# Patient Record
Sex: MALE | Race: WHITE | Employment: OTHER | URBAN - METROPOLITAN AREA
[De-identification: names, ages, dates, MRNs, and addresses within clinical notes are randomized per-mention and may not be internally consistent; named-entity substitution may affect disease eponyms.]

---

## 2017-02-23 ENCOUNTER — OFFICE VISIT (OUTPATIENT)
Dept: INTERNAL MEDICINE CLINIC | Age: 67
End: 2017-02-23

## 2017-02-23 VITALS
RESPIRATION RATE: 18 BRPM | BODY MASS INDEX: 40.32 KG/M2 | HEART RATE: 72 BPM | HEIGHT: 68 IN | SYSTOLIC BLOOD PRESSURE: 134 MMHG | DIASTOLIC BLOOD PRESSURE: 86 MMHG | WEIGHT: 266 LBS

## 2017-02-23 DIAGNOSIS — I10 ESSENTIAL HYPERTENSION: ICD-10-CM

## 2017-02-23 DIAGNOSIS — E78.5 HYPERLIPIDEMIA WITH TARGET LDL LESS THAN 100: ICD-10-CM

## 2017-02-23 DIAGNOSIS — E11.9 CONTROLLED TYPE 2 DIABETES MELLITUS WITHOUT COMPLICATION, WITHOUT LONG-TERM CURRENT USE OF INSULIN (HCC): Primary | ICD-10-CM

## 2017-02-23 LAB
A/G RATIO: 1.7 (ref 1.1–2.2)
ALBUMIN SERPL-MCNC: 4.4 G/DL (ref 3.4–5)
ALP BLD-CCNC: 50 U/L (ref 40–129)
ALT SERPL-CCNC: 35 U/L (ref 10–40)
ANION GAP SERPL CALCULATED.3IONS-SCNC: 15 MMOL/L (ref 3–16)
AST SERPL-CCNC: 27 U/L (ref 15–37)
BASOPHILS ABSOLUTE: 0.1 K/UL (ref 0–0.2)
BASOPHILS RELATIVE PERCENT: 0.8 %
BILIRUB SERPL-MCNC: 0.4 MG/DL (ref 0–1)
BUN BLDV-MCNC: 16 MG/DL (ref 7–20)
CALCIUM SERPL-MCNC: 9.4 MG/DL (ref 8.3–10.6)
CHLORIDE BLD-SCNC: 99 MMOL/L (ref 99–110)
CHOLESTEROL, TOTAL: 158 MG/DL (ref 0–199)
CO2: 26 MMOL/L (ref 21–32)
CREAT SERPL-MCNC: 1 MG/DL (ref 0.8–1.3)
CREATININE URINE: 95.8 MG/DL (ref 39–259)
EOSINOPHILS ABSOLUTE: 0.2 K/UL (ref 0–0.6)
EOSINOPHILS RELATIVE PERCENT: 3.2 %
GFR AFRICAN AMERICAN: >60
GFR NON-AFRICAN AMERICAN: >60
GLOBULIN: 2.6 G/DL
GLUCOSE BLD-MCNC: 145 MG/DL (ref 70–99)
HCT VFR BLD CALC: 40.7 % (ref 40.5–52.5)
HDLC SERPL-MCNC: 48 MG/DL (ref 40–60)
HEMOGLOBIN: 13 G/DL (ref 13.5–17.5)
LDL CHOLESTEROL CALCULATED: 73 MG/DL
LYMPHOCYTES ABSOLUTE: 2.4 K/UL (ref 1–5.1)
LYMPHOCYTES RELATIVE PERCENT: 33.3 %
MCH RBC QN AUTO: 30.6 PG (ref 26–34)
MCHC RBC AUTO-ENTMCNC: 31.9 G/DL (ref 31–36)
MCV RBC AUTO: 95.9 FL (ref 80–100)
MICROALBUMIN UR-MCNC: <1.2 MG/DL
MICROALBUMIN/CREAT UR-RTO: NORMAL MG/G (ref 0–30)
MONOCYTES ABSOLUTE: 0.7 K/UL (ref 0–1.3)
MONOCYTES RELATIVE PERCENT: 9.2 %
NEUTROPHILS ABSOLUTE: 3.8 K/UL (ref 1.7–7.7)
NEUTROPHILS RELATIVE PERCENT: 53.5 %
PDW BLD-RTO: 13.4 % (ref 12.4–15.4)
PLATELET # BLD: 150 K/UL (ref 135–450)
PMV BLD AUTO: 11 FL (ref 5–10.5)
POTASSIUM SERPL-SCNC: 5.3 MMOL/L (ref 3.5–5.1)
RBC # BLD: 4.24 M/UL (ref 4.2–5.9)
SODIUM BLD-SCNC: 140 MMOL/L (ref 136–145)
TOTAL PROTEIN: 7 G/DL (ref 6.4–8.2)
TRIGL SERPL-MCNC: 186 MG/DL (ref 0–150)
VLDLC SERPL CALC-MCNC: 37 MG/DL
WBC # BLD: 7.2 K/UL (ref 4–11)

## 2017-02-23 PROCEDURE — G8484 FLU IMMUNIZE NO ADMIN: HCPCS | Performed by: INTERNAL MEDICINE

## 2017-02-23 PROCEDURE — 3045F PR MOST RECENT HEMOGLOBIN A1C LEVEL 7.0-9.0%: CPT | Performed by: INTERNAL MEDICINE

## 2017-02-23 PROCEDURE — 4040F PNEUMOC VAC/ADMIN/RCVD: CPT | Performed by: INTERNAL MEDICINE

## 2017-02-23 PROCEDURE — 1036F TOBACCO NON-USER: CPT | Performed by: INTERNAL MEDICINE

## 2017-02-23 PROCEDURE — 99214 OFFICE O/P EST MOD 30 MIN: CPT | Performed by: INTERNAL MEDICINE

## 2017-02-23 PROCEDURE — G8427 DOCREV CUR MEDS BY ELIG CLIN: HCPCS | Performed by: INTERNAL MEDICINE

## 2017-02-23 PROCEDURE — G8417 CALC BMI ABV UP PARAM F/U: HCPCS | Performed by: INTERNAL MEDICINE

## 2017-02-23 PROCEDURE — 36415 COLL VENOUS BLD VENIPUNCTURE: CPT | Performed by: INTERNAL MEDICINE

## 2017-02-23 PROCEDURE — 1123F ACP DISCUSS/DSCN MKR DOCD: CPT | Performed by: INTERNAL MEDICINE

## 2017-02-23 PROCEDURE — 3017F COLORECTAL CA SCREEN DOC REV: CPT | Performed by: INTERNAL MEDICINE

## 2017-02-23 RX ORDER — PIOGLITAZONEHYDROCHLORIDE 45 MG/1
45 TABLET ORAL DAILY
Qty: 30 TABLET | Refills: 0 | Status: SHIPPED | OUTPATIENT
Start: 2017-02-23 | End: 2017-03-17 | Stop reason: ALTCHOICE

## 2017-02-23 RX ORDER — SIMVASTATIN 40 MG
40 TABLET ORAL NIGHTLY
Qty: 90 TABLET | Refills: 0 | Status: SHIPPED | OUTPATIENT
Start: 2017-02-23 | End: 2017-10-04 | Stop reason: SDUPTHER

## 2017-02-24 LAB
ESTIMATED AVERAGE GLUCOSE: 145.6 MG/DL
HBA1C MFR BLD: 6.7 %

## 2017-03-17 ENCOUNTER — HOSPITAL ENCOUNTER (OUTPATIENT)
Dept: OTHER | Age: 67
Discharge: HOME | End: 2017-03-17
Attending: INTERNAL MEDICINE | Admitting: INTERNAL MEDICINE

## 2017-03-17 VITALS
HEART RATE: 75 BPM | HEIGHT: 68 IN | OXYGEN SATURATION: 93 % | WEIGHT: 266 LBS | DIASTOLIC BLOOD PRESSURE: 62 MMHG | RESPIRATION RATE: 16 BRPM | SYSTOLIC BLOOD PRESSURE: 124 MMHG | BODY MASS INDEX: 40.32 KG/M2 | TEMPERATURE: 97 F

## 2017-03-17 LAB
GLUCOSE BLD-MCNC: 140 MG/DL (ref 70–99)
GLUCOSE BLD-MCNC: 144 MG/DL (ref 70–99)
PERFORMED ON: ABNORMAL
PERFORMED ON: ABNORMAL

## 2017-03-17 RX ORDER — SODIUM CHLORIDE, SODIUM LACTATE, POTASSIUM CHLORIDE, CALCIUM CHLORIDE 600; 310; 30; 20 MG/100ML; MG/100ML; MG/100ML; MG/100ML
INJECTION, SOLUTION INTRAVENOUS CONTINUOUS
Status: DISCONTINUED | OUTPATIENT
Start: 2017-03-17 | End: 2017-03-18 | Stop reason: HOSPADM

## 2017-03-17 ASSESSMENT — PAIN SCALES - GENERAL
PAINLEVEL_OUTOF10: 0

## 2017-03-17 ASSESSMENT — PAIN - FUNCTIONAL ASSESSMENT: PAIN_FUNCTIONAL_ASSESSMENT: 0-10

## 2017-04-27 ENCOUNTER — OFFICE VISIT (OUTPATIENT)
Dept: INTERNAL MEDICINE CLINIC | Age: 67
End: 2017-04-27

## 2017-04-27 VITALS
SYSTOLIC BLOOD PRESSURE: 120 MMHG | RESPIRATION RATE: 18 BRPM | WEIGHT: 263 LBS | HEART RATE: 82 BPM | DIASTOLIC BLOOD PRESSURE: 84 MMHG | BODY MASS INDEX: 39.86 KG/M2 | HEIGHT: 68 IN

## 2017-04-27 DIAGNOSIS — I83.10 LIPODERMATOSCLEROSIS: Primary | ICD-10-CM

## 2017-04-27 DIAGNOSIS — E11.9 CONTROLLED TYPE 2 DIABETES MELLITUS WITHOUT COMPLICATION, WITHOUT LONG-TERM CURRENT USE OF INSULIN (HCC): ICD-10-CM

## 2017-04-27 PROBLEM — M79.3 LIPODERMATOSCLEROSIS: Status: ACTIVE | Noted: 2017-04-27

## 2017-04-27 PROCEDURE — 1123F ACP DISCUSS/DSCN MKR DOCD: CPT | Performed by: INTERNAL MEDICINE

## 2017-04-27 PROCEDURE — 3017F COLORECTAL CA SCREEN DOC REV: CPT | Performed by: INTERNAL MEDICINE

## 2017-04-27 PROCEDURE — 4040F PNEUMOC VAC/ADMIN/RCVD: CPT | Performed by: INTERNAL MEDICINE

## 2017-04-27 PROCEDURE — 99213 OFFICE O/P EST LOW 20 MIN: CPT | Performed by: INTERNAL MEDICINE

## 2017-04-27 PROCEDURE — G8417 CALC BMI ABV UP PARAM F/U: HCPCS | Performed by: INTERNAL MEDICINE

## 2017-04-27 PROCEDURE — 1036F TOBACCO NON-USER: CPT | Performed by: INTERNAL MEDICINE

## 2017-04-27 PROCEDURE — 3044F HG A1C LEVEL LT 7.0%: CPT | Performed by: INTERNAL MEDICINE

## 2017-04-27 PROCEDURE — G8427 DOCREV CUR MEDS BY ELIG CLIN: HCPCS | Performed by: INTERNAL MEDICINE

## 2017-04-27 RX ORDER — FLUOCINONIDE 0.5 MG/G
OINTMENT TOPICAL DAILY
COMMUNITY

## 2017-08-11 ENCOUNTER — OFFICE VISIT (OUTPATIENT)
Dept: INTERNAL MEDICINE CLINIC | Age: 67
End: 2017-08-11

## 2017-08-11 VITALS
HEIGHT: 68 IN | DIASTOLIC BLOOD PRESSURE: 80 MMHG | HEART RATE: 82 BPM | RESPIRATION RATE: 18 BRPM | WEIGHT: 254 LBS | BODY MASS INDEX: 38.49 KG/M2 | SYSTOLIC BLOOD PRESSURE: 130 MMHG

## 2017-08-11 DIAGNOSIS — E11.9 CONTROLLED TYPE 2 DIABETES MELLITUS WITHOUT COMPLICATION, WITHOUT LONG-TERM CURRENT USE OF INSULIN (HCC): Primary | ICD-10-CM

## 2017-08-11 PROCEDURE — 3017F COLORECTAL CA SCREEN DOC REV: CPT | Performed by: INTERNAL MEDICINE

## 2017-08-11 PROCEDURE — 3046F HEMOGLOBIN A1C LEVEL >9.0%: CPT | Performed by: INTERNAL MEDICINE

## 2017-08-11 PROCEDURE — 1123F ACP DISCUSS/DSCN MKR DOCD: CPT | Performed by: INTERNAL MEDICINE

## 2017-08-11 PROCEDURE — G8417 CALC BMI ABV UP PARAM F/U: HCPCS | Performed by: INTERNAL MEDICINE

## 2017-08-11 PROCEDURE — 99213 OFFICE O/P EST LOW 20 MIN: CPT | Performed by: INTERNAL MEDICINE

## 2017-08-11 PROCEDURE — 4040F PNEUMOC VAC/ADMIN/RCVD: CPT | Performed by: INTERNAL MEDICINE

## 2017-08-11 PROCEDURE — G8427 DOCREV CUR MEDS BY ELIG CLIN: HCPCS | Performed by: INTERNAL MEDICINE

## 2017-08-11 PROCEDURE — 1036F TOBACCO NON-USER: CPT | Performed by: INTERNAL MEDICINE

## 2017-09-17 DIAGNOSIS — I10 ESSENTIAL HYPERTENSION: ICD-10-CM

## 2017-09-18 RX ORDER — LISINOPRIL 20 MG/1
TABLET ORAL
Qty: 90 TABLET | Refills: 0 | Status: SHIPPED | OUTPATIENT
Start: 2017-09-18 | End: 2017-10-04 | Stop reason: SDUPTHER

## 2017-09-18 RX ORDER — ATENOLOL 50 MG/1
TABLET ORAL
Qty: 90 TABLET | Refills: 0 | Status: SHIPPED | OUTPATIENT
Start: 2017-09-18 | End: 2017-10-04 | Stop reason: SDUPTHER

## 2017-10-04 ENCOUNTER — OFFICE VISIT (OUTPATIENT)
Dept: INTERNAL MEDICINE CLINIC | Age: 67
End: 2017-10-04

## 2017-10-04 VITALS
BODY MASS INDEX: 37.74 KG/M2 | WEIGHT: 249 LBS | DIASTOLIC BLOOD PRESSURE: 86 MMHG | HEART RATE: 80 BPM | HEIGHT: 68 IN | RESPIRATION RATE: 18 BRPM | SYSTOLIC BLOOD PRESSURE: 130 MMHG

## 2017-10-04 DIAGNOSIS — E11.9 CONTROLLED TYPE 2 DIABETES MELLITUS WITHOUT COMPLICATION, WITHOUT LONG-TERM CURRENT USE OF INSULIN (HCC): ICD-10-CM

## 2017-10-04 DIAGNOSIS — Z00.00 MEDICARE ANNUAL WELLNESS VISIT, SUBSEQUENT: Primary | ICD-10-CM

## 2017-10-04 DIAGNOSIS — Z23 NEED FOR STREPTOCOCCUS PNEUMONIAE VACCINATION: ICD-10-CM

## 2017-10-04 DIAGNOSIS — E29.1 HYPOGONADISM MALE: ICD-10-CM

## 2017-10-04 DIAGNOSIS — I10 ESSENTIAL HYPERTENSION: ICD-10-CM

## 2017-10-04 DIAGNOSIS — Z23 NEED FOR INFLUENZA VACCINATION: ICD-10-CM

## 2017-10-04 DIAGNOSIS — E78.5 HYPERLIPIDEMIA WITH TARGET LDL LESS THAN 100: ICD-10-CM

## 2017-10-04 LAB — HBA1C MFR BLD: 6.7 %

## 2017-10-04 PROCEDURE — G8427 DOCREV CUR MEDS BY ELIG CLIN: HCPCS | Performed by: INTERNAL MEDICINE

## 2017-10-04 PROCEDURE — 90662 IIV NO PRSV INCREASED AG IM: CPT | Performed by: INTERNAL MEDICINE

## 2017-10-04 PROCEDURE — 3017F COLORECTAL CA SCREEN DOC REV: CPT | Performed by: INTERNAL MEDICINE

## 2017-10-04 PROCEDURE — 4040F PNEUMOC VAC/ADMIN/RCVD: CPT | Performed by: INTERNAL MEDICINE

## 2017-10-04 PROCEDURE — 83036 HEMOGLOBIN GLYCOSYLATED A1C: CPT | Performed by: INTERNAL MEDICINE

## 2017-10-04 PROCEDURE — G8417 CALC BMI ABV UP PARAM F/U: HCPCS | Performed by: INTERNAL MEDICINE

## 2017-10-04 PROCEDURE — 1123F ACP DISCUSS/DSCN MKR DOCD: CPT | Performed by: INTERNAL MEDICINE

## 2017-10-04 PROCEDURE — G0008 ADMIN INFLUENZA VIRUS VAC: HCPCS | Performed by: INTERNAL MEDICINE

## 2017-10-04 PROCEDURE — G0439 PPPS, SUBSEQ VISIT: HCPCS | Performed by: INTERNAL MEDICINE

## 2017-10-04 PROCEDURE — G8484 FLU IMMUNIZE NO ADMIN: HCPCS | Performed by: INTERNAL MEDICINE

## 2017-10-04 PROCEDURE — G0009 ADMIN PNEUMOCOCCAL VACCINE: HCPCS | Performed by: INTERNAL MEDICINE

## 2017-10-04 PROCEDURE — 99214 OFFICE O/P EST MOD 30 MIN: CPT | Performed by: INTERNAL MEDICINE

## 2017-10-04 PROCEDURE — 90732 PPSV23 VACC 2 YRS+ SUBQ/IM: CPT | Performed by: INTERNAL MEDICINE

## 2017-10-04 PROCEDURE — 3046F HEMOGLOBIN A1C LEVEL >9.0%: CPT | Performed by: INTERNAL MEDICINE

## 2017-10-04 PROCEDURE — 1036F TOBACCO NON-USER: CPT | Performed by: INTERNAL MEDICINE

## 2017-10-04 RX ORDER — ATENOLOL 50 MG/1
TABLET ORAL
Qty: 90 TABLET | Refills: 3 | Status: SHIPPED | OUTPATIENT
Start: 2017-10-04 | End: 2018-09-17 | Stop reason: SDUPTHER

## 2017-10-04 RX ORDER — LISINOPRIL 20 MG/1
TABLET ORAL
Qty: 90 TABLET | Refills: 3 | Status: SHIPPED | OUTPATIENT
Start: 2017-10-04 | End: 2018-09-17 | Stop reason: SDUPTHER

## 2017-10-04 RX ORDER — SIMVASTATIN 40 MG
40 TABLET ORAL NIGHTLY
Qty: 90 TABLET | Refills: 1 | Status: SHIPPED | OUTPATIENT
Start: 2017-10-04 | End: 2018-09-17

## 2017-10-04 ASSESSMENT — PATIENT HEALTH QUESTIONNAIRE - PHQ9: SUM OF ALL RESPONSES TO PHQ QUESTIONS 1-9: 0

## 2017-10-04 ASSESSMENT — LIFESTYLE VARIABLES: HOW OFTEN DO YOU HAVE A DRINK CONTAINING ALCOHOL: 0

## 2017-10-04 ASSESSMENT — ANXIETY QUESTIONNAIRES: GAD7 TOTAL SCORE: 0

## 2017-10-04 NOTE — PROGRESS NOTES
lungs 2 times daily Yes Allison Espinal MD   ONE TOUCH ULTRA TEST strip TEST EVERY DAY Yes Rufino Flynn MD   aspirin 81 MG EC tablet Take 81 mg by mouth daily.  Yes Historical Provider, MD     Past Medical History:   Diagnosis Date    Allergic rhinitis     BPH     Colitis     ED (erectile dysfunction) 8/5/2013    Gout involving toe of right foot     due to eating too much chicken that had a lot of uric acid in [place]    Hyperlipidemia     Hypertension     Type 2 diabetes mellitus without complication ([address]     Type II or unspecified type diabetes mellitus without mention of complication, not stated as uncontrolled      Past Surgical History:   Procedure Laterality Date    BACK SURGERY      COLONOSCOPY  2007    normal per pt    SHOULDER SURGERY      TONGUE SURGERY      ectactic vessel and thrombus     Family History   Problem Relation Age of Onset    Cancer Mother      colon    Cancer Father      lung    Cancer Sister      pancreatic    Diabetes Sister     Cancer Sister      Kidney    Other Brother      diverticulitis with partial colectomy       CareTeam (Including outside providers/suppliers regularly involved in providing care):   Patient Care Team:  Mariana Espinal MD as PCP - General (Internal Medicine)  Traci Sosa MD as PCP - [facility] Attributed Provider    Wt Readings from Last 3 Encounters:   10/04/17 249 lb (112.9 kg)   08/11/17 254 lb (115.2 kg)   04/27/17 263 lb (119.3 kg)     Vitals:    10/04/17 1019   BP: 130/86   Pulse: 80   Resp: 18   Weight: 249 lb (112.9 kg)   Height: 5' 8\" (1.727 m)       General Appearance: alert and oriented to person, place and time, well developed and well- nourished, in no acute distress  Skin: warm and dry, no rash or erythema  Head: normocephalic and atraumatic  Eyes: pupils equal, round, and reactive to light, extraocular eye movements intact, conjunctivae normal  ENT: tympanic membrane, external ear and ear canal normal bilaterally, nose without deformity,

## 2017-10-04 NOTE — PATIENT INSTRUCTIONS
Personalized Preventive Plan for Jack Parekh - 10/4/2017  Medicare offers a range of preventive health benefits. Some of the tests and screenings are paid in full while other may be subject to a deductible, co-insurance, and/or copay. Some of these benefits include a comprehensive review of your medical history including lifestyle, illnesses that may run in your family, and various assessments and screenings as appropriate. After reviewing your medical record and screening and assessments performed today your provider may have ordered immunizations, labs, imaging, and/or referrals for you. A list of these orders (if applicable) as well as your Preventive Care list are included within your After Visit Summary for your review. Other Preventive Recommendations:    · A preventive eye exam performed by an eye specialist is recommended every 1-2 years to screen for glaucoma; cataracts, macular degeneration, and other eye disorders. · A preventive dental visit is recommended every 6 months. · Try to get at least 150 minutes of exercise per week or 10,000 steps per day on a pedometer . · Order or download the FREE \"Exercise & Physical Activity: Your Everyday Guide\" from [employer]. Call 5-802.573.9478 or search [employer] online. · You need 4340-6568 mg of calcium and 7359-4906 IU of vitamin D per day. It is possible to meet your calcium requirement with diet alone, but a vitamin D supplement is usually necessary to meet this goal.  · When exposed to the sun, use a sunscreen that protects against both UVA and UVB radiation with an SPF of 30 or greater. Reapply every 2 to 3 hours or after sweating, drying off with a towel, or swimming. · Always wear a seat belt when traveling in a car. Always wear a helmet when riding a bicycle or motorcycle.

## 2017-10-04 NOTE — MR AVS SNAPSHOT
· Always wear a seat belt when traveling in a car. Always wear a helmet when riding a bicycle or motorcycle. Today's Medication Changes          These changes are accurate as of: 10/4/17 10:55 AM.  If you have any questions, ask your nurse or doctor. CHANGE how you take these medications           atenolol 50 MG tablet   Commonly known as:  TENORMIN   Instructions:  TAKE 1 TABLET BY MOUTH DAILY   Quantity:  90 tablet   Refills:  3   What changed:  See the new instructions. Changed by:  Bimal Valladares MD       glucose blood VI test strips strip   Commonly known as:  ONE TOUCH ULTRA TEST   Instructions:  TEST EVERY DAY   Quantity:  200 strip   Refills:  3   What changed:  See the new instructions. Changed by:  Bimal Valladares MD       lisinopril 20 MG tablet   Commonly known as:  PRINIVIL;ZESTRIL   Instructions:  TAKE 1 TABLET BY MOUTH DAILY   Quantity:  90 tablet   Refills:  3   What changed:  See the new instructions. Changed by:  Bimal Valladares MD            Where to Get Your Medications      These medications were sent to [facility] [address], [address] [facility] [address]     Phone:  [phone]     atenolol 50 MG tablet    glucose blood VI test strips strip    lisinopril 20 MG tablet    metFORMIN 1000 MG tablet    simvastatin 40 MG tablet               Your Current Medications Are              lisinopril (PRINIVIL;ZESTRIL) 20 MG tablet TAKE 1 TABLET BY MOUTH DAILY    atenolol (TENORMIN) 50 MG tablet TAKE 1 TABLET BY MOUTH DAILY    metFORMIN (GLUCOPHAGE) 1000 MG tablet TAKE 1 TABLET BY MOUTH in AM and 1 1/2 PM    simvastatin (ZOCOR) 40 MG tablet Take 1 tablet by mouth nightly    glucose blood VI test strips (ONE TOUCH ULTRA TEST) strip TEST EVERY DAY    fluocinonide (LIDEX) 0.05 % ointment Apply topically daily Indications: 30 gm Apply topically 2 times daily. Compression Stockings MISC by Does not apply route    budesonide-formoterol (SYMBICORT) 160-4.5 MCG/ACT AERO Inhale 2 puffs into the lungs 2 times daily    aspirin 81 MG EC tablet Take 81 mg by mouth daily.       Allergies              Bactrim [Sulfamethoxazole-trimethoprim]     SOB and diaphorectic      We Ordered/Performed the following           Diabetic Foot Exam     INFLUENZA, HIGH DOSE, 65 YRS +, IM, PF, PREFILL SYR, 0.5ML (FLUZONE HD)     Pneumococcal polysaccharide vaccine 23-valent greater than or equal to 3yo subcutaneous/IM     Comments:    V03.82    POCT glycosylated hemoglobin (Hb A1C)          Additional Information        Basic Information     Date Of Birth Sex Race Ethnicity Preferred Language    1950 Male White Non-/Non  English      Problem List as of 10/4/2017  Date Reviewed: 10/4/2017                Lipodermatosclerosis    Hypogonadism male    ED (erectile dysfunction)    Essential hypertension    Hyperlipidemia with target LDL less than 100    Controlled type 2 diabetes mellitus without complication, without long-term current use of insulin ([facility] [address]      Immunizations as of 10/4/2017     Name Date    Influenza Virus Vaccine 10/6/2014, 9/19/2013, 9/22/2011, 11/2/2010    Influenza, High Dose 10/25/2016, 11/2/2015, 11/7/2012    Pneumococcal 13-valent Conjugate (Ucaeooh23) 8/17/2015    Pneumococcal Polysaccharide ([id number]) 8/23/2011    Tdap (Boostrix, Adacel) 2/7/2014    Tetanus 2/7/2014, 8/1/2005    Zoster 1/31/2013      Preventive Care        Date Due    Pneumococcal Vaccines (two) for all adults aged 72 and over (2 of 2 - PPSV23) 8/23/2016    Diabetic Foot Exam 2/22/2017    Yearly Flu Vaccine (1) 9/1/2017    Eye Exam By An Eye Doctor 2/17/2018    Hemoglobin A1C (Test For Long-Term Glucose Control) 2/23/2018    Urine Check For Kidney Problems 2/23/2018    Cholesterol Screening 2/23/2018    Colonoscopy 3/17/2022    Tetanus Combination Vaccine (2 - Td) 2/7/2024 [employer] Signup           Our records indicate that you have an active [employer] account. You can view your After Visit Summary by going to [email or web address] and logging in with your [employer] username and password. If you don't have a [employer] username and password but a parent or guardian has access to your record, the parent or guardian should login with their own [employer] username and password and access your record to view the After Visit Summary. Additional Information  If you have questions, please contact the physician practice where you receive care. Remember, [employer] is NOT to be used for urgent needs. For medical emergencies, dial 911. For questions regarding your [employer] account call [phone]. If you have a clinical question, please call your doctor's office.

## 2018-02-26 ENCOUNTER — OFFICE VISIT (OUTPATIENT)
Dept: INTERNAL MEDICINE CLINIC | Age: 68
End: 2018-02-26

## 2018-02-26 VITALS
HEART RATE: 74 BPM | HEIGHT: 68 IN | RESPIRATION RATE: 16 BRPM | WEIGHT: 235 LBS | SYSTOLIC BLOOD PRESSURE: 130 MMHG | DIASTOLIC BLOOD PRESSURE: 78 MMHG | BODY MASS INDEX: 35.61 KG/M2

## 2018-02-26 DIAGNOSIS — E78.5 HYPERLIPIDEMIA WITH TARGET LDL LESS THAN 100: ICD-10-CM

## 2018-02-26 DIAGNOSIS — E11.9 CONTROLLED TYPE 2 DIABETES MELLITUS WITHOUT COMPLICATION, WITHOUT LONG-TERM CURRENT USE OF INSULIN (HCC): Primary | ICD-10-CM

## 2018-02-26 DIAGNOSIS — I10 ESSENTIAL HYPERTENSION: ICD-10-CM

## 2018-02-26 LAB
BASOPHILS ABSOLUTE: 0 K/UL (ref 0–0.2)
BASOPHILS RELATIVE PERCENT: 0.6 %
EOSINOPHILS ABSOLUTE: 0.2 K/UL (ref 0–0.6)
EOSINOPHILS RELATIVE PERCENT: 3 %
HCT VFR BLD CALC: 40.4 % (ref 40.5–52.5)
HEMOGLOBIN: 13.5 G/DL (ref 13.5–17.5)
LYMPHOCYTES ABSOLUTE: 1.7 K/UL (ref 1–5.1)
LYMPHOCYTES RELATIVE PERCENT: 26.3 %
MCH RBC QN AUTO: 32.3 PG (ref 26–34)
MCHC RBC AUTO-ENTMCNC: 33.4 G/DL (ref 31–36)
MCV RBC AUTO: 96.6 FL (ref 80–100)
MONOCYTES ABSOLUTE: 0.4 K/UL (ref 0–1.3)
MONOCYTES RELATIVE PERCENT: 6.2 %
NEUTROPHILS ABSOLUTE: 4.1 K/UL (ref 1.7–7.7)
NEUTROPHILS RELATIVE PERCENT: 63.9 %
PDW BLD-RTO: 13 % (ref 12.4–15.4)
PLATELET # BLD: 154 K/UL (ref 135–450)
PMV BLD AUTO: 11.2 FL (ref 5–10.5)
RBC # BLD: 4.18 M/UL (ref 4.2–5.9)
WBC # BLD: 6.4 K/UL (ref 4–11)

## 2018-02-26 PROCEDURE — G8484 FLU IMMUNIZE NO ADMIN: HCPCS | Performed by: INTERNAL MEDICINE

## 2018-02-26 PROCEDURE — 99214 OFFICE O/P EST MOD 30 MIN: CPT | Performed by: INTERNAL MEDICINE

## 2018-02-26 PROCEDURE — 3046F HEMOGLOBIN A1C LEVEL >9.0%: CPT | Performed by: INTERNAL MEDICINE

## 2018-02-26 PROCEDURE — G8427 DOCREV CUR MEDS BY ELIG CLIN: HCPCS | Performed by: INTERNAL MEDICINE

## 2018-02-26 PROCEDURE — 1036F TOBACCO NON-USER: CPT | Performed by: INTERNAL MEDICINE

## 2018-02-26 PROCEDURE — 4040F PNEUMOC VAC/ADMIN/RCVD: CPT | Performed by: INTERNAL MEDICINE

## 2018-02-26 PROCEDURE — 36415 COLL VENOUS BLD VENIPUNCTURE: CPT | Performed by: INTERNAL MEDICINE

## 2018-02-26 PROCEDURE — 3017F COLORECTAL CA SCREEN DOC REV: CPT | Performed by: INTERNAL MEDICINE

## 2018-02-26 PROCEDURE — G8417 CALC BMI ABV UP PARAM F/U: HCPCS | Performed by: INTERNAL MEDICINE

## 2018-02-26 PROCEDURE — 1123F ACP DISCUSS/DSCN MKR DOCD: CPT | Performed by: INTERNAL MEDICINE

## 2018-02-27 LAB
A/G RATIO: 2 (ref 1.1–2.2)
ALBUMIN SERPL-MCNC: 4.5 G/DL (ref 3.4–5)
ALP BLD-CCNC: 50 U/L (ref 40–129)
ALT SERPL-CCNC: 18 U/L (ref 10–40)
ANION GAP SERPL CALCULATED.3IONS-SCNC: 15 MMOL/L (ref 3–16)
AST SERPL-CCNC: 22 U/L (ref 15–37)
BILIRUB SERPL-MCNC: 0.7 MG/DL (ref 0–1)
BUN BLDV-MCNC: 16 MG/DL (ref 7–20)
CALCIUM SERPL-MCNC: 8.8 MG/DL (ref 8.3–10.6)
CHLORIDE BLD-SCNC: 102 MMOL/L (ref 99–110)
CHOLESTEROL, TOTAL: 114 MG/DL (ref 0–199)
CO2: 25 MMOL/L (ref 21–32)
CREAT SERPL-MCNC: 1 MG/DL (ref 0.8–1.3)
CREATININE URINE: 144 MG/DL (ref 39–259)
ESTIMATED AVERAGE GLUCOSE: 131.2 MG/DL
GFR AFRICAN AMERICAN: >60
GFR NON-AFRICAN AMERICAN: >60
GLOBULIN: 2.2 G/DL
GLUCOSE BLD-MCNC: 118 MG/DL (ref 70–99)
HBA1C MFR BLD: 6.2 %
HDLC SERPL-MCNC: 46 MG/DL (ref 40–60)
LDL CHOLESTEROL CALCULATED: 38 MG/DL
MICROALBUMIN UR-MCNC: <1.2 MG/DL
MICROALBUMIN/CREAT UR-RTO: NORMAL MG/G (ref 0–30)
POTASSIUM SERPL-SCNC: 4.9 MMOL/L (ref 3.5–5.1)
SODIUM BLD-SCNC: 142 MMOL/L (ref 136–145)
TOTAL PROTEIN: 6.7 G/DL (ref 6.4–8.2)
TRIGL SERPL-MCNC: 151 MG/DL (ref 0–150)
VLDLC SERPL CALC-MCNC: 30 MG/DL

## 2018-05-29 ENCOUNTER — OFFICE VISIT (OUTPATIENT)
Dept: INTERNAL MEDICINE CLINIC | Age: 68
End: 2018-05-29

## 2018-05-29 VITALS
DIASTOLIC BLOOD PRESSURE: 82 MMHG | BODY MASS INDEX: 35.77 KG/M2 | HEART RATE: 82 BPM | SYSTOLIC BLOOD PRESSURE: 124 MMHG | RESPIRATION RATE: 16 BRPM | WEIGHT: 236 LBS | HEIGHT: 68 IN

## 2018-05-29 DIAGNOSIS — H35.342 MACULAR HOLE OF LEFT EYE: ICD-10-CM

## 2018-05-29 DIAGNOSIS — Z01.818 PREOP EXAM FOR INTERNAL MEDICINE: Primary | ICD-10-CM

## 2018-05-29 DIAGNOSIS — H25.012 CORTICAL AGE-RELATED CATARACT OF LEFT EYE: ICD-10-CM

## 2018-05-29 PROCEDURE — 3017F COLORECTAL CA SCREEN DOC REV: CPT | Performed by: INTERNAL MEDICINE

## 2018-05-29 PROCEDURE — 99214 OFFICE O/P EST MOD 30 MIN: CPT | Performed by: INTERNAL MEDICINE

## 2018-05-29 PROCEDURE — 1036F TOBACCO NON-USER: CPT | Performed by: INTERNAL MEDICINE

## 2018-05-29 PROCEDURE — 1123F ACP DISCUSS/DSCN MKR DOCD: CPT | Performed by: INTERNAL MEDICINE

## 2018-05-29 PROCEDURE — 4040F PNEUMOC VAC/ADMIN/RCVD: CPT | Performed by: INTERNAL MEDICINE

## 2018-05-29 PROCEDURE — G8427 DOCREV CUR MEDS BY ELIG CLIN: HCPCS | Performed by: INTERNAL MEDICINE

## 2018-05-29 PROCEDURE — G8417 CALC BMI ABV UP PARAM F/U: HCPCS | Performed by: INTERNAL MEDICINE

## 2018-05-29 RX ORDER — CIPROFLOXACIN AND DEXAMETHASONE 3; 1 MG/ML; MG/ML
4 SUSPENSION/ DROPS AURICULAR (OTIC) 2 TIMES DAILY
COMMUNITY
End: 2020-09-23

## 2018-07-12 LAB — DIABETIC RETINOPATHY: NORMAL

## 2018-09-17 ENCOUNTER — OFFICE VISIT (OUTPATIENT)
Dept: INTERNAL MEDICINE CLINIC | Age: 68
End: 2018-09-17

## 2018-09-17 VITALS
HEIGHT: 68 IN | OXYGEN SATURATION: 98 % | SYSTOLIC BLOOD PRESSURE: 132 MMHG | BODY MASS INDEX: 37.5 KG/M2 | WEIGHT: 247.4 LBS | HEART RATE: 75 BPM | DIASTOLIC BLOOD PRESSURE: 64 MMHG

## 2018-09-17 DIAGNOSIS — Z23 NEED FOR INFLUENZA VACCINATION: ICD-10-CM

## 2018-09-17 DIAGNOSIS — E11.9 CONTROLLED TYPE 2 DIABETES MELLITUS WITHOUT COMPLICATION, WITHOUT LONG-TERM CURRENT USE OF INSULIN (HCC): Primary | ICD-10-CM

## 2018-09-17 DIAGNOSIS — E78.5 HYPERLIPIDEMIA WITH TARGET LDL LESS THAN 100: ICD-10-CM

## 2018-09-17 DIAGNOSIS — I10 ESSENTIAL HYPERTENSION: ICD-10-CM

## 2018-09-17 LAB — HBA1C MFR BLD: 6.6 %

## 2018-09-17 PROCEDURE — 1101F PT FALLS ASSESS-DOCD LE1/YR: CPT | Performed by: INTERNAL MEDICINE

## 2018-09-17 PROCEDURE — G0008 ADMIN INFLUENZA VIRUS VAC: HCPCS | Performed by: INTERNAL MEDICINE

## 2018-09-17 PROCEDURE — G8417 CALC BMI ABV UP PARAM F/U: HCPCS | Performed by: INTERNAL MEDICINE

## 2018-09-17 PROCEDURE — 1123F ACP DISCUSS/DSCN MKR DOCD: CPT | Performed by: INTERNAL MEDICINE

## 2018-09-17 PROCEDURE — 3017F COLORECTAL CA SCREEN DOC REV: CPT | Performed by: INTERNAL MEDICINE

## 2018-09-17 PROCEDURE — 3044F HG A1C LEVEL LT 7.0%: CPT | Performed by: INTERNAL MEDICINE

## 2018-09-17 PROCEDURE — 83036 HEMOGLOBIN GLYCOSYLATED A1C: CPT | Performed by: INTERNAL MEDICINE

## 2018-09-17 PROCEDURE — 99214 OFFICE O/P EST MOD 30 MIN: CPT | Performed by: INTERNAL MEDICINE

## 2018-09-17 PROCEDURE — 90662 IIV NO PRSV INCREASED AG IM: CPT | Performed by: INTERNAL MEDICINE

## 2018-09-17 PROCEDURE — 2022F DILAT RTA XM EVC RTNOPTHY: CPT | Performed by: INTERNAL MEDICINE

## 2018-09-17 PROCEDURE — 4040F PNEUMOC VAC/ADMIN/RCVD: CPT | Performed by: INTERNAL MEDICINE

## 2018-09-17 PROCEDURE — 1036F TOBACCO NON-USER: CPT | Performed by: INTERNAL MEDICINE

## 2018-09-17 PROCEDURE — G8427 DOCREV CUR MEDS BY ELIG CLIN: HCPCS | Performed by: INTERNAL MEDICINE

## 2018-09-17 RX ORDER — LISINOPRIL 20 MG/1
TABLET ORAL
Qty: 90 TABLET | Refills: 1 | Status: SHIPPED | OUTPATIENT
Start: 2018-09-17 | End: 2019-02-26 | Stop reason: SDUPTHER

## 2018-09-17 RX ORDER — SIMVASTATIN 20 MG
20 TABLET ORAL EVERY EVENING
Qty: 90 TABLET | Refills: 3 | Status: SHIPPED | OUTPATIENT
Start: 2018-09-17 | End: 2019-08-06 | Stop reason: SDUPTHER

## 2018-09-17 RX ORDER — ATENOLOL 50 MG/1
TABLET ORAL
Qty: 90 TABLET | Refills: 1 | Status: SHIPPED | OUTPATIENT
Start: 2018-09-17 | End: 2019-02-26 | Stop reason: SDUPTHER

## 2018-09-17 RX ORDER — SIMVASTATIN 40 MG
40 TABLET ORAL NIGHTLY
Qty: 90 TABLET | Refills: 1 | Status: CANCELLED | OUTPATIENT
Start: 2018-09-17

## 2018-09-17 ASSESSMENT — PATIENT HEALTH QUESTIONNAIRE - PHQ9
2. FEELING DOWN, DEPRESSED OR HOPELESS: 0
SUM OF ALL RESPONSES TO PHQ QUESTIONS 1-9: 0
1. LITTLE INTEREST OR PLEASURE IN DOING THINGS: 0
SUM OF ALL RESPONSES TO PHQ QUESTIONS 1-9: 0
SUM OF ALL RESPONSES TO PHQ9 QUESTIONS 1 & 2: 0

## 2018-10-30 ENCOUNTER — OFFICE VISIT (OUTPATIENT)
Dept: INTERNAL MEDICINE CLINIC | Age: 68
End: 2018-10-30
Payer: MEDICARE

## 2018-10-30 VITALS
HEIGHT: 68 IN | TEMPERATURE: 98.1 F | DIASTOLIC BLOOD PRESSURE: 88 MMHG | OXYGEN SATURATION: 98 % | BODY MASS INDEX: 36.83 KG/M2 | WEIGHT: 243 LBS | SYSTOLIC BLOOD PRESSURE: 156 MMHG | HEART RATE: 64 BPM

## 2018-10-30 DIAGNOSIS — J06.9 URI, ACUTE: Primary | ICD-10-CM

## 2018-10-30 PROCEDURE — G8482 FLU IMMUNIZE ORDER/ADMIN: HCPCS | Performed by: INTERNAL MEDICINE

## 2018-10-30 PROCEDURE — 99213 OFFICE O/P EST LOW 20 MIN: CPT | Performed by: INTERNAL MEDICINE

## 2018-10-30 PROCEDURE — G8417 CALC BMI ABV UP PARAM F/U: HCPCS | Performed by: INTERNAL MEDICINE

## 2018-10-30 PROCEDURE — 1036F TOBACCO NON-USER: CPT | Performed by: INTERNAL MEDICINE

## 2018-10-30 PROCEDURE — 3017F COLORECTAL CA SCREEN DOC REV: CPT | Performed by: INTERNAL MEDICINE

## 2018-10-30 PROCEDURE — G8427 DOCREV CUR MEDS BY ELIG CLIN: HCPCS | Performed by: INTERNAL MEDICINE

## 2018-10-30 PROCEDURE — 4040F PNEUMOC VAC/ADMIN/RCVD: CPT | Performed by: INTERNAL MEDICINE

## 2018-10-30 PROCEDURE — 1101F PT FALLS ASSESS-DOCD LE1/YR: CPT | Performed by: INTERNAL MEDICINE

## 2018-10-30 PROCEDURE — 1123F ACP DISCUSS/DSCN MKR DOCD: CPT | Performed by: INTERNAL MEDICINE

## 2018-10-30 RX ORDER — AZITHROMYCIN 250 MG/1
TABLET, FILM COATED ORAL
Qty: 1 PACKET | Refills: 0 | Status: SHIPPED | OUTPATIENT
Start: 2018-10-30 | End: 2019-01-31

## 2018-10-30 NOTE — PROGRESS NOTES
Outpatient Prescriptions Marked as Taking for the 10/30/18 encounter (Office Visit) with Pavan Parry MD   Medication Sig Dispense Refill    atenolol (TENORMIN) 50 MG tablet TAKE 1 TABLET BY MOUTH DAILY 90 tablet 1    lisinopril (PRINIVIL;ZESTRIL) 20 MG tablet TAKE 1 TABLET BY MOUTH DAILY 90 tablet 1    metFORMIN (GLUCOPHAGE) 1000 MG tablet TAKE 1 TABLET BY MOUTH in AM and 1 1/2  tablet 1    simvastatin (ZOCOR) 20 MG tablet Take 1 tablet by mouth every evening 90 tablet 3    glucose blood VI test strips (ONE TOUCH ULTRA TEST) strip TEST EVERY  strip 3    Compression Stockings MISC by Does not apply route 1 each 1    budesonide-formoterol (SYMBICORT) 160-4.5 MCG/ACT AERO Inhale 2 puffs into the lungs 2 times daily 1 Inhaler 0    aspirin 81 MG EC tablet Take 81 mg by mouth daily. Review of Systems: 14 systems were negative except of what was stated on HPI    Nursing note and vitals reviewed. Vitals:    10/30/18 0918   BP: (!) 156/88   Pulse: 64   Temp: 98.1 °F (36.7 °C)   TempSrc: Oral   SpO2: 98%   Weight: 243 lb (110.2 kg)   Height: 5' 8\" (1.727 m)     Wt Readings from Last 3 Encounters:   10/30/18 243 lb (110.2 kg)   09/17/18 247 lb 6.4 oz (112.2 kg)   05/29/18 236 lb (107 kg)     BP Readings from Last 3 Encounters:   10/30/18 (!) 156/88   09/17/18 132/64   05/29/18 124/82     Body mass index is 36.95 kg/m². Constitutional: Patient appears well-developed and well-nourished. No distress. Head: Normocephalic and atraumatic. Neck: Normal range of motion. Neck supple. No thyroidmegaly. Cardiovascular: Normal rate, regular rhythm, normal heart sounds and intact distal pulses. Pulmonary/Chest: Effort normal and breath sounds normal. No stridor. No respiratory distress. No wheezes and no rales. Abdominal: Soft. Bowel sounds are normal. No distension and no mass. No tenderness. No rebound and no guarding. Musculoskeletal: No edema and no tenderness.    Skin: No rash or

## 2018-11-12 DIAGNOSIS — E11.9 CONTROLLED TYPE 2 DIABETES MELLITUS WITHOUT COMPLICATION, WITHOUT LONG-TERM CURRENT USE OF INSULIN (HCC): ICD-10-CM

## 2018-11-12 NOTE — TELEPHONE ENCOUNTER
glucose blood VI test strips (ONE TOUCH ULTRA TEST) strip     LAST REFILL 10/04/17  LAST AMOUNT 200         3 REFILLS   Last seen 10/30/18  Next office visit   02/26/19

## 2019-01-29 ENCOUNTER — OFFICE VISIT (OUTPATIENT)
Dept: INTERNAL MEDICINE CLINIC | Age: 69
End: 2019-01-29
Payer: MEDICARE

## 2019-01-29 ENCOUNTER — HOSPITAL ENCOUNTER (OUTPATIENT)
Age: 69
End: 2019-01-29
Payer: MEDICARE

## 2019-01-29 ENCOUNTER — HOSPITAL ENCOUNTER (OUTPATIENT)
Dept: GENERAL RADIOLOGY | Age: 69
Discharge: HOME | End: 2019-01-29
Payer: MEDICARE

## 2019-01-29 VITALS
OXYGEN SATURATION: 94 % | TEMPERATURE: 99.2 F | DIASTOLIC BLOOD PRESSURE: 86 MMHG | HEIGHT: 68 IN | SYSTOLIC BLOOD PRESSURE: 134 MMHG | WEIGHT: 250 LBS | HEART RATE: 92 BPM | BODY MASS INDEX: 37.89 KG/M2

## 2019-01-29 DIAGNOSIS — R05.9 COUGH: ICD-10-CM

## 2019-01-29 DIAGNOSIS — J20.9 BRONCHITIS, ACUTE, WITH BRONCHOSPASM: Primary | ICD-10-CM

## 2019-01-29 DIAGNOSIS — J20.9 BRONCHITIS, ACUTE, WITH BRONCHOSPASM: ICD-10-CM

## 2019-01-29 PROCEDURE — 3017F COLORECTAL CA SCREEN DOC REV: CPT | Performed by: INTERNAL MEDICINE

## 2019-01-29 PROCEDURE — G8417 CALC BMI ABV UP PARAM F/U: HCPCS | Performed by: INTERNAL MEDICINE

## 2019-01-29 PROCEDURE — G8482 FLU IMMUNIZE ORDER/ADMIN: HCPCS | Performed by: INTERNAL MEDICINE

## 2019-01-29 PROCEDURE — 1123F ACP DISCUSS/DSCN MKR DOCD: CPT | Performed by: INTERNAL MEDICINE

## 2019-01-29 PROCEDURE — 71046 X-RAY EXAM CHEST 2 VIEWS: CPT

## 2019-01-29 PROCEDURE — 1036F TOBACCO NON-USER: CPT | Performed by: INTERNAL MEDICINE

## 2019-01-29 PROCEDURE — 99213 OFFICE O/P EST LOW 20 MIN: CPT | Performed by: INTERNAL MEDICINE

## 2019-01-29 PROCEDURE — 4040F PNEUMOC VAC/ADMIN/RCVD: CPT | Performed by: INTERNAL MEDICINE

## 2019-01-29 PROCEDURE — G8427 DOCREV CUR MEDS BY ELIG CLIN: HCPCS | Performed by: INTERNAL MEDICINE

## 2019-01-29 PROCEDURE — 1101F PT FALLS ASSESS-DOCD LE1/YR: CPT | Performed by: INTERNAL MEDICINE

## 2019-01-29 RX ORDER — AZITHROMYCIN 250 MG/1
250 TABLET, FILM COATED ORAL DAILY
COMMUNITY
End: 2019-01-31

## 2019-01-29 RX ORDER — BENZONATATE 100 MG/1
100 CAPSULE ORAL 3 TIMES DAILY PRN
COMMUNITY
End: 2020-02-26

## 2019-01-29 RX ORDER — AMOXICILLIN AND CLAVULANATE POTASSIUM 875; 125 MG/1; MG/1
1 TABLET, FILM COATED ORAL 2 TIMES DAILY
Qty: 20 TABLET | Refills: 0 | Status: SHIPPED | OUTPATIENT
Start: 2019-01-29 | End: 2019-01-31 | Stop reason: SINTOL

## 2019-01-30 ENCOUNTER — TELEPHONE (OUTPATIENT)
Dept: INTERNAL MEDICINE CLINIC | Age: 69
End: 2019-01-30

## 2019-01-31 DIAGNOSIS — J20.9 BRONCHITIS, ACUTE, WITH BRONCHOSPASM: ICD-10-CM

## 2019-01-31 RX ORDER — LEVOFLOXACIN 500 MG/1
500 TABLET, FILM COATED ORAL DAILY
Qty: 7 TABLET | Refills: 0 | Status: SHIPPED | OUTPATIENT
Start: 2019-01-31 | End: 2019-02-07

## 2019-02-26 ENCOUNTER — OFFICE VISIT (OUTPATIENT)
Dept: INTERNAL MEDICINE CLINIC | Age: 69
End: 2019-02-26
Payer: MEDICARE

## 2019-02-26 VITALS
HEIGHT: 68 IN | SYSTOLIC BLOOD PRESSURE: 130 MMHG | DIASTOLIC BLOOD PRESSURE: 66 MMHG | WEIGHT: 250 LBS | HEART RATE: 71 BPM | RESPIRATION RATE: 16 BRPM | OXYGEN SATURATION: 97 % | BODY MASS INDEX: 37.89 KG/M2

## 2019-02-26 DIAGNOSIS — E11.9 CONTROLLED TYPE 2 DIABETES MELLITUS WITHOUT COMPLICATION, WITHOUT LONG-TERM CURRENT USE OF INSULIN (HCC): ICD-10-CM

## 2019-02-26 DIAGNOSIS — I10 ESSENTIAL HYPERTENSION: ICD-10-CM

## 2019-02-26 DIAGNOSIS — E78.5 HYPERLIPIDEMIA WITH TARGET LDL LESS THAN 100: ICD-10-CM

## 2019-02-26 DIAGNOSIS — Z00.00 ROUTINE GENERAL MEDICAL EXAMINATION AT A HEALTH CARE FACILITY: Primary | ICD-10-CM

## 2019-02-26 LAB
A/G RATIO: 2.1 (ref 1.1–2.2)
ALBUMIN SERPL-MCNC: 4.9 G/DL (ref 3.4–5)
ALP BLD-CCNC: 54 U/L (ref 40–129)
ALT SERPL-CCNC: 31 U/L (ref 10–40)
ANION GAP SERPL CALCULATED.3IONS-SCNC: 15 MMOL/L (ref 3–16)
AST SERPL-CCNC: 24 U/L (ref 15–37)
BASOPHILS ABSOLUTE: 0 K/UL (ref 0–0.2)
BASOPHILS RELATIVE PERCENT: 0.4 %
BILIRUB SERPL-MCNC: 0.6 MG/DL (ref 0–1)
BUN BLDV-MCNC: 19 MG/DL (ref 7–20)
CALCIUM SERPL-MCNC: 10 MG/DL (ref 8.3–10.6)
CHLORIDE BLD-SCNC: 95 MMOL/L (ref 99–110)
CHOLESTEROL, TOTAL: 134 MG/DL (ref 0–199)
CO2: 26 MMOL/L (ref 21–32)
CREAT SERPL-MCNC: 1.2 MG/DL (ref 0.8–1.3)
CREATININE URINE: 81.7 MG/DL (ref 39–259)
EOSINOPHILS ABSOLUTE: 0.1 K/UL (ref 0–0.6)
EOSINOPHILS RELATIVE PERCENT: 1.7 %
GFR AFRICAN AMERICAN: >60
GFR NON-AFRICAN AMERICAN: >60
GLOBULIN: 2.3 G/DL
GLUCOSE BLD-MCNC: 138 MG/DL (ref 70–99)
HCT VFR BLD CALC: 43.1 % (ref 40.5–52.5)
HDLC SERPL-MCNC: 48 MG/DL (ref 40–60)
HEMOGLOBIN: 14.1 G/DL (ref 13.5–17.5)
LDL CHOLESTEROL CALCULATED: 40 MG/DL
LYMPHOCYTES ABSOLUTE: 2.6 K/UL (ref 1–5.1)
LYMPHOCYTES RELATIVE PERCENT: 36 %
MCH RBC QN AUTO: 31.9 PG (ref 26–34)
MCHC RBC AUTO-ENTMCNC: 32.7 G/DL (ref 31–36)
MCV RBC AUTO: 97.5 FL (ref 80–100)
MICROALBUMIN UR-MCNC: <1.2 MG/DL
MICROALBUMIN/CREAT UR-RTO: NORMAL MG/G (ref 0–30)
MONOCYTES ABSOLUTE: 0.5 K/UL (ref 0–1.3)
MONOCYTES RELATIVE PERCENT: 7.2 %
NEUTROPHILS ABSOLUTE: 3.9 K/UL (ref 1.7–7.7)
NEUTROPHILS RELATIVE PERCENT: 54.7 %
PDW BLD-RTO: 13.3 % (ref 12.4–15.4)
PLATELET # BLD: 196 K/UL (ref 135–450)
PMV BLD AUTO: 10.3 FL (ref 5–10.5)
POTASSIUM SERPL-SCNC: 5.4 MMOL/L (ref 3.5–5.1)
RBC # BLD: 4.42 M/UL (ref 4.2–5.9)
SODIUM BLD-SCNC: 136 MMOL/L (ref 136–145)
TOTAL PROTEIN: 7.2 G/DL (ref 6.4–8.2)
TRIGL SERPL-MCNC: 232 MG/DL (ref 0–150)
VLDLC SERPL CALC-MCNC: 46 MG/DL
WBC # BLD: 7.1 K/UL (ref 4–11)

## 2019-02-26 PROCEDURE — 2022F DILAT RTA XM EVC RTNOPTHY: CPT | Performed by: INTERNAL MEDICINE

## 2019-02-26 PROCEDURE — 1036F TOBACCO NON-USER: CPT | Performed by: INTERNAL MEDICINE

## 2019-02-26 PROCEDURE — 1101F PT FALLS ASSESS-DOCD LE1/YR: CPT | Performed by: INTERNAL MEDICINE

## 2019-02-26 PROCEDURE — 36415 COLL VENOUS BLD VENIPUNCTURE: CPT | Performed by: INTERNAL MEDICINE

## 2019-02-26 PROCEDURE — G8427 DOCREV CUR MEDS BY ELIG CLIN: HCPCS | Performed by: INTERNAL MEDICINE

## 2019-02-26 PROCEDURE — 1123F ACP DISCUSS/DSCN MKR DOCD: CPT | Performed by: INTERNAL MEDICINE

## 2019-02-26 PROCEDURE — G0439 PPPS, SUBSEQ VISIT: HCPCS | Performed by: INTERNAL MEDICINE

## 2019-02-26 PROCEDURE — G8417 CALC BMI ABV UP PARAM F/U: HCPCS | Performed by: INTERNAL MEDICINE

## 2019-02-26 PROCEDURE — 3046F HEMOGLOBIN A1C LEVEL >9.0%: CPT | Performed by: INTERNAL MEDICINE

## 2019-02-26 PROCEDURE — 99214 OFFICE O/P EST MOD 30 MIN: CPT | Performed by: INTERNAL MEDICINE

## 2019-02-26 PROCEDURE — G8482 FLU IMMUNIZE ORDER/ADMIN: HCPCS | Performed by: INTERNAL MEDICINE

## 2019-02-26 PROCEDURE — 4040F PNEUMOC VAC/ADMIN/RCVD: CPT | Performed by: INTERNAL MEDICINE

## 2019-02-26 PROCEDURE — 3017F COLORECTAL CA SCREEN DOC REV: CPT | Performed by: INTERNAL MEDICINE

## 2019-02-26 RX ORDER — LISINOPRIL 20 MG/1
TABLET ORAL
Qty: 90 TABLET | Refills: 3 | Status: SHIPPED | OUTPATIENT
Start: 2019-02-26 | End: 2020-02-26 | Stop reason: SDUPTHER

## 2019-02-26 RX ORDER — ATENOLOL 50 MG/1
TABLET ORAL
Qty: 90 TABLET | Refills: 3 | Status: SHIPPED | OUTPATIENT
Start: 2019-02-26 | End: 2020-02-26 | Stop reason: SDUPTHER

## 2019-02-26 ASSESSMENT — PATIENT HEALTH QUESTIONNAIRE - PHQ9
SUM OF ALL RESPONSES TO PHQ QUESTIONS 1-9: 0
SUM OF ALL RESPONSES TO PHQ QUESTIONS 1-9: 0

## 2019-02-26 ASSESSMENT — ANXIETY QUESTIONNAIRES: GAD7 TOTAL SCORE: 0

## 2019-02-27 LAB
ESTIMATED AVERAGE GLUCOSE: 165.7 MG/DL
HBA1C MFR BLD: 7.4 %

## 2019-08-06 ENCOUNTER — OFFICE VISIT (OUTPATIENT)
Dept: INTERNAL MEDICINE CLINIC | Age: 69
End: 2019-08-06
Payer: MEDICARE

## 2019-08-06 VITALS
HEART RATE: 80 BPM | OXYGEN SATURATION: 98 % | WEIGHT: 234 LBS | BODY MASS INDEX: 35.46 KG/M2 | DIASTOLIC BLOOD PRESSURE: 76 MMHG | HEIGHT: 68 IN | SYSTOLIC BLOOD PRESSURE: 132 MMHG

## 2019-08-06 DIAGNOSIS — E78.2 MIXED HYPERLIPIDEMIA: ICD-10-CM

## 2019-08-06 DIAGNOSIS — E11.9 CONTROLLED TYPE 2 DIABETES MELLITUS WITHOUT COMPLICATION, WITHOUT LONG-TERM CURRENT USE OF INSULIN (HCC): Primary | ICD-10-CM

## 2019-08-06 DIAGNOSIS — I10 ESSENTIAL HYPERTENSION: ICD-10-CM

## 2019-08-06 LAB — HBA1C MFR BLD: 6.6 %

## 2019-08-06 PROCEDURE — 99214 OFFICE O/P EST MOD 30 MIN: CPT | Performed by: INTERNAL MEDICINE

## 2019-08-06 PROCEDURE — 1036F TOBACCO NON-USER: CPT | Performed by: INTERNAL MEDICINE

## 2019-08-06 PROCEDURE — 2022F DILAT RTA XM EVC RTNOPTHY: CPT | Performed by: INTERNAL MEDICINE

## 2019-08-06 PROCEDURE — 4040F PNEUMOC VAC/ADMIN/RCVD: CPT | Performed by: INTERNAL MEDICINE

## 2019-08-06 PROCEDURE — 3017F COLORECTAL CA SCREEN DOC REV: CPT | Performed by: INTERNAL MEDICINE

## 2019-08-06 PROCEDURE — G8427 DOCREV CUR MEDS BY ELIG CLIN: HCPCS | Performed by: INTERNAL MEDICINE

## 2019-08-06 PROCEDURE — 3044F HG A1C LEVEL LT 7.0%: CPT | Performed by: INTERNAL MEDICINE

## 2019-08-06 PROCEDURE — 1123F ACP DISCUSS/DSCN MKR DOCD: CPT | Performed by: INTERNAL MEDICINE

## 2019-08-06 PROCEDURE — 83036 HEMOGLOBIN GLYCOSYLATED A1C: CPT | Performed by: INTERNAL MEDICINE

## 2019-08-06 PROCEDURE — G8417 CALC BMI ABV UP PARAM F/U: HCPCS | Performed by: INTERNAL MEDICINE

## 2019-08-06 RX ORDER — SIMVASTATIN 20 MG
20 TABLET ORAL EVERY EVENING
Qty: 90 TABLET | Refills: 3 | Status: SHIPPED | OUTPATIENT
Start: 2019-08-06 | End: 2020-08-19

## 2019-08-06 NOTE — PROGRESS NOTES
(ZOCOR) 20 MG tablet; Take 1 tablet by mouth every evening    Essential hypertension        Return Fasting Medicare Wellness 2/26 or after.

## 2020-01-15 ENCOUNTER — OFFICE VISIT (OUTPATIENT)
Dept: INTERNAL MEDICINE CLINIC | Age: 70
End: 2020-01-15
Payer: MEDICARE

## 2020-01-15 VITALS
HEIGHT: 68 IN | WEIGHT: 242 LBS | OXYGEN SATURATION: 99 % | DIASTOLIC BLOOD PRESSURE: 62 MMHG | BODY MASS INDEX: 36.68 KG/M2 | SYSTOLIC BLOOD PRESSURE: 130 MMHG | HEART RATE: 80 BPM

## 2020-01-15 PROCEDURE — G8427 DOCREV CUR MEDS BY ELIG CLIN: HCPCS | Performed by: INTERNAL MEDICINE

## 2020-01-15 PROCEDURE — 1036F TOBACCO NON-USER: CPT | Performed by: INTERNAL MEDICINE

## 2020-01-15 PROCEDURE — 4040F PNEUMOC VAC/ADMIN/RCVD: CPT | Performed by: INTERNAL MEDICINE

## 2020-01-15 PROCEDURE — 1123F ACP DISCUSS/DSCN MKR DOCD: CPT | Performed by: INTERNAL MEDICINE

## 2020-01-15 PROCEDURE — 99213 OFFICE O/P EST LOW 20 MIN: CPT | Performed by: INTERNAL MEDICINE

## 2020-01-15 PROCEDURE — G8417 CALC BMI ABV UP PARAM F/U: HCPCS | Performed by: INTERNAL MEDICINE

## 2020-01-15 PROCEDURE — 3017F COLORECTAL CA SCREEN DOC REV: CPT | Performed by: INTERNAL MEDICINE

## 2020-01-15 PROCEDURE — G8482 FLU IMMUNIZE ORDER/ADMIN: HCPCS | Performed by: INTERNAL MEDICINE

## 2020-01-15 ASSESSMENT — PATIENT HEALTH QUESTIONNAIRE - PHQ9
SUM OF ALL RESPONSES TO PHQ QUESTIONS 1-9: 0
SUM OF ALL RESPONSES TO PHQ9 QUESTIONS 1 & 2: 0
1. LITTLE INTEREST OR PLEASURE IN DOING THINGS: 0
2. FEELING DOWN, DEPRESSED OR HOPELESS: 0
SUM OF ALL RESPONSES TO PHQ QUESTIONS 1-9: 0

## 2020-01-15 NOTE — PROGRESS NOTES
Carolee Parekh  YOB: 1950    Date of Service:  1/15/2020    Chief Complaint:      Chief Complaint   Patient presents with    Arm Pain     right     Back Pain    Neck Pain       HPI:  Maria Luisa Stockton is a 71 y.o. He complain of right neck pain about 1 month ago which has resolve with just some noise currently. He still has persistent right upper back pain pain which radiates to right upper arm intermittently. Currently no right arm pain. He was told he had bone spurs in his neck and had right shoulder surgery in 1990's. He denies any injuries.   He's been taking Advil 3 qhs and starting to get better    Lab Results   Component Value Date    LABA1C 6.6 08/06/2019    LABA1C 7.4 02/26/2019    LABA1C 6.6 09/17/2018    LABMICR <1.20 02/26/2019     Lab Results   Component Value Date     02/26/2019    K 5.4 (H) 02/26/2019    CL 95 (L) 02/26/2019    CO2 26 02/26/2019    BUN 19 02/26/2019    CREATININE 1.2 02/26/2019    GLUCOSE 138 (H) 02/26/2019    CALCIUM 10.0 02/26/2019     Lab Results   Component Value Date    CHOL 134 02/26/2019    TRIG 232 02/26/2019    HDL 48 02/26/2019    HDL 44 05/08/2012    LDLCALC 40 02/26/2019     Lab Results   Component Value Date    ALT 31 02/26/2019    AST 24 02/26/2019     Lab Results   Component Value Date    TSH 2.01 08/23/2011    TSH 1.78 03/24/2010     Lab Results   Component Value Date    WBC 7.1 02/26/2019    HGB 14.1 02/26/2019    HCT 43.1 02/26/2019    MCV 97.5 02/26/2019     02/26/2019     No results found for: INR   Lab Results   Component Value Date    PSA 0.45 02/22/2016    PSA 0.47 02/02/2015    PSA 0.52 02/26/2014      Lab Results   Component Value Date    LABURIC 8.4 (H) 08/24/2016        Patient Active Problem List   Diagnosis    Essential hypertension    Controlled type 2 diabetes mellitus without complication, without long-term current use of insulin ([address]    ED (erectile dysfunction)    Hypogonadism male    Lipodermatosclerosis Pulmonary/Chest: Effort normal and breath sounds normal. No stridor. No respiratory distress. No wheezes and no rales. Abdominal: Soft. Bowel sounds are normal. No distension and no mass. No tenderness. No rebound and no guarding. Musculoskeletal: No edema and no tenderness. Skin: No rash or erythema. Psychiatric: Normal mood and affect. Behavior is normal.   MIld right upper back/scapular discomfort on palpation. He has FROM, no asymmetry of his scapular with arm movements. Neck with FROM without much pain. No arm pain currently. Assessment/Plan:  Lizette Mendoza was seen today for arm pain, back pain and neck pain. Diagnoses and all orders for this visit:    Muscle strain of right scapular region, initial encounter    Cervicalgia    Increase Advil 2-4 tid prn since it's improving. If not better, pt to f/u with orthopedic    Return if symptoms worsen or fail to improve.

## 2020-02-26 ENCOUNTER — OFFICE VISIT (OUTPATIENT)
Dept: INTERNAL MEDICINE CLINIC | Age: 70
End: 2020-02-26
Payer: MEDICARE

## 2020-02-26 VITALS
HEIGHT: 68 IN | HEART RATE: 76 BPM | OXYGEN SATURATION: 94 % | DIASTOLIC BLOOD PRESSURE: 84 MMHG | WEIGHT: 236 LBS | SYSTOLIC BLOOD PRESSURE: 128 MMHG | BODY MASS INDEX: 35.77 KG/M2

## 2020-02-26 LAB
A/G RATIO: 2.1 (ref 1.1–2.2)
ALBUMIN SERPL-MCNC: 4.8 G/DL (ref 3.4–5)
ALP BLD-CCNC: 51 U/L (ref 40–129)
ALT SERPL-CCNC: 23 U/L (ref 10–40)
ANION GAP SERPL CALCULATED.3IONS-SCNC: 16 MMOL/L (ref 3–16)
AST SERPL-CCNC: 24 U/L (ref 15–37)
BASOPHILS ABSOLUTE: 0 K/UL (ref 0–0.2)
BASOPHILS RELATIVE PERCENT: 0.6 %
BILIRUB SERPL-MCNC: 0.5 MG/DL (ref 0–1)
BUN BLDV-MCNC: 23 MG/DL (ref 7–20)
CALCIUM SERPL-MCNC: 9.8 MG/DL (ref 8.3–10.6)
CHLORIDE BLD-SCNC: 100 MMOL/L (ref 99–110)
CHOLESTEROL, TOTAL: 97 MG/DL (ref 0–199)
CO2: 26 MMOL/L (ref 21–32)
CREAT SERPL-MCNC: 1.2 MG/DL (ref 0.8–1.3)
CREATININE URINE: 93.9 MG/DL (ref 39–259)
EOSINOPHILS ABSOLUTE: 0.1 K/UL (ref 0–0.6)
EOSINOPHILS RELATIVE PERCENT: 2.9 %
GFR AFRICAN AMERICAN: >60
GFR NON-AFRICAN AMERICAN: 60
GLOBULIN: 2.3 G/DL
GLUCOSE BLD-MCNC: 124 MG/DL (ref 70–99)
HCT VFR BLD CALC: 40 % (ref 40.5–52.5)
HDLC SERPL-MCNC: 42 MG/DL (ref 40–60)
HEMOGLOBIN: 13.5 G/DL (ref 13.5–17.5)
LDL CHOLESTEROL CALCULATED: 34 MG/DL
LYMPHOCYTES ABSOLUTE: 1.6 K/UL (ref 1–5.1)
LYMPHOCYTES RELATIVE PERCENT: 31.8 %
MCH RBC QN AUTO: 32.2 PG (ref 26–34)
MCHC RBC AUTO-ENTMCNC: 33.8 G/DL (ref 31–36)
MCV RBC AUTO: 95.4 FL (ref 80–100)
MICROALBUMIN UR-MCNC: <1.2 MG/DL
MICROALBUMIN/CREAT UR-RTO: NORMAL MG/G (ref 0–30)
MONOCYTES ABSOLUTE: 0.6 K/UL (ref 0–1.3)
MONOCYTES RELATIVE PERCENT: 11.7 %
NEUTROPHILS ABSOLUTE: 2.6 K/UL (ref 1.7–7.7)
NEUTROPHILS RELATIVE PERCENT: 53 %
PDW BLD-RTO: 12.7 % (ref 12.4–15.4)
PLATELET # BLD: 176 K/UL (ref 135–450)
PMV BLD AUTO: 10.5 FL (ref 5–10.5)
POTASSIUM SERPL-SCNC: 5 MMOL/L (ref 3.5–5.1)
RBC # BLD: 4.19 M/UL (ref 4.2–5.9)
SODIUM BLD-SCNC: 142 MMOL/L (ref 136–145)
TOTAL PROTEIN: 7.1 G/DL (ref 6.4–8.2)
TRIGL SERPL-MCNC: 107 MG/DL (ref 0–150)
VLDLC SERPL CALC-MCNC: 21 MG/DL
WBC # BLD: 5 K/UL (ref 4–11)

## 2020-02-26 PROCEDURE — G8482 FLU IMMUNIZE ORDER/ADMIN: HCPCS | Performed by: INTERNAL MEDICINE

## 2020-02-26 PROCEDURE — 36415 COLL VENOUS BLD VENIPUNCTURE: CPT | Performed by: INTERNAL MEDICINE

## 2020-02-26 PROCEDURE — 99214 OFFICE O/P EST MOD 30 MIN: CPT | Performed by: INTERNAL MEDICINE

## 2020-02-26 PROCEDURE — 2022F DILAT RTA XM EVC RTNOPTHY: CPT | Performed by: INTERNAL MEDICINE

## 2020-02-26 PROCEDURE — 4040F PNEUMOC VAC/ADMIN/RCVD: CPT | Performed by: INTERNAL MEDICINE

## 2020-02-26 PROCEDURE — 90746 HEPB VACCINE 3 DOSE ADULT IM: CPT | Performed by: INTERNAL MEDICINE

## 2020-02-26 PROCEDURE — G0010 ADMIN HEPATITIS B VACCINE: HCPCS | Performed by: INTERNAL MEDICINE

## 2020-02-26 PROCEDURE — 3046F HEMOGLOBIN A1C LEVEL >9.0%: CPT | Performed by: INTERNAL MEDICINE

## 2020-02-26 PROCEDURE — 3017F COLORECTAL CA SCREEN DOC REV: CPT | Performed by: INTERNAL MEDICINE

## 2020-02-26 PROCEDURE — G8417 CALC BMI ABV UP PARAM F/U: HCPCS | Performed by: INTERNAL MEDICINE

## 2020-02-26 PROCEDURE — 1036F TOBACCO NON-USER: CPT | Performed by: INTERNAL MEDICINE

## 2020-02-26 PROCEDURE — 1123F ACP DISCUSS/DSCN MKR DOCD: CPT | Performed by: INTERNAL MEDICINE

## 2020-02-26 PROCEDURE — G8427 DOCREV CUR MEDS BY ELIG CLIN: HCPCS | Performed by: INTERNAL MEDICINE

## 2020-02-26 PROCEDURE — G0439 PPPS, SUBSEQ VISIT: HCPCS | Performed by: INTERNAL MEDICINE

## 2020-02-26 RX ORDER — ATENOLOL 50 MG/1
TABLET ORAL
Qty: 90 TABLET | Refills: 3 | Status: SHIPPED | OUTPATIENT
Start: 2020-02-26 | End: 2021-02-17 | Stop reason: SDUPTHER

## 2020-02-26 RX ORDER — LISINOPRIL 20 MG/1
TABLET ORAL
Qty: 90 TABLET | Refills: 3 | Status: SHIPPED | OUTPATIENT
Start: 2020-02-26 | End: 2021-02-17 | Stop reason: SDUPTHER

## 2020-02-26 ASSESSMENT — LIFESTYLE VARIABLES: HOW OFTEN DO YOU HAVE A DRINK CONTAINING ALCOHOL: 0

## 2020-02-26 ASSESSMENT — PATIENT HEALTH QUESTIONNAIRE - PHQ9
SUM OF ALL RESPONSES TO PHQ QUESTIONS 1-9: 0
SUM OF ALL RESPONSES TO PHQ QUESTIONS 1-9: 0

## 2020-02-26 NOTE — PROGRESS NOTES
Carolee Parekh  YOB: 1950    Date of Service:  2/26/2020    Chief Complaint:      Chief Complaint   Patient presents with    Medicare AWV    Diabetes    Hypertension    Hyperlipidemia       HPI:  Maria Luisa Stockton is a 71 y.o. Treatment Adherence:   Medication compliance: compliant all of the time   Diet compliance: compliant most of the time   Weight trend: stable   Current exercise: remodeling house daily, walk 2-3x/week   Barriers: none   Diabetes Mellitus Type 2: Current symptoms/problems include none. Metformin 1 g 1 AM and 1 1/2 PM.   Home blood sugar records: fasting range: 120's AM and PM  Any episodes of hypoglycemia? no   Eye exam current (within one year): yes   Tobacco history: He reports that he has never smoked. He has never used smokeless tobacco.   Daily Aspirin? Yes   Known diabetic complications: none   Hypertension: Home blood pressure monitoring: Yes - 110/61-84 on Atenolol 50 mg qd and Lisinopril 20 mg qd. He is adherent to a low sodium diet. Patient denies chest pain, shortness of breath, headache, lightheadedness, blurred vision, peripheral edema, palpitations, dry cough and fatigue. Antihypertensive medication side effects: no medication side effects noted. Use of agents associated with hypertension: none. Hyperlipidemia: No new myalgias or GI upset on simvastatin (Zocor) 20 mg qhs. Trying to be on a low fat diet. Hypogonadism: he got off the Androgel due to nipple tenderness and he has not felt more tired.    ED: unchanged. able to have an erection a little easier but not much of a difference when he was on testosterone replacement.     Lab Results   Component Value Date    LABA1C 6.6 08/06/2019    LABA1C 7.4 02/26/2019    LABA1C 6.6 09/17/2018    LABMICR <1.20 02/26/2019     Lab Results   Component Value Date     02/26/2019    K 5.4 (H) 02/26/2019    CL 95 (L) 02/26/2019    CO2 26 02/26/2019    BUN 19 02/26/2019    CREATININE 1.2 02/26/2019    GLUCOSE 138 (H)

## 2020-02-26 NOTE — PROGRESS NOTES
Medicare Annual Wellness Visit  Name: Gopi Aguilar Date: 2020   MRN: <> Sex: Male   Age: 71 y.o. Ethnicity: Non-/Non    : 1950 Race: Cy Parekh is here for Medicare AWV; Diabetes; Hypertension; and Hyperlipidemia    Screenings for behavioral, psychosocial and functional/safety risks, and cognitive dysfunction are all negative except as indicated below. These results, as well as other patient data from the [address] form, are documented in Flowsheets linked to this Encounter. Allergies   Allergen Reactions    Augmentin [Amoxicillin-Pot Clavulanate]      Stomach pain with n/v    Bactrim [Sulfamethoxazole-Trimethoprim]      SOB and diaphorectic       Prior to Visit Medications    Medication Sig Taking? Authorizing Provider   simvastatin (ZOCOR) 20 MG tablet Take 1 tablet by mouth every evening Yes Allison Espinal MD   atenolol (TENORMIN) 50 MG tablet TAKE 1 TABLET BY MOUTH DAILY Yes Allison Espinal MD   lisinopril (PRINIVIL;ZESTRIL) 20 MG tablet TAKE 1 TABLET BY MOUTH DAILY Yes Allison Espinal MD   metFORMIN (GLUCOPHAGE) 1000 MG tablet TAKE 1 TABLET BY MOUTH in AM and 1 1/2 PM Yes Allison Espinal MD   benzonatate (TESSALON) 100 MG capsule Take 100 mg by mouth 3 times daily as needed for Cough Yes Historical Provider, MD   ONE TOUCH ULTRA TEST strip TEST EVERY DAY Yes Allison Espinal MD   ciprofloxacin-dexamethasone (CIPRODEX) 0.3-0.1 % otic suspension 4 drops 2 times daily Yes Historical Provider, MD   fluocinonide (LIDEX) 0.05 % ointment Apply topically daily Indications: 30 gm Apply topically 2 times daily. Yes Historical Provider, MD   Compression Stockings MISC by Does not apply route Yes Allison Espinal MD   aspirin 81 MG EC tablet Take 81 mg by mouth daily.  Yes Historical Provider, MD       Past Medical History:   Diagnosis Date    Allergic rhinitis     BPH     Colitis     ED (erectile dysfunction) 2013    ED (erectile dysfunction)     No  Do you eat fewer than 2 meals per day?:NO  Have you seen a dentist within the past year?: Yes  Body mass index is 35.88 kg/m². Health Habits/Nutrition Interventions:  · none indicated    Personalized Preventive Plan   Current Health Maintenance Status  Immunization History   Administered Date(s) Administered    Influenza 11/02/2010, 09/22/2011, 09/19/2013    Influenza Virus Vaccine 10/06/2014    Influenza, High Dose (Fluzone 65 yrs and older) 11/07/2012, 11/02/2015, 10/25/2016, 10/04/2017, 09/17/2018, 10/07/2019    Pneumococcal Conjugate 13-valent ([id number]) 08/17/2015    Pneumococcal Polysaccharide ([id number]) 08/23/2011, 10/04/2017    Tdap (Boostrix, Adacel) 02/07/2014    Tetanus 08/01/2005, 02/07/2014    Zoster Live (Zostavax) 01/31/2013    Zoster Recombinant (Shingrix) 08/06/2019, 10/07/2019        Health Maintenance   Topic Date Due    Hepatitis B vaccine (1 of 3 - Risk 3-dose series) 03/13/1969    Diabetic foot exam  02/26/2020    Diabetic microalbuminuria test  02/26/2020    Potassium monitoring  02/26/2020    Creatinine monitoring  02/26/2020    Lipid screen  02/26/2020    Annual Wellness Visit (AWV)  02/27/2020    A1C test (Diabetic or Prediabetic)  08/06/2020    Diabetic retinal exam  02/22/2021    Colon cancer screen colonoscopy  03/17/2022    DTaP/Tdap/Td vaccine (2 - Td) 02/07/2024    Flu vaccine  Completed    Shingles Vaccine  Completed    Pneumococcal 65+ years Vaccine  Completed    AAA screen  Completed    Hepatitis C screen  Completed    Hepatitis A vaccine  Aged Out    Hib vaccine  Aged Out    Meningococcal (ACWY) vaccine  Aged Out     Recommendations for [place] Due: see orders and patient instructions/AVS.  . Recommended screening schedule for the next 5-10 years is provided to the patient in written form: see Patient José Miguel Arredondo was seen today for medicare awv, diabetes, hypertension and hyperlipidemia.     Diagnoses and all orders

## 2020-02-26 NOTE — PATIENT INSTRUCTIONS
Personalized Preventive Plan for Barbara Parekh - 2/26/2020  Medicare offers a range of preventive health benefits. Some of the tests and screenings are paid in full while other may be subject to a deductible, co-insurance, and/or copay. Some of these benefits include a comprehensive review of your medical history including lifestyle, illnesses that may run in your family, and various assessments and screenings as appropriate. After reviewing your medical record and screening and assessments performed today your provider may have ordered immunizations, labs, imaging, and/or referrals for you. A list of these orders (if applicable) as well as your Preventive Care list are included within your After Visit Summary for your review. Other Preventive Recommendations:    · A preventive eye exam performed by an eye specialist is recommended every 1-2 years to screen for glaucoma; cataracts, macular degeneration, and other eye disorders. · A preventive dental visit is recommended every 6 months. · Try to get at least 150 minutes of exercise per week or 10,000 steps per day on a pedometer . · Order or download the FREE \"Exercise & Physical Activity: Your Everyday Guide\" from [employer]. Call 6-847.653.5586 or search [employer] online. · You need 7247-9784 mg of calcium and 9619-6500 IU of vitamin D per day. It is possible to meet your calcium requirement with diet alone, but a vitamin D supplement is usually necessary to meet this goal.  · When exposed to the sun, use a sunscreen that protects against both UVA and UVB radiation with an SPF of 30 or greater. Reapply every 2 to 3 hours or after sweating, drying off with a towel, or swimming. · Always wear a seat belt when traveling in a car. Always wear a helmet when riding a bicycle or motorcycle.

## 2020-02-27 LAB
ESTIMATED AVERAGE GLUCOSE: 134.1 MG/DL
HBA1C MFR BLD: 6.3 %

## 2020-06-10 ENCOUNTER — NURSE ONLY (OUTPATIENT)
Dept: INTERNAL MEDICINE CLINIC | Age: 70
End: 2020-06-10
Payer: MEDICARE

## 2020-06-10 VITALS — TEMPERATURE: 98.2 F

## 2020-06-10 PROCEDURE — 90746 HEPB VACCINE 3 DOSE ADULT IM: CPT | Performed by: INTERNAL MEDICINE

## 2020-06-10 PROCEDURE — G0010 ADMIN HEPATITIS B VACCINE: HCPCS | Performed by: INTERNAL MEDICINE

## 2020-08-19 RX ORDER — SIMVASTATIN 20 MG
20 TABLET ORAL EVERY EVENING
Qty: 90 TABLET | Refills: 3 | Status: SHIPPED | OUTPATIENT
Start: 2020-08-19 | End: 2021-09-01 | Stop reason: SINTOL

## 2020-08-26 ENCOUNTER — OFFICE VISIT (OUTPATIENT)
Dept: INTERNAL MEDICINE CLINIC | Age: 70
End: 2020-08-26
Payer: MEDICARE

## 2020-08-26 VITALS
HEIGHT: 68 IN | OXYGEN SATURATION: 99 % | HEART RATE: 80 BPM | WEIGHT: 224 LBS | DIASTOLIC BLOOD PRESSURE: 68 MMHG | SYSTOLIC BLOOD PRESSURE: 138 MMHG | BODY MASS INDEX: 33.95 KG/M2 | TEMPERATURE: 98.5 F

## 2020-08-26 PROBLEM — E66.01 MORBIDLY OBESE (HCC): Status: ACTIVE | Noted: 2020-08-26

## 2020-08-26 LAB
HBA1C MFR BLD: 6.3 %
URIC ACID, SERUM: 7.1 MG/DL (ref 3.5–7.2)

## 2020-08-26 PROCEDURE — 1036F TOBACCO NON-USER: CPT | Performed by: INTERNAL MEDICINE

## 2020-08-26 PROCEDURE — 4040F PNEUMOC VAC/ADMIN/RCVD: CPT | Performed by: INTERNAL MEDICINE

## 2020-08-26 PROCEDURE — 1123F ACP DISCUSS/DSCN MKR DOCD: CPT | Performed by: INTERNAL MEDICINE

## 2020-08-26 PROCEDURE — 99214 OFFICE O/P EST MOD 30 MIN: CPT | Performed by: INTERNAL MEDICINE

## 2020-08-26 PROCEDURE — 3044F HG A1C LEVEL LT 7.0%: CPT | Performed by: INTERNAL MEDICINE

## 2020-08-26 PROCEDURE — 3017F COLORECTAL CA SCREEN DOC REV: CPT | Performed by: INTERNAL MEDICINE

## 2020-08-26 PROCEDURE — G8427 DOCREV CUR MEDS BY ELIG CLIN: HCPCS | Performed by: INTERNAL MEDICINE

## 2020-08-26 PROCEDURE — 2022F DILAT RTA XM EVC RTNOPTHY: CPT | Performed by: INTERNAL MEDICINE

## 2020-08-26 PROCEDURE — G0010 ADMIN HEPATITIS B VACCINE: HCPCS | Performed by: INTERNAL MEDICINE

## 2020-08-26 PROCEDURE — 36415 COLL VENOUS BLD VENIPUNCTURE: CPT | Performed by: INTERNAL MEDICINE

## 2020-08-26 PROCEDURE — 90746 HEPB VACCINE 3 DOSE ADULT IM: CPT | Performed by: INTERNAL MEDICINE

## 2020-08-26 PROCEDURE — G8417 CALC BMI ABV UP PARAM F/U: HCPCS | Performed by: INTERNAL MEDICINE

## 2020-08-26 PROCEDURE — 83036 HEMOGLOBIN GLYCOSYLATED A1C: CPT | Performed by: INTERNAL MEDICINE

## 2020-08-26 RX ORDER — NAPROXEN 500 MG/1
500 TABLET ORAL 2 TIMES DAILY WITH MEALS
Qty: 60 TABLET | Refills: 0 | Status: SHIPPED | OUTPATIENT
Start: 2020-08-26 | End: 2021-09-01 | Stop reason: ALTCHOICE

## 2020-08-26 NOTE — PROGRESS NOTES
Krystyna Parekh  YOB: 1950    Date of Service:  8/26/2020    Chief Complaint:      Chief Complaint   Patient presents with    Diabetes    Hypertension    Hyperlipidemia    Hip Pain     right x 3 -4 months     Ankle Pain     left x 1 day       HPI:  Subha Suazo is a 79 y.o. He complain of right hip pain for about 3 months, no injuries. Difficult walking but he's still doing a lot of yard work at home. He also complain of left ankle pain that started yesterday AM.  He does have h/o gout 2 years ago while in [facility]. He denies eating too much meat or change in his diet. He has been working outside and not been drinking as much fluid as he should. Treatment Adherence:   Medication compliance: compliant all of the time   Diet compliance: compliant most of the time   Weight trend: stable   Current exercise: remodeling house daily, walk 2-3x/week   Barriers: none   Diabetes Mellitus Type 2: Current symptoms/problems include none. Metformin 1 g bid.   Home blood sugar records: fasting range: 150 3 weeks ago  Any episodes of hypoglycemia? no   Eye exam current (within one year): yes   Tobacco history: He reports that he has never smoked. He has never used smokeless tobacco.   Daily Aspirin? Yes   Known diabetic complications: none   Hypertension: Home blood pressure monitoring: Yes - 110/61-84 on Atenolol 50 mg qd and Lisinopril 20 mg qd. He is adherent to a low sodium diet. Patient denies chest pain, shortness of breath, headache, lightheadedness, blurred vision, peripheral edema, palpitations, dry cough and fatigue. Antihypertensive medication side effects: no medication side effects noted. Use of agents associated with hypertension: none. Hyperlipidemia: No new myalgias or GI upset on simvastatin (Zocor) 20 mg qhs. Trying to be on a low fat diet. Hypogonadism: he got off the Androgel due to nipple tenderness and he has not felt more tired.    ED: unchanged. able to have an erection a little easier but not much of a difference when he was on testosterone replacement.     Lab Results   Component Value Date    LABA1C 6.3 08/26/2020    LABA1C 6.3 02/26/2020    LABA1C 6.6 08/06/2019    LABMICR <1.20 02/26/2020     Lab Results   Component Value Date     02/26/2020    K 5.0 02/26/2020     02/26/2020    CO2 26 02/26/2020    BUN 23 (H) 02/26/2020    CREATININE 1.2 02/26/2020    GLUCOSE 124 (H) 02/26/2020    CALCIUM 9.8 02/26/2020     Lab Results   Component Value Date    CHOL 97 02/26/2020    TRIG 107 02/26/2020    HDL 42 02/26/2020    HDL 44 05/08/2012    LDLCALC 34 02/26/2020     Lab Results   Component Value Date    ALT 23 02/26/2020    AST 24 02/26/2020     Lab Results   Component Value Date    TSH 2.01 08/23/2011    TSH 1.78 03/24/2010     Lab Results   Component Value Date    WBC 5.0 02/26/2020    HGB 13.5 02/26/2020    HCT 40.0 (L) 02/26/2020    MCV 95.4 02/26/2020     02/26/2020     No results found for: INR   Lab Results   Component Value Date    PSA 0.45 02/22/2016    PSA 0.47 02/02/2015    PSA 0.52 02/26/2014      Lab Results   Component Value Date    LABURIC 8.4 (H) 08/24/2016        Patient Active Problem List   Diagnosis    Essential hypertension    Controlled type 2 diabetes mellitus without complication, without long-term current use of insulin ([facility] [address]    ED (erectile dysfunction)    Hypogonadism male    Lipodermatosclerosis    Mixed hyperlipidemia       Allergies   Allergen Reactions    Augmentin [Amoxicillin-Pot Clavulanate]      Stomach pain with n/v    Bactrim [Sulfamethoxazole-Trimethoprim]      SOB and diaphorectic     Outpatient Medications Marked as Taking for the 8/26/20 encounter (Office Visit) with Lashay Espinal MD   Medication Sig Dispense Refill    simvastatin (ZOCOR) 20 MG tablet TAKE 1 TABLET BY MOUTH EVERY EVENING 90 tablet 3    metFORMIN (GLUCOPHAGE) 1000 MG tablet TAKE 1 TABLET BY MOUTH EVERY MORNING AND 1& 1/2 TABLETS EVERY EVENING 225 tablet 3    atenolol (TENORMIN) 50 MG tablet TAKE 1 TABLET BY MOUTH DAILY 90 tablet 3    lisinopril (PRINIVIL;ZESTRIL) 20 MG tablet TAKE 1 TABLET BY MOUTH DAILY 90 tablet 3    ONE TOUCH ULTRA TEST strip TEST EVERY  strip 0    ciprofloxacin-dexamethasone (CIPRODEX) 0.3-0.1 % otic suspension 4 drops 2 times daily      fluocinonide (LIDEX) 0.05 % ointment Apply topically daily Indications: 30 gm Apply topically 2 times daily.  Compression Stockings MISC by Does not apply route 1 each 1    aspirin 81 MG EC tablet Take 81 mg by mouth daily. Review of Systems: 14 systems were negative except of what was stated on HPI    Nursing note and vitals reviewed. Vitals:    08/26/20 0948   BP: 138/68   Pulse: 80   Temp: 98.5 °F (36.9 °C)   TempSrc: Infrared   SpO2: 99%   Weight: 224 lb (101.6 kg)   Height: 5' 8\" (1.727 m)     Wt Readings from Last 3 Encounters:   08/26/20 224 lb (101.6 kg)   02/26/20 236 lb (107 kg)   01/15/20 242 lb (109.8 kg)     BP Readings from Last 3 Encounters:   08/26/20 138/68   02/26/20 128/84   01/15/20 130/62     Body mass index is 34.06 kg/m². Constitutional: Patient appears well-developed and well-nourished. No distress. Head: Normocephalic and atraumatic. Neck: Normal range of motion. Neck supple. No thyroidmegaly. Cardiovascular: Normal rate, regular rhythm, normal heart sounds and intact distal pulses. Pulmonary/Chest: Effort normal and breath sounds normal. No stridor. No respiratory distress. No wheezes and no rales. Abdominal: Soft. Bowel sounds are normal. No distension and no mass. No tenderness. No rebound and no guarding. Musculoskeletal: No edema and no tenderness. Skin: No rash or erythema. Psychiatric: Normal mood and affect. Behavior is normal.     Assessment/Plan:  Shay Monae was seen today for diabetes, hypertension, hyperlipidemia, hip pain and ankle pain.     Diagnoses and all orders for this visit:    Acute idiopathic gout of left foot  - Uric Acid  Start naproxen (NAPROSYN) 500 MG tablet; Take 1 tablet by mouth 2 times daily (with meals)    Hip pain, acute, right  Start naproxen (NAPROSYN) 500 MG tablet; Take 1 tablet by mouth 2 times daily (with meals)  -     [address] if not better    Controlled type 2 diabetes mellitus without complication, without long-term current use of insulin (HCC)  -     POCT glycosylated hemoglobin (Hb A1C)    Mixed hyperlipidemia    Essential hypertension    Morbidly obese (HCC)    Need for hepatitis B vaccination  -     Hep B Vaccine Adult (ENGERIX-B)        Return March 1 at 9:20 Fasting Medicare Wellness and f/u (20 mins).

## 2020-09-14 ENCOUNTER — TELEPHONE (OUTPATIENT)
Dept: ORTHOPEDIC SURGERY | Age: 70
End: 2020-09-14

## 2020-09-14 ENCOUNTER — OFFICE VISIT (OUTPATIENT)
Dept: ORTHOPEDIC SURGERY | Age: 70
End: 2020-09-14
Payer: MEDICARE

## 2020-09-14 VITALS — BODY MASS INDEX: 33.95 KG/M2 | HEIGHT: 68 IN | WEIGHT: 224 LBS

## 2020-09-14 PROCEDURE — 1123F ACP DISCUSS/DSCN MKR DOCD: CPT | Performed by: ORTHOPAEDIC SURGERY

## 2020-09-14 PROCEDURE — G8417 CALC BMI ABV UP PARAM F/U: HCPCS | Performed by: ORTHOPAEDIC SURGERY

## 2020-09-14 PROCEDURE — 99203 OFFICE O/P NEW LOW 30 MIN: CPT | Performed by: ORTHOPAEDIC SURGERY

## 2020-09-14 PROCEDURE — 1036F TOBACCO NON-USER: CPT | Performed by: ORTHOPAEDIC SURGERY

## 2020-09-14 PROCEDURE — 4040F PNEUMOC VAC/ADMIN/RCVD: CPT | Performed by: ORTHOPAEDIC SURGERY

## 2020-09-14 PROCEDURE — 3017F COLORECTAL CA SCREEN DOC REV: CPT | Performed by: ORTHOPAEDIC SURGERY

## 2020-09-14 PROCEDURE — G8427 DOCREV CUR MEDS BY ELIG CLIN: HCPCS | Performed by: ORTHOPAEDIC SURGERY

## 2020-09-14 NOTE — TELEPHONE ENCOUNTER
Auth: NPR  Date: 09/29/20  Type of SX: R HIP  Location: [facility]  CPT 03472   SX area: R HIP  Insurance: MEDICARE

## 2020-09-14 NOTE — PROGRESS NOTES
CHIEF COMPLAINT:    Chief Complaint   Patient presents with    Hip Pain     RIGHT HIP PAIN. LATERAL SIDED       HISTORY OF PRESENT ILLNESS:    Very pleasant gentleman who presents to me for ongoing treatment involving his right hip. He points to the groin as the site of his discomfort. Nothing in the back and nothing in the lateral side. Is been going on for couple of years but in the last few months is been quite symptomatic for him. He denies any symptoms on the contralateral side. He did have a previous low back operation with symptoms involving the Lefton lower extremity and groin. All of the symptoms have resolved after his surgery. He describes as a completely different type of pain. Denies any numbness or tingling or radiation of the pain. He has a lot of pain at nighttime and pain with getting out of the car.             The patient is a 79 y.o. male   Past Medical History:   Diagnosis Date    Allergic rhinitis     BPH     Colitis     ED (erectile dysfunction) 8/5/2013    ED (erectile dysfunction)     Gout involving toe of right foot     due to eating too much chicken that had a lot of uric acid in [facility] Hyperlipidemia     Hyperlipidemia with target LDL less than 100      replace inactive diagnosis    Hypertension     Morbidly obese ([address] 8/26/2020    Type 2 diabetes mellitus without complication ([address]     Type II or unspecified type diabetes mellitus without mention of complication, not stated as uncontrolled         Work Status: Retired    The pain assessment was noted & is as follows:  Pain Assessment  Location of Pain: Pelvis  Location Modifiers: Right  Severity of Pain: 5  Quality of Pain: Sharp, Dull, Aching  Duration of Pain: Persistent  Frequency of Pain: Intermittent  Aggravating Factors: Stairs, Walking, Bending, Stretching, Kneeling  Limiting Behavior: Some  Relieving Factors: Rest  Result of Injury: No  Work-Related Injury: No  Are there other pain locations you wish to document?: No]      Work Status/Functionality:     Past Medical History: Medical history form was reviewed today & can be found in the media tab  Past Medical History:   Diagnosis Date    Allergic rhinitis     BPH     Colitis     ED (erectile dysfunction) 8/5/2013    ED (erectile dysfunction)     Gout involving toe of right foot     due to eating too much chicken that had a lot of uric acid in [facility]    Hyperlipidemia     Hyperlipidemia with target LDL less than 100      replace inactive diagnosis    Hypertension     Morbidly obese ([facility] [address] 8/26/2020    Type 2 diabetes mellitus without complication ([facility] [address]     Type II or unspecified type diabetes mellitus without mention of complication, not stated as uncontrolled       Past Surgical History:     Past Surgical History:   Procedure Laterality Date    BACK SURGERY      COLONOSCOPY  2007    normal per pt    SHOULDER SURGERY      TONGUE SURGERY      ectactic vessel and thrombus     Current Medications:     Current Outpatient Medications:     naproxen (NAPROSYN) 500 MG tablet, Take 1 tablet by mouth 2 times daily (with meals), Disp: 60 tablet, Rfl: 0    simvastatin (ZOCOR) 20 MG tablet, TAKE 1 TABLET BY MOUTH EVERY EVENING, Disp: 90 tablet, Rfl: 3    metFORMIN (GLUCOPHAGE) 1000 MG tablet, TAKE 1 TABLET BY MOUTH EVERY MORNING AND 1& 1/2 TABLETS EVERY EVENING, Disp: 225 tablet, Rfl: 3    atenolol (TENORMIN) 50 MG tablet, TAKE 1 TABLET BY MOUTH DAILY, Disp: 90 tablet, Rfl: 3    lisinopril (PRINIVIL;ZESTRIL) 20 MG tablet, TAKE 1 TABLET BY MOUTH DAILY, Disp: 90 tablet, Rfl: 3    ONE TOUCH ULTRA TEST strip, TEST EVERY DAY, Disp: 200 strip, Rfl: 0    ciprofloxacin-dexamethasone (CIPRODEX) 0.3-0.1 % otic suspension, 4 drops 2 times daily, Disp: , Rfl:     fluocinonide (LIDEX) 0.05 % ointment, Apply topically daily Indications: 30 gm Apply topically 2 times daily. , Disp: , Rfl:     Compression Stockings MISC, by Does not apply route, Disp: 1 each, Rfl: 1    aspirin 81 MG EC tablet, Take 81 mg by mouth daily. , Disp: , Rfl:   Allergies:  Augmentin [amoxicillin-pot clavulanate] and Bactrim [sulfamethoxazole-trimethoprim]  Social History:    reports that he quit smoking about 28 years ago. His smoking use included cigarettes. He has a 5.00 pack-year smoking history. He has never used smokeless tobacco. He reports that he does not drink alcohol or use drugs. Family History:   Family History   Problem Relation Age of Onset   [place] Cancer Mother         colon    Cancer Father         lung    Cancer Sister         pancreatic    Diabetes Sister     Cancer Sister         Kidney    Other Brother         diverticulitis with partial colectomy       REVIEW OF SYSTEMS:   For new problems, a full review of systems will be found scanned in the patient's chart. CONSTITUTIONAL: Denies unexplained weight loss, fevers, chills   NEUROLOGICAL: Denies unsteady gait or progressive weakness  SKIN: Denies skin changes, delayed healing, rash, itching       PHYSICAL EXAM:    Vitals: Height 5' 7.99\" (1.727 m), weight 224 lb (101.6 kg). GENERAL EXAM:  · General Apparence: Patient is adequately groomed with no evidence of malnutrition. · Orientation: The patient is oriented to time, place and person. · Mood & Affect:The patient's mood and affect are appropriate       Right hip PHYSICAL EXAMINATION:  · Inspection: No visible asymmetry deformity or atrophy in the somewhat obese individual.    · Palpation: Tender in the groin only. Nothing over the greater trochanter nothing over the sciatic nerve and nothing over the sciatic notch. · Range of Motion: Limited. He is got internal rotation of 5 degrees right versus 15 degrees LEFT X rotation is 12 degrees right versus 20 degrees LEFT. · Strength: 5/5 bilateral    · Special Tests: Positive logroll examination on the right. Negative on the lift. · Skin:  There are no rashes, ulcerations or lesions.   · There are no distal bilateral dysvascular changes     Gait & station:       Additional Examinations:              Diagnostic Testing: The following x rays were read and interpreted by myself      2 x-ray views of the right hip including AP pelvis and lateral right hip demonstrates moderate osteoarthritic change of the right hip with osteophyte formation. Orders     Orders Placed This Encounter   Procedures    XR HIP RIGHT (2-3 VIEWS)     Standing Status:   Future     Number of Occurrences:   1     Standing Expiration Date:   9/14/2021     Order Specific Question:   Reason for exam:     Answer:   PAIN         Assessment / Treatment Plan:     1. Fairly severe right hip osteoarthritis. I do not see any signs of trochanteric bursitis or sciatica. We had a lengthy discussion regarding options. He is quite disabled by this pain is having a great deal difficulty sleeping. We are going to go ahead and try an intra-articular corticosteroid injection to the right hip from both a diagnostic and therapeutic perspective and this was discussed in detail. 2.  We discussed the risks, benefits and potential complications of surgery. The patient realizes that there are concerns with respect to infection, deep vein thrombosis, neurologic and or vascular injury as well as delayed rehabilitation. The patient also realizes that there potential anesthetic complications including cardiopulmonary issues, medication reactions and even death. The patient is aware of these risks and desires to proceed with surgery. 3.  He will be scheduled for an intra-articular right hip injection as an outpatient. I have personally performed and/or participated in the history, exam and medical decision making and agree with all pertinent clinical information. I have also reviewed and agree with the past medical, family and social history unless otherwise noted.        This dictation was performed with a verbal recognition program (DRAGON) and it was checked for errors. It is possible that there are still dictated errors within this office note. If so, please bring any errors to my attention for an addendum. All efforts were made to ensure that this office note is accurate.           Darius Vincent MD

## 2020-09-15 ENCOUNTER — TELEMEDICINE (OUTPATIENT)
Dept: INTERNAL MEDICINE CLINIC | Age: 70
End: 2020-09-15
Payer: MEDICARE

## 2020-09-15 PROCEDURE — 2022F DILAT RTA XM EVC RTNOPTHY: CPT | Performed by: INTERNAL MEDICINE

## 2020-09-15 PROCEDURE — 99214 OFFICE O/P EST MOD 30 MIN: CPT | Performed by: INTERNAL MEDICINE

## 2020-09-15 PROCEDURE — 4040F PNEUMOC VAC/ADMIN/RCVD: CPT | Performed by: INTERNAL MEDICINE

## 2020-09-15 PROCEDURE — 3017F COLORECTAL CA SCREEN DOC REV: CPT | Performed by: INTERNAL MEDICINE

## 2020-09-15 PROCEDURE — 3044F HG A1C LEVEL LT 7.0%: CPT | Performed by: INTERNAL MEDICINE

## 2020-09-15 PROCEDURE — G8427 DOCREV CUR MEDS BY ELIG CLIN: HCPCS | Performed by: INTERNAL MEDICINE

## 2020-09-15 PROCEDURE — 1123F ACP DISCUSS/DSCN MKR DOCD: CPT | Performed by: INTERNAL MEDICINE

## 2020-09-15 NOTE — PROGRESS NOTES
PREOPERATIVE CONSULTATION      Lindsey Parekh  YOB: 1950    Date of Service:  9/15/2020    Pursuant to the emergency declaration under the [facility] waiver authority and the [employer] Act, this Virtual  Video Visit was conducted, with patient's consent, to reduce the patient's risk of exposure to COVID-19 and provide continuity of care. Service is  provided through a video synchronous discussion virtually to substitute for in-person clinic visit with the patient being at home and Dr. Dickson Dang being at home. There were no vitals filed for this visit.    Wt Readings from Last 2 Encounters:   09/14/20 224 lb (101.6 kg)   08/26/20 224 lb (101.6 kg)     BP Readings from Last 3 Encounters:   08/26/20 138/68   02/26/20 128/84   01/15/20 130/62        Chief Complaint   Patient presents with    Pre-op Exam     09/29/2020- Right Hip Surgery- Dr. Destinee Mccormack      Allergies   Allergen Reactions    Augmentin [Amoxicillin-Pot Clavulanate]      Stomach pain with n/v    Bactrim [Sulfamethoxazole-Trimethoprim]      SOB and diaphorectic     Outpatient Medications Marked as Taking for the 9/15/20 encounter (Telemedicine) with Jimmy Espinal MD   Medication Sig Dispense Refill    naproxen (NAPROSYN) 500 MG tablet Take 1 tablet by mouth 2 times daily (with meals) 60 tablet 0    simvastatin (ZOCOR) 20 MG tablet TAKE 1 TABLET BY MOUTH EVERY EVENING 90 tablet 3    metFORMIN (GLUCOPHAGE) 1000 MG tablet TAKE 1 TABLET BY MOUTH EVERY MORNING AND 1& 1/2 TABLETS EVERY EVENING 225 tablet 3    atenolol (TENORMIN) 50 MG tablet TAKE 1 TABLET BY MOUTH DAILY 90 tablet 3    lisinopril (PRINIVIL;ZESTRIL) 20 MG tablet TAKE 1 TABLET BY MOUTH DAILY 90 tablet 3    ONE TOUCH ULTRA TEST strip TEST EVERY  strip 0    ciprofloxacin-dexamethasone (CIPRODEX) 0.3-0.1 % otic suspension 4 drops 2 times daily      fluocinonide (LIDEX) 0.05 % Mother         colon    Cancer Father         lung    Cancer Sister         pancreatic    Diabetes Sister     Cancer Sister         Kidney    Other Brother         diverticulitis with partial colectomy     Social History     Socioeconomic History    Marital status:      Spouse name: Not on file    Number of children: Not on file    Years of education: Not on file    Highest education level: Not on file   Occupational History    Not on file   Social Needs    Financial resource strain: Not on file    Food insecurity     Worry: Not on file     Inability: Not on file   [employer] needs     Medical: Not on file     Non-medical: Not on file   Tobacco Use    Smoking status: Former Smoker     Packs/day: 0.50     Years: 10.00     Pack years: 5.00     Types: Cigarettes     Last attempt to quit: 1992     Years since quittin.7    Smokeless tobacco: Never Used   Substance and Sexual Activity    Alcohol use: No    Drug use: No    Sexual activity: Not Currently     Partners: Female   Lifestyle    Physical activity     Days per week: Not on file     Minutes per session: Not on file    Stress: Not on file   Relationships    Social connections     Talks on phone: Not on file     Gets together: Not on file     Attends Baptism service: Not on file     Active member of club or organization: Not on file     Attends meetings of clubs or organizations: Not on file     Relationship status: Not on file    Intimate partner violence     Fear of current or ex partner: Not on file     Emotionally abused: Not on file     Physically abused: Not on file     Forced sexual activity: Not on file   Other Topics Concern    Not on file   Social History Narrative    Not on file       Review of Systems  A comprehensive review of systems was negative except for what was noted in the HPI. Physical Exam was done 2020 in the office  Constitutional: Patient is oriented to person, place, and time.  Patient appears well-developed and well-nourished. No distress. Head: Normocephalic and atraumatic. Neurological: Patient is alert and oriented to person, place, and time. Skin: No rash or erythema. Psychiatric: Patient has a normal mood and affect. Patient's speech is normal and behavior is normal. Judgment, cognition and memory are normal.       Lab Results   Component Value Date    LABA1C 6.3 08/26/2020    LABA1C 6.3 02/26/2020    LABA1C 6.6 08/06/2019    LABMICR <1.20 02/26/2020     Lab Results   Component Value Date     02/26/2020    K 5.0 02/26/2020     02/26/2020    CO2 26 02/26/2020    BUN 23 (H) 02/26/2020    CREATININE 1.2 02/26/2020    GLUCOSE 124 (H) 02/26/2020    CALCIUM 9.8 02/26/2020     Lab Results   Component Value Date    CHOL 97 02/26/2020    TRIG 107 02/26/2020    HDL 42 02/26/2020    HDL 44 05/08/2012    LDLCALC 34 02/26/2020     Lab Results   Component Value Date    ALT 23 02/26/2020    AST 24 02/26/2020     Lab Results   Component Value Date    TSH 2.01 08/23/2011    TSH 1.78 03/24/2010     Lab Results   Component Value Date    WBC 5.0 02/26/2020    HGB 13.5 02/26/2020    HCT 40.0 (L) 02/26/2020    MCV 95.4 02/26/2020     02/26/2020     No results found for: INR   Lab Results   Component Value Date    PSA 0.45 02/22/2016    PSA 0.47 02/02/2015    PSA 0.52 02/26/2014      Lab Results   Component Value Date    LABURIC 7.1 08/26/2020           Assessment:       79 y.o. patient with planned surgery as above. SURGERY SPECIFIC RISK:  none  Known risk factors for perioperative complications: Diabetes mellitus, Hypertension  Current medications which may produce withdrawal symptoms if withheld perioperatively: none      Plan:     1. Preoperative workup as follows: none  2. Change in medication regimen before surgery: Discontinue NSAIDs (Naproxen) 7 days before surgery  3.  Prophylaxis for cardiac events with perioperative beta-blockers: Not indicated  ACC/AHA indications for pre-operative beta-blocker use:    · Vascular surgery with history of postitive stress test  · Intermediate or high risk surgery with history of CAD   · Intermediate or high risk surgery with multiple clinical predictors of CAD- 2 of the following: history of compensated or prior heart failure, history of cerebrovascular disease, DM, or renal insufficiency    Routine administration of higher-dose, long-acting metoprolol in beta-blocker-naïve patients on the day of surgery, and in the absence of dose titration is associated with an overall increase in mortality. Beta-blockers should be started days to weeks prior to surgery and titrated to pulse < 70.  4. Deep vein thrombosis prophylaxis: regimen to be chosen by surgical team  5. No contraindications to planned surgery    Yin Connors was seen today for pre-op exam.    Diagnoses and all orders for this visit:    Preop exam for internal medicine    Right hip pain    Controlled type 2 diabetes mellitus without complication, without long-term current use of insulin ([facility] [address]    Essential hypertension        .       Alejandra Davenport M.D.  [facility]  Office: [phone]  Fax: [phone]

## 2020-09-23 ENCOUNTER — OFFICE VISIT (OUTPATIENT)
Dept: PRIMARY CARE CLINIC | Age: 70
End: 2020-09-23

## 2020-09-23 PROCEDURE — 99999 PR OFFICE/OUTPT VISIT,PROCEDURE ONLY: CPT | Performed by: NURSE PRACTITIONER

## 2020-09-23 NOTE — PROGRESS NOTES
Gurjit Parekh received a viral test for COVID-19. They were educated on isolation and quarantine as appropriate. For any symptoms, they were directed to seek care from their PCP, given contact information to establish with a doctor, directed to an urgent care or the emergency room.

## 2020-09-23 NOTE — PROGRESS NOTES
Obstructive Sleep Apnea (KURT) Screening     Patient:  Manjinder Chaudhari    YOB: 1950      Medical Record #:  [id number]                     Date:  9/23/2020     1. Are you a loud and/or regular snorer? []  Yes       [x] No    2. Have you been observed to gasp or stop breathing during sleep? []  Yes       [x] No    3. Do you feel tired or groggy upon awakening or do you awaken with a headache?           []  Yes       [] No    4. Are you often tired or fatigued during the wake time hours? []  Yes       [] No    5. Do you fall asleep sitting, reading, watching TV or driving? []  Yes       [] No    6. Do you often have problems with memory or concentration? []  Yes       [] No    **If patient's score is ? 3 they are considered high risk for KURT. An Anesthesia provider will evaluate the patient and develop a plan of care the day of surgery. Note:  If the patient's BMI is more than 35 kg m¯² , has neck circumference > 40 cm, and/or high blood pressure the risk is greater (© American Sleep Apnea Association, 2006).

## 2020-09-23 NOTE — PATIENT INSTRUCTIONS
Advance Care Planning  People with COVID-19 may have no symptoms, mild symptoms, such as fever, cough, and shortness of breath or they may have more severe illness, developing severe and fatal pneumonia. As a result, Advance Care Planning with attention to naming a health care decision maker (someone you trust to make healthcare decisions for you if you could not speak for yourself) and sharing other health care preferences is important BEFORE a possible health crisis. Please contact your Primary Care Provider to discuss Advance Care Planning. Preventing the Spread of Coronavirus Disease 2019 in Homes and Residential Communities  For the most recent information go to [email or web address]    Prevention steps for People with confirmed or suspected COVID-19 (including persons under investigation) who do not need to be hospitalized  and   People with confirmed COVID-19 who were hospitalized and determined to be medically stable to go home    Your healthcare provider and public health staff will evaluate whether you can be cared for at home. If it is determined that you do not need to be hospitalized and can be isolated at home, you will be monitored by staff from your local or state health department. You should follow the prevention steps below until a healthcare provider or local or state health department says you can return to your normal activities. Stay home except to get medical care  People who are mildly ill with COVID-19 are able to isolate at home during their illness. You should restrict activities outside your home, except for getting medical care. Do not go to work, school, or public areas. Avoid using public transportation, ride-sharing, or taxis. Separate yourself from other people and animals in your home  People: As much as possible, you should stay in a specific room and away from other people in your home.  Also, you should use a separate

## 2020-09-25 LAB — SARS-COV-2, NAA: NOT DETECTED

## 2020-09-26 ENCOUNTER — ANESTHESIA EVENT (OUTPATIENT)
Dept: OPERATING ROOM | Age: 70
End: 2020-09-26
Payer: MEDICARE

## 2020-09-28 NOTE — ANESTHESIA PRE PROCEDURE
TABLETS EVERY EVENING 225 tablet 3    atenolol (TENORMIN) 50 MG tablet TAKE 1 TABLET BY MOUTH DAILY 90 tablet 3    lisinopril (PRINIVIL;ZESTRIL) 20 MG tablet TAKE 1 TABLET BY MOUTH DAILY 90 tablet 3    ONE TOUCH ULTRA TEST strip TEST EVERY  strip 0    fluocinonide (LIDEX) 0.05 % ointment Apply topically daily Indications: 30 gm Apply topically 2 times daily.  Compression Stockings MISC by Does not apply route 1 each 1    aspirin 81 MG EC tablet Take 81 mg by mouth daily. Allergies:     Allergies   Allergen Reactions    Augmentin [Amoxicillin-Pot Clavulanate]      Stomach pain with n/v    Bactrim [Sulfamethoxazole-Trimethoprim]      SOB and diaphorectic       Problem List:    Patient Active Problem List   Diagnosis Code    Essential hypertension I10    Controlled type 2 diabetes mellitus without complication, without long-term current use of insulin ([facility] E11.9    ED (erectile dysfunction) N52.9    Hypogonadism male E29.1    Lipodermatosclerosis I83.10    Mixed hyperlipidemia E78.2    Morbidly obese ([address] E66.01       Past Medical History:        Diagnosis Date    Allergic rhinitis     BPH     Colitis     ED (erectile dysfunction) 8/5/2013    ED (erectile dysfunction)     Gout involving toe of right foot     due to eating too much chicken that had a lot of uric acid in [facility] Hyperlipidemia     Hyperlipidemia with target LDL less than 100      replace inactive diagnosis    Hypertension     Morbidly obese ([address] 8/26/2020    Type 2 diabetes mellitus without complication ([facility] [address]     Type II or unspecified type diabetes mellitus without mention of complication, not stated as uncontrolled        Past Surgical History:        Procedure Laterality Date    BACK SURGERY      COLONOSCOPY  2007    normal per pt    SHOULDER SURGERY      TONGUE SURGERY      ectactic vessel and thrombus       Social History:    Social History     Tobacco Use    Smoking status: Former Smoker Packs/day: 0.50     Years: 10.00     Pack years: 5.00     Types: Cigarettes     Last attempt to quit: 1992     Years since quittin.7    Smokeless tobacco: Never Used   Substance Use Topics    Alcohol use: No                                Counseling given: Not Answered      Vital Signs (Current):   Vitals:    20 1005   Weight: 224 lb (101.6 kg)   Height: 5' 8\" (1.727 m)                                              BP Readings from Last 3 Encounters:   20 138/68   20 128/84   01/15/20 130/62       NPO Status:                                                                                 BMI:   Wt Readings from Last 3 Encounters:   20 224 lb (101.6 kg)   20 224 lb (101.6 kg)   20 236 lb (107 kg)     Body mass index is 34.06 kg/m². CBC:   Lab Results   Component Value Date    WBC 5.0 2020    RBC 4.19 2020    HGB 13.5 2020    HCT 40.0 2020    MCV 95.4 2020    RDW 12.7 2020     2020       CMP:   Lab Results   Component Value Date     2020    K 5.0 2020     2020    CO2 26 2020    BUN 23 2020    CREATININE 1.2 2020    GFRAA >60 2020    GFRAA >60 2012    AGRATIO 2.1 2020    LABGLOM 60 2020    GLUCOSE 124 2020    PROT 7.1 2020    PROT 7.1 2012    CALCIUM 9.8 2020    BILITOT 0.5 2020    ALKPHOS 51 2020    AST 24 2020    ALT 23 2020       POC Tests: No results for input(s): POCGLU, POCNA, POCK, POCCL, POCBUN, POCHEMO, POCHCT in the last 72 hours.     Coags: No results found for: PROTIME, INR, APTT    HCG (If Applicable): No results found for: PREGTESTUR, PREGSERUM, HCG, HCGQUANT     ABGs: No results found for: PHART, PO2ART, LVZ7ENY, DRU0PNK, BEART, L4VRASJA     Type & Screen (If Applicable):  No results found for: LABABO, LABRH    Drug/Infectious Status (If Applicable):  No results found for: HIV, HEPCAB    COVID-19 Screening (If Applicable):   Lab Results   Component Value Date    COVID19 NOT DETECTED 2020         Anesthesia Evaluation  Patient summary reviewed no history of anesthetic complications:   Airway: Mallampati: II  TM distance: >3 FB   Neck ROM: full  Mouth opening: > = 3 FB Dental: normal exam         Pulmonary:Negative Pulmonary ROS and normal exam  breath sounds clear to auscultation                             Cardiovascular:  Exercise tolerance: good (>4 METS),   (+) hypertension:,         Rhythm: regular  Rate: normal                    Neuro/Psych:   Negative Neuro/Psych ROS              GI/Hepatic/Renal: Neg GI/Hepatic/Renal ROS            Endo/Other:    (+) Diabetes, . Abdominal:           Vascular: negative vascular ROS. Pre-Operative Diagnosis: Localized osteoarthrosis of right hip [M16.11]    79 y.o.   BMI:  Body mass index is 34.06 kg/m².      Vitals:    20 1005   Weight: 224 lb (101.6 kg)   Height: 5' 8\" (1.727 m)       Allergies   Allergen Reactions    Augmentin [Amoxicillin-Pot Clavulanate]      Stomach pain with n/v    Bactrim [Sulfamethoxazole-Trimethoprim]      SOB and diaphorectic       Social History     Tobacco Use    Smoking status: Former Smoker     Packs/day: 0.50     Years: 10.00     Pack years: 5.00     Types: Cigarettes     Last attempt to quit: 1992     Years since quittin.7    Smokeless tobacco: Never Used   Substance Use Topics    Alcohol use: No       LABS:    CBC  Lab Results   Component Value Date/Time    WBC 5.0 2020 10:05 AM    HGB 13.5 2020 10:05 AM    HCT 40.0 (L) 2020 10:05 AM     2020 10:05 AM     RENAL  Lab Results   Component Value Date/Time     2020 10:05 AM    K 5.0 2020 10:05 AM     2020 10:05 AM    CO2 26 2020 10:05 AM    BUN 23 (H) 2020 10:05 AM    CREATININE 1.2 2020 10:05 AM    GLUCOSE 124 (H) 02/26/2020 10:05 AM     COACARROL  No results found for: PROTIME, INR, APTT     Anesthesia Plan      general     ASA 2     (I discussed with the patient the risks and benefits of PIV, anesthesia, IV Narcotics, PACU. All questions were answered the patient agrees with the plan and wishes to proceed)  Induction: intravenous.                           Deborah Dumas MD   9/28/2020

## 2020-09-29 ENCOUNTER — APPOINTMENT (OUTPATIENT)
Dept: GENERAL RADIOLOGY | Age: 70
End: 2020-09-29
Attending: ORTHOPAEDIC SURGERY
Payer: MEDICARE

## 2020-09-29 ENCOUNTER — ANESTHESIA (OUTPATIENT)
Dept: OPERATING ROOM | Age: 70
End: 2020-09-29
Payer: MEDICARE

## 2020-09-29 ENCOUNTER — HOSPITAL ENCOUNTER (OUTPATIENT)
Age: 70
Setting detail: OUTPATIENT SURGERY
Discharge: HOME | End: 2020-09-29
Attending: ORTHOPAEDIC SURGERY | Admitting: ORTHOPAEDIC SURGERY
Payer: MEDICARE

## 2020-09-29 ENCOUNTER — TELEPHONE (OUTPATIENT)
Dept: ORTHOPEDIC SURGERY | Age: 70
End: 2020-09-29

## 2020-09-29 VITALS
OXYGEN SATURATION: 95 % | SYSTOLIC BLOOD PRESSURE: 116 MMHG | HEIGHT: 68 IN | TEMPERATURE: 96.8 F | HEART RATE: 58 BPM | RESPIRATION RATE: 14 BRPM | WEIGHT: 224 LBS | BODY MASS INDEX: 33.95 KG/M2 | DIASTOLIC BLOOD PRESSURE: 56 MMHG

## 2020-09-29 VITALS
OXYGEN SATURATION: 100 % | DIASTOLIC BLOOD PRESSURE: 60 MMHG | SYSTOLIC BLOOD PRESSURE: 94 MMHG | RESPIRATION RATE: 2 BRPM

## 2020-09-29 LAB
EKG ATRIAL RATE: 64 BPM
EKG DIAGNOSIS: NORMAL
EKG P AXIS: 20 DEGREES
EKG P-R INTERVAL: 222 MS
EKG Q-T INTERVAL: 396 MS
EKG QRS DURATION: 136 MS
EKG QTC CALCULATION (BAZETT): 408 MS
EKG R AXIS: 10 DEGREES
EKG T AXIS: 13 DEGREES
EKG VENTRICULAR RATE: 64 BPM
GLUCOSE BLD-MCNC: 126 MG/DL (ref 70–99)
PERFORMED ON: ABNORMAL

## 2020-09-29 PROCEDURE — 2709999900 HC NON-CHARGEABLE SUPPLY: Performed by: ORTHOPAEDIC SURGERY

## 2020-09-29 PROCEDURE — 20610 DRAIN/INJ JOINT/BURSA W/O US: CPT

## 2020-09-29 PROCEDURE — 3700000001 HC ADD 15 MINUTES (ANESTHESIA): Performed by: ORTHOPAEDIC SURGERY

## 2020-09-29 PROCEDURE — 7100000011 HC PHASE II RECOVERY - ADDTL 15 MIN: Performed by: ORTHOPAEDIC SURGERY

## 2020-09-29 PROCEDURE — 3209999900 FLUORO FOR SURGICAL PROCEDURES

## 2020-09-29 PROCEDURE — 6360000002 HC RX W HCPCS: Performed by: ORTHOPAEDIC SURGERY

## 2020-09-29 PROCEDURE — 3600000002 HC SURGERY LEVEL 2 BASE: Performed by: ORTHOPAEDIC SURGERY

## 2020-09-29 PROCEDURE — 7100000000 HC PACU RECOVERY - FIRST 15 MIN: Performed by: ORTHOPAEDIC SURGERY

## 2020-09-29 PROCEDURE — 3600000012 HC SURGERY LEVEL 2 ADDTL 15MIN: Performed by: ORTHOPAEDIC SURGERY

## 2020-09-29 PROCEDURE — 2500000003 HC RX 250 WO HCPCS: Performed by: ORTHOPAEDIC SURGERY

## 2020-09-29 PROCEDURE — 2580000003 HC RX 258: Performed by: ANESTHESIOLOGY

## 2020-09-29 PROCEDURE — 3700000000 HC ANESTHESIA ATTENDED CARE: Performed by: ORTHOPAEDIC SURGERY

## 2020-09-29 PROCEDURE — 7100000010 HC PHASE II RECOVERY - FIRST 15 MIN: Performed by: ORTHOPAEDIC SURGERY

## 2020-09-29 PROCEDURE — 7100000001 HC PACU RECOVERY - ADDTL 15 MIN: Performed by: ORTHOPAEDIC SURGERY

## 2020-09-29 PROCEDURE — 2500000003 HC RX 250 WO HCPCS: Performed by: ANESTHESIOLOGY

## 2020-09-29 PROCEDURE — 6360000004 HC RX CONTRAST MEDICATION: Performed by: ORTHOPAEDIC SURGERY

## 2020-09-29 PROCEDURE — 6360000002 HC RX W HCPCS: Performed by: NURSE ANESTHETIST, CERTIFIED REGISTERED

## 2020-09-29 RX ORDER — MORPHINE SULFATE 2 MG/ML
1 INJECTION, SOLUTION INTRAMUSCULAR; INTRAVENOUS EVERY 5 MIN PRN
Status: DISCONTINUED | OUTPATIENT
Start: 2020-09-29 | End: 2020-09-29 | Stop reason: HOSPADM

## 2020-09-29 RX ORDER — SODIUM CHLORIDE, SODIUM LACTATE, POTASSIUM CHLORIDE, CALCIUM CHLORIDE 600; 310; 30; 20 MG/100ML; MG/100ML; MG/100ML; MG/100ML
INJECTION, SOLUTION INTRAVENOUS CONTINUOUS
Status: DISCONTINUED | OUTPATIENT
Start: 2020-09-29 | End: 2020-09-29 | Stop reason: HOSPADM

## 2020-09-29 RX ORDER — MORPHINE SULFATE 2 MG/ML
2 INJECTION, SOLUTION INTRAMUSCULAR; INTRAVENOUS EVERY 5 MIN PRN
Status: DISCONTINUED | OUTPATIENT
Start: 2020-09-29 | End: 2020-09-29 | Stop reason: HOSPADM

## 2020-09-29 RX ORDER — FENTANYL CITRATE 50 UG/ML
INJECTION, SOLUTION INTRAMUSCULAR; INTRAVENOUS PRN
Status: DISCONTINUED | OUTPATIENT
Start: 2020-09-29 | End: 2020-09-29 | Stop reason: SDUPTHER

## 2020-09-29 RX ORDER — SODIUM CHLORIDE 0.9 % (FLUSH) 0.9 %
10 SYRINGE (ML) INJECTION PRN
Status: DISCONTINUED | OUTPATIENT
Start: 2020-09-29 | End: 2020-09-29 | Stop reason: HOSPADM

## 2020-09-29 RX ORDER — DIPHENHYDRAMINE HYDROCHLORIDE 50 MG/ML
12.5 INJECTION INTRAMUSCULAR; INTRAVENOUS
Status: DISCONTINUED | OUTPATIENT
Start: 2020-09-29 | End: 2020-09-29 | Stop reason: HOSPADM

## 2020-09-29 RX ORDER — LABETALOL HYDROCHLORIDE 5 MG/ML
5 INJECTION, SOLUTION INTRAVENOUS EVERY 10 MIN PRN
Status: DISCONTINUED | OUTPATIENT
Start: 2020-09-29 | End: 2020-09-29 | Stop reason: HOSPADM

## 2020-09-29 RX ORDER — MEPERIDINE HYDROCHLORIDE 50 MG/ML
12.5 INJECTION INTRAMUSCULAR; INTRAVENOUS; SUBCUTANEOUS EVERY 5 MIN PRN
Status: DISCONTINUED | OUTPATIENT
Start: 2020-09-29 | End: 2020-09-29 | Stop reason: HOSPADM

## 2020-09-29 RX ORDER — PROPOFOL 10 MG/ML
INJECTION, EMULSION INTRAVENOUS PRN
Status: DISCONTINUED | OUTPATIENT
Start: 2020-09-29 | End: 2020-09-29 | Stop reason: SDUPTHER

## 2020-09-29 RX ORDER — ONDANSETRON 2 MG/ML
4 INJECTION INTRAMUSCULAR; INTRAVENOUS PRN
Status: DISCONTINUED | OUTPATIENT
Start: 2020-09-29 | End: 2020-09-29 | Stop reason: HOSPADM

## 2020-09-29 RX ORDER — SODIUM CHLORIDE 0.9 % (FLUSH) 0.9 %
10 SYRINGE (ML) INJECTION EVERY 12 HOURS SCHEDULED
Status: DISCONTINUED | OUTPATIENT
Start: 2020-09-29 | End: 2020-09-29 | Stop reason: HOSPADM

## 2020-09-29 RX ORDER — OXYCODONE HYDROCHLORIDE AND ACETAMINOPHEN 5; 325 MG/1; MG/1
2 TABLET ORAL PRN
Status: DISCONTINUED | OUTPATIENT
Start: 2020-09-29 | End: 2020-09-29 | Stop reason: HOSPADM

## 2020-09-29 RX ORDER — HYDRALAZINE HYDROCHLORIDE 20 MG/ML
5 INJECTION INTRAMUSCULAR; INTRAVENOUS EVERY 10 MIN PRN
Status: DISCONTINUED | OUTPATIENT
Start: 2020-09-29 | End: 2020-09-29 | Stop reason: HOSPADM

## 2020-09-29 RX ORDER — PROMETHAZINE HYDROCHLORIDE 25 MG/ML
6.25 INJECTION, SOLUTION INTRAMUSCULAR; INTRAVENOUS
Status: DISCONTINUED | OUTPATIENT
Start: 2020-09-29 | End: 2020-09-29 | Stop reason: HOSPADM

## 2020-09-29 RX ORDER — OXYCODONE HYDROCHLORIDE AND ACETAMINOPHEN 5; 325 MG/1; MG/1
1 TABLET ORAL PRN
Status: DISCONTINUED | OUTPATIENT
Start: 2020-09-29 | End: 2020-09-29 | Stop reason: HOSPADM

## 2020-09-29 RX ADMIN — SODIUM CHLORIDE, POTASSIUM CHLORIDE, SODIUM LACTATE AND CALCIUM CHLORIDE: 600; 310; 30; 20 INJECTION, SOLUTION INTRAVENOUS at 07:14

## 2020-09-29 RX ADMIN — FENTANYL CITRATE 50 MCG: 50 INJECTION INTRAMUSCULAR; INTRAVENOUS at 07:14

## 2020-09-29 RX ADMIN — PROPOFOL 250 MG: 10 INJECTION, EMULSION INTRAVENOUS at 07:18

## 2020-09-29 RX ADMIN — SODIUM CHLORIDE, POTASSIUM CHLORIDE, SODIUM LACTATE AND CALCIUM CHLORIDE: 600; 310; 30; 20 INJECTION, SOLUTION INTRAVENOUS at 06:38

## 2020-09-29 RX ADMIN — FAMOTIDINE 20 MG: 10 INJECTION INTRAVENOUS at 06:43

## 2020-09-29 ASSESSMENT — PAIN SCALES - GENERAL
PAINLEVEL_OUTOF10: 0

## 2020-09-29 ASSESSMENT — PULMONARY FUNCTION TESTS
PIF_VALUE: 7
PIF_VALUE: 2
PIF_VALUE: 5
PIF_VALUE: 2
PIF_VALUE: 2
PIF_VALUE: 11
PIF_VALUE: 8
PIF_VALUE: 3
PIF_VALUE: 11
PIF_VALUE: 2
PIF_VALUE: 4
PIF_VALUE: 9
PIF_VALUE: 8
PIF_VALUE: 7
PIF_VALUE: 2
PIF_VALUE: 2
PIF_VALUE: 10
PIF_VALUE: 12
PIF_VALUE: 7
PIF_VALUE: 7
PIF_VALUE: 3

## 2020-09-29 ASSESSMENT — PAIN - FUNCTIONAL ASSESSMENT: PAIN_FUNCTIONAL_ASSESSMENT: 0-10

## 2020-09-29 NOTE — H&P
I have reviewed the history and physical and examined the patient and find no relevant changes. I have reviewed with the patient and/or family the risks, benefits, and alternatives to the procedure. Patient being given increased dosage/quantity of opoid pain medication in excess of OSMB guidelines which noted a 30 MED daily of opioids due to the fact that he/she has undergone major orthopaedic surgery as outlined in rule 4731-11-13. Dosages and further duration of the pain medication will be re-evaluated at her post op visit in 2 weeks. Patient was educated on dosing expectations and limits of prescribing as a result of the new [facility] rules enacted August 31, 2017. Please also note that this is not the initial opoid prescription issued to this patient but a continuation of medication utilized during the hospital admission as noted in the medical record. OARRS report has also been utilized to screen for any abuse history or suspicious activity as outlined in [place]. All efforts have been taken to prevent abuse potential and misuse of opioid medications including education, screening, and close clinical follow up.

## 2020-09-29 NOTE — PROGRESS NOTES
POCT      Blood Glucose: 126      (Normal Range 70-99)    PT:      (Normal Range 9.8 - 13)    INR:      (Normal Range 0.86-1.14)

## 2020-09-29 NOTE — ANESTHESIA POSTPROCEDURE EVALUATION
142                 02/26/2020 10:05 AM        K                        5.0                 02/26/2020 10:05 AM        CL                       100                 02/26/2020 10:05 AM        CO2                      26                  02/26/2020 10:05 AM        BUN                      23 (H)              02/26/2020 10:05 AM        CREATININE               1.2                 02/26/2020 10:05 AM        GLUCOSE                  124 (H)             02/26/2020 10:05 AM   COAGS  No results found for: PROTIME, INR, APTT    Intake & Output:  @53PUNL@    Nausea & Vomiting:  No    Level of Consciousness:  Awake    Pain Assessment:  Adequate analgesia    Anesthesia Complications:  No apparent anesthetic complications    SUMMARY      Vital signs stable  OK to discharge from Stage I post anesthesia care.   Care transferred from Anesthesiology department on discharge from perioperative area

## 2020-09-29 NOTE — BRIEF OP NOTE
Brief Postoperative Note      Patient: Melba Parekh  YOB: 1950  MRN: [id number]    Date of Procedure: 9/29/2020    Pre-Op Diagnosis: Localized osteoarthrosis of right hip [M16.11]    Post-Op Diagnosis: Same       Procedure(s):  ARTHROGRAM AND CORTISONE INJECTION RIGHT HIP    Surgeon(s):  Lenny Vasquez MD    Assistant:  Physician Assistant: CASSI Enciso    Anesthesia: General    Estimated Blood Loss (mL): less than 50     Complications: None    Specimens:   * No specimens in log *    Implants:  * No implants in log *      Drains: * No LDAs found *    Findings: OA    Electronically signed by CASSI Fulton on 9/29/2020 at 9:06 AM

## 2020-10-26 PROBLEM — M1A.0720 CHRONIC GOUT OF LEFT FOOT: Status: ACTIVE | Noted: 2020-10-26

## 2020-10-26 RX ORDER — ALLOPURINOL 100 MG/1
100 TABLET ORAL DAILY
Qty: 30 TABLET | Refills: 0 | Status: SHIPPED | OUTPATIENT
Start: 2020-10-26 | End: 2020-11-25 | Stop reason: DRUGHIGH

## 2020-11-24 ENCOUNTER — TELEMEDICINE (OUTPATIENT)
Dept: INTERNAL MEDICINE CLINIC | Age: 70
End: 2020-11-24
Payer: MEDICARE

## 2020-11-24 ENCOUNTER — HOSPITAL ENCOUNTER (OUTPATIENT)
Age: 70
Discharge: HOME | End: 2020-11-24
Payer: MEDICARE

## 2020-11-24 LAB — URIC ACID, SERUM: 6.2 MG/DL (ref 3.5–7.2)

## 2020-11-24 PROCEDURE — 84550 ASSAY OF BLOOD/URIC ACID: CPT

## 2020-11-24 PROCEDURE — 99213 OFFICE O/P EST LOW 20 MIN: CPT | Performed by: INTERNAL MEDICINE

## 2020-11-24 PROCEDURE — 4040F PNEUMOC VAC/ADMIN/RCVD: CPT | Performed by: INTERNAL MEDICINE

## 2020-11-24 PROCEDURE — 36415 COLL VENOUS BLD VENIPUNCTURE: CPT

## 2020-11-24 PROCEDURE — 3017F COLORECTAL CA SCREEN DOC REV: CPT | Performed by: INTERNAL MEDICINE

## 2020-11-24 PROCEDURE — 1123F ACP DISCUSS/DSCN MKR DOCD: CPT | Performed by: INTERNAL MEDICINE

## 2020-11-24 PROCEDURE — G8427 DOCREV CUR MEDS BY ELIG CLIN: HCPCS | Performed by: INTERNAL MEDICINE

## 2020-11-24 NOTE — PROGRESS NOTES
Date of Service:  11/24/2020    Chief Complaint:      Chief Complaint   Patient presents with    Gout       HPI:  Obinna Wolfe is a 79 y.o. Pursuant to the emergency declaration under the [facility] [place] [address] [facility] authority and the [employer] and [employer] Act, this Virtual  Video Visit was conducted, with patient's consent, to reduce the patient's risk of exposure to COVID-19 and provide continuity of care. Service is  provided through a video synchronous discussion virtually to substitute for in-person clinic visit with the patient being at home and Dr. Jose Manuel Castañeda being at home. Gout in left toe and ankle: resolve since been on Allopurinol. Treatment Adherence:   Medication compliance: compliant all of the time   Diet compliance: compliant most of the time   Weight trend: stable   Current exercise: remodeling house daily, walk 2-3x/week   Barriers: none   Diabetes Mellitus Type 2: Current symptoms/problems include none. Metformin 1 g bid.   Home blood sugar records: fasting range: 150 3 weeks ago  Any episodes of hypoglycemia? no   Eye exam current (within one year): yes   Tobacco history: He reports that he has never smoked. He has never used smokeless tobacco.   Daily Aspirin? Yes   Known diabetic complications: none   Hypertension: Home blood pressure monitoring: Yes - 110/61-84 on Atenolol 50 mg qd and Lisinopril 20 mg qd. He is adherent to a low sodium diet. Patient denies chest pain, shortness of breath, headache, lightheadedness, blurred vision, peripheral edema, palpitations, dry cough and fatigue. Antihypertensive medication side effects: no medication side effects noted. Use of agents associated with hypertension: none. Hyperlipidemia: No new myalgias or GI upset on simvastatin (Zocor) 20 mg qhs. Trying to be on a low fat diet.    Hypogonadism: he got off the Androgel due to nipple tenderness and he has not felt more tired.   ED: unchanged. able to have an erection a little easier but not much of a difference when he was on testosterone replacement.     Lab Results   Component Value Date    LABA1C 6.3 08/26/2020    LABA1C 6.3 02/26/2020    LABA1C 6.6 08/06/2019    LABMICR <1.20 02/26/2020     Lab Results   Component Value Date     02/26/2020    K 5.0 02/26/2020     02/26/2020    CO2 26 02/26/2020    BUN 23 (H) 02/26/2020    CREATININE 1.2 02/26/2020    GLUCOSE 124 (H) 02/26/2020    CALCIUM 9.8 02/26/2020     Lab Results   Component Value Date    CHOL 97 02/26/2020    TRIG 107 02/26/2020    HDL 42 02/26/2020    HDL 44 05/08/2012    LDLCALC 34 02/26/2020     Lab Results   Component Value Date    ALT 23 02/26/2020    AST 24 02/26/2020     Lab Results   Component Value Date    TSH 2.01 08/23/2011    TSH 1.78 03/24/2010     Lab Results   Component Value Date    WBC 5.0 02/26/2020    HGB 13.5 02/26/2020    HCT 40.0 (L) 02/26/2020    MCV 95.4 02/26/2020     02/26/2020     No results found for: INR   Lab Results   Component Value Date    PSA 0.45 02/22/2016    PSA 0.47 02/02/2015    PSA 0.52 02/26/2014      Lab Results   Component Value Date    LABURIC 7.1 08/26/2020        Patient Active Problem List   Diagnosis    Essential hypertension    Controlled type 2 diabetes mellitus without complication, without long-term current use of insulin ([facility] [address]    ED (erectile dysfunction)    Hypogonadism male    Lipodermatosclerosis    Mixed hyperlipidemia    Morbidly obese (HCC)    Chronic gout of left foot       Allergies   Allergen Reactions    Augmentin [Amoxicillin-Pot Clavulanate]      Stomach pain with n/v    Bactrim [Sulfamethoxazole-Trimethoprim]      SOB and diaphorectic     Outpatient Medications Marked as Taking for the 11/24/20 encounter (Telemedicine) with Tyrone Espinal MD   Medication Sig Dispense Refill    allopurinol (ZYLOPRIM) 100 MG tablet Take 1 tablet by mouth daily 30 tablet 0    naproxen (NAPROSYN)

## 2020-11-25 RX ORDER — ALLOPURINOL 300 MG/1
300 TABLET ORAL DAILY
Qty: 90 TABLET | Refills: 0 | Status: SHIPPED | OUTPATIENT
Start: 2020-11-25 | End: 2021-03-01 | Stop reason: SDUPTHER

## 2021-02-17 DIAGNOSIS — I10 ESSENTIAL HYPERTENSION: ICD-10-CM

## 2021-02-17 NOTE — TELEPHONE ENCOUNTER
atenolol (TENORMIN) 50 MG tablet  LAST REFILL 02/26/20  AMOUNT 90     3 REFILLS  LAST VISIT 11/24/20  NEXT VISIT 03/01/21 awv      lisinopril (PRINIVIL;ZESTRIL) 20 MG tablet  LAST REFILL 02/26/20  AMOUNT 90     3 REFILLS

## 2021-02-19 NOTE — TELEPHONE ENCOUNTER
Pharmacy called to check on status of refills for following medications:    Atenolol 50mg  Lisinopril 20 mg     Please advise.

## 2021-02-22 RX ORDER — ATENOLOL 50 MG/1
TABLET ORAL
Qty: 90 TABLET | Refills: 3 | Status: SHIPPED | OUTPATIENT
Start: 2021-02-22 | End: 2022-02-10

## 2021-02-22 RX ORDER — LISINOPRIL 20 MG/1
TABLET ORAL
Qty: 90 TABLET | Refills: 3 | Status: SHIPPED | OUTPATIENT
Start: 2021-02-22 | End: 2022-02-10

## 2021-03-01 ENCOUNTER — OFFICE VISIT (OUTPATIENT)
Dept: INTERNAL MEDICINE CLINIC | Age: 71
End: 2021-03-01
Payer: MEDICARE

## 2021-03-01 VITALS
TEMPERATURE: 97 F | OXYGEN SATURATION: 97 % | BODY MASS INDEX: 35.16 KG/M2 | DIASTOLIC BLOOD PRESSURE: 62 MMHG | WEIGHT: 232 LBS | SYSTOLIC BLOOD PRESSURE: 112 MMHG | HEIGHT: 68 IN | HEART RATE: 71 BPM

## 2021-03-01 DIAGNOSIS — M1A.0720 CHRONIC GOUT OF LEFT FOOT, UNSPECIFIED CAUSE: ICD-10-CM

## 2021-03-01 DIAGNOSIS — Z00.00 ROUTINE GENERAL MEDICAL EXAMINATION AT A HEALTH CARE FACILITY: Primary | ICD-10-CM

## 2021-03-01 DIAGNOSIS — I10 ESSENTIAL HYPERTENSION: ICD-10-CM

## 2021-03-01 DIAGNOSIS — E66.01 MORBIDLY OBESE (HCC): ICD-10-CM

## 2021-03-01 DIAGNOSIS — E11.9 CONTROLLED TYPE 2 DIABETES MELLITUS WITHOUT COMPLICATION, WITHOUT LONG-TERM CURRENT USE OF INSULIN (HCC): ICD-10-CM

## 2021-03-01 DIAGNOSIS — E78.2 MIXED HYPERLIPIDEMIA: ICD-10-CM

## 2021-03-01 LAB
A/G RATIO: 1.8 (ref 1.1–2.2)
ALBUMIN SERPL-MCNC: 4.6 G/DL (ref 3.4–5)
ALP BLD-CCNC: 69 U/L (ref 40–129)
ALT SERPL-CCNC: 14 U/L (ref 10–40)
ANION GAP SERPL CALCULATED.3IONS-SCNC: 11 MMOL/L (ref 3–16)
AST SERPL-CCNC: 17 U/L (ref 15–37)
BASOPHILS ABSOLUTE: 0 K/UL (ref 0–0.2)
BASOPHILS RELATIVE PERCENT: 0.6 %
BILIRUB SERPL-MCNC: 0.5 MG/DL (ref 0–1)
BUN BLDV-MCNC: 14 MG/DL (ref 7–20)
CALCIUM SERPL-MCNC: 9.9 MG/DL (ref 8.3–10.6)
CHLORIDE BLD-SCNC: 101 MMOL/L (ref 99–110)
CHOLESTEROL, TOTAL: 125 MG/DL (ref 0–199)
CO2: 27 MMOL/L (ref 21–32)
CREAT SERPL-MCNC: 1.1 MG/DL (ref 0.8–1.3)
CREATININE URINE: 207.1 MG/DL (ref 39–259)
EOSINOPHILS ABSOLUTE: 0.1 K/UL (ref 0–0.6)
EOSINOPHILS RELATIVE PERCENT: 1.4 %
GFR AFRICAN AMERICAN: >60
GFR NON-AFRICAN AMERICAN: >60
GLOBULIN: 2.5 G/DL
GLUCOSE BLD-MCNC: 139 MG/DL (ref 70–99)
HBA1C MFR BLD: 6 %
HCT VFR BLD CALC: 37.2 % (ref 40.5–52.5)
HDLC SERPL-MCNC: 46 MG/DL (ref 40–60)
HEMOGLOBIN: 12.3 G/DL (ref 13.5–17.5)
LDL CHOLESTEROL CALCULATED: 51 MG/DL
LYMPHOCYTES ABSOLUTE: 1.5 K/UL (ref 1–5.1)
LYMPHOCYTES RELATIVE PERCENT: 21.9 %
MCH RBC QN AUTO: 31.7 PG (ref 26–34)
MCHC RBC AUTO-ENTMCNC: 33.1 G/DL (ref 31–36)
MCV RBC AUTO: 95.7 FL (ref 80–100)
MICROALBUMIN UR-MCNC: <1.2 MG/DL
MICROALBUMIN/CREAT UR-RTO: NORMAL MG/G (ref 0–30)
MONOCYTES ABSOLUTE: 0.5 K/UL (ref 0–1.3)
MONOCYTES RELATIVE PERCENT: 6.9 %
NEUTROPHILS ABSOLUTE: 4.9 K/UL (ref 1.7–7.7)
NEUTROPHILS RELATIVE PERCENT: 69.2 %
PDW BLD-RTO: 14.1 % (ref 12.4–15.4)
PLATELET # BLD: 191 K/UL (ref 135–450)
PMV BLD AUTO: 10.1 FL (ref 5–10.5)
POTASSIUM SERPL-SCNC: 4.8 MMOL/L (ref 3.5–5.1)
RBC # BLD: 3.89 M/UL (ref 4.2–5.9)
SODIUM BLD-SCNC: 139 MMOL/L (ref 136–145)
TOTAL PROTEIN: 7.1 G/DL (ref 6.4–8.2)
TRIGL SERPL-MCNC: 141 MG/DL (ref 0–150)
URIC ACID, SERUM: 5.1 MG/DL (ref 3.5–7.2)
VLDLC SERPL CALC-MCNC: 28 MG/DL
WBC # BLD: 7 K/UL (ref 4–11)

## 2021-03-01 PROCEDURE — G8482 FLU IMMUNIZE ORDER/ADMIN: HCPCS | Performed by: INTERNAL MEDICINE

## 2021-03-01 PROCEDURE — 4040F PNEUMOC VAC/ADMIN/RCVD: CPT | Performed by: INTERNAL MEDICINE

## 2021-03-01 PROCEDURE — G8427 DOCREV CUR MEDS BY ELIG CLIN: HCPCS | Performed by: INTERNAL MEDICINE

## 2021-03-01 PROCEDURE — G0439 PPPS, SUBSEQ VISIT: HCPCS | Performed by: INTERNAL MEDICINE

## 2021-03-01 PROCEDURE — 99214 OFFICE O/P EST MOD 30 MIN: CPT | Performed by: INTERNAL MEDICINE

## 2021-03-01 PROCEDURE — 2022F DILAT RTA XM EVC RTNOPTHY: CPT | Performed by: INTERNAL MEDICINE

## 2021-03-01 PROCEDURE — 1123F ACP DISCUSS/DSCN MKR DOCD: CPT | Performed by: INTERNAL MEDICINE

## 2021-03-01 PROCEDURE — 3017F COLORECTAL CA SCREEN DOC REV: CPT | Performed by: INTERNAL MEDICINE

## 2021-03-01 PROCEDURE — 1036F TOBACCO NON-USER: CPT | Performed by: INTERNAL MEDICINE

## 2021-03-01 PROCEDURE — G8417 CALC BMI ABV UP PARAM F/U: HCPCS | Performed by: INTERNAL MEDICINE

## 2021-03-01 PROCEDURE — 36415 COLL VENOUS BLD VENIPUNCTURE: CPT | Performed by: INTERNAL MEDICINE

## 2021-03-01 PROCEDURE — 83036 HEMOGLOBIN GLYCOSYLATED A1C: CPT | Performed by: INTERNAL MEDICINE

## 2021-03-01 PROCEDURE — 3044F HG A1C LEVEL LT 7.0%: CPT | Performed by: INTERNAL MEDICINE

## 2021-03-01 RX ORDER — CLINDAMYCIN HYDROCHLORIDE 300 MG/1
600 CAPSULE ORAL SEE ADMIN INSTRUCTIONS
COMMUNITY

## 2021-03-01 RX ORDER — ALLOPURINOL 300 MG/1
300 TABLET ORAL DAILY
Qty: 90 TABLET | Refills: 3 | Status: SHIPPED | OUTPATIENT
Start: 2021-03-01 | End: 2022-03-02 | Stop reason: SDUPTHER

## 2021-03-01 ASSESSMENT — LIFESTYLE VARIABLES: HOW OFTEN DO YOU HAVE A DRINK CONTAINING ALCOHOL: 0

## 2021-03-01 ASSESSMENT — PATIENT HEALTH QUESTIONNAIRE - PHQ9
1. LITTLE INTEREST OR PLEASURE IN DOING THINGS: 0
SUM OF ALL RESPONSES TO PHQ QUESTIONS 1-9: 0
2. FEELING DOWN, DEPRESSED OR HOPELESS: 0
SUM OF ALL RESPONSES TO PHQ9 QUESTIONS 1 & 2: 0

## 2021-03-01 NOTE — PROGRESS NOTES
Medicare Annual Wellness Visit  Name: Shira Guaman Date: 3/1/2021   MRN: <> Sex: Male   Age: 79 y.o. Ethnicity: Non-/Non    : 1950 Race: Jacquie Parekh is here for Medicare AWV, Hypertension, Diabetes, and Hyperlipidemia    Screenings for behavioral, psychosocial and functional/safety risks, and cognitive dysfunction are all negative except as indicated below. These results, as well as other patient data from the [address] form, are documented in Flowsheets linked to this Encounter. Allergies   Allergen Reactions    Augmentin [Amoxicillin-Pot Clavulanate]      Stomach pain with n/v    Bactrim [Sulfamethoxazole-Trimethoprim]      SOB and diaphorectic       Prior to Visit Medications    Medication Sig Taking? Authorizing Provider   clindamycin (CLEOCIN) 300 MG capsule Take 600 mg by mouth See Admin Instructions Prior to dental procedures only Yes Historical Provider, MD   lisinopril (PRINIVIL;ZESTRIL) 20 MG tablet TAKE 1 TABLET BY MOUTH DAILY Yes Allison Espinal MD   atenolol (TENORMIN) 50 MG tablet TAKE 1 TABLET BY MOUTH DAILY Yes Allison Espinal MD   allopurinol (ZYLOPRIM) 300 MG tablet Take 1 tablet by mouth daily Yes Allison Espinal MD   naproxen (NAPROSYN) 500 MG tablet Take 1 tablet by mouth 2 times daily (with meals)  Patient taking differently: Take 500 mg by mouth 2 times daily as needed  Yes Allison Espinal MD   simvastatin (ZOCOR) 20 MG tablet TAKE 1 TABLET BY MOUTH EVERY EVENING Yes Allison Espinal MD   metFORMIN (GLUCOPHAGE) 1000 MG tablet TAKE 1 TABLET BY MOUTH EVERY MORNING AND 1& 1/2 TABLETS EVERY EVENING Yes Allison Espinal MD   ONE TOUCH ULTRA TEST strip TEST EVERY DAY Yes Allison Espinal MD   fluocinonide (LIDEX) 0.05 % ointment Apply topically daily Indications: 30 gm Apply topically 2 times daily.  Yes Historical Provider, MD   Compression Stockings MISC by Does not apply route Yes Ruma Adams MD Patient's complete Health Risk Assessment and screening values have been reviewed and are found in Flowsheets. The following problems were reviewed today and where indicated follow up appointments were made and/or referrals ordered. Positive Risk Factor Screenings with Interventions:          General Health and ACP:  General  In general, how would you say your health is?: Very Good  In the past 7 days, have you experienced any of the following?  New or Increased Pain, New or Increased Fatigue, Loneliness, Social Isolation, Stress or Anger?: None of These  Do you get the social and emotional support that you need?: Yes  Do you have a Living Will?: Yes  Advance Directives     Power of [facility] Will ACP-Advance Directive ACP-Power of     Not on File Not on File Not on File Not on File      General Health Risk Interventions:  · Full code    Health Habits/Nutrition:  Health Habits/Nutrition  Do you exercise for at least 20 minutes 2-3 times per week?: Yes  Have you lost any weight without trying in the past 3 months?: No  Do you eat only one meal per day?: No  Have you seen the dentist within the past year?: (!) No(needs to schedule)  Body mass index: (!) 35.27  Health Habits/Nutrition Interventions:  · Dental exam overdue:  patient encouraged to make appointment with his/her dentist    Hearing/Vision:  No exam data present  Hearing/Vision  Do you or your family notice any trouble with your hearing that hasn't been managed with hearing aids?: No  Do you have difficulty driving, watching TV, or doing any of your daily activities because of your eyesight?: No  Have you had an eye exam within the past year?: (!) No(needs to schedule)  Hearing/Vision Interventions:  · Vision concerns:  patient encouraged to make appointment with his/her eye specialist      Personalized Preventive Plan   Current Health Maintenance Status  Immunization History   Administered Date(s) Administered  Hepatitis B Adult (Engerix-B) 02/26/2020, 06/10/2020, 08/26/2020    Influenza 11/02/2010, 09/22/2011, 09/19/2013    Influenza Virus Vaccine 10/06/2014    Influenza, High Dose (Fluzone 65 yrs and older) 11/07/2012, 11/02/2015, 10/25/2016, 10/04/2017, 09/17/2018, 10/07/2019, 09/16/2020    Pneumococcal Conjugate 13-valent ([id number]) 08/17/2015    Pneumococcal Polysaccharide ([id number]) 08/23/2011, 10/04/2017    Tdap (Boostrix, Adacel) 02/07/2014    Tetanus 08/01/2005, 02/07/2014    Zoster Live (Zostavax) 01/31/2013    Zoster Recombinant (Shingrix) 08/06/2019, 10/07/2019        Health Maintenance   Topic Date Due    COVID-19 Vaccine (1 of 2) 03/13/1966    Annual Wellness Visit (AWV)  05/29/2019    Diabetic retinal exam  02/22/2020    Diabetic foot exam  02/26/2021    Diabetic microalbuminuria test  02/26/2021    Lipid screen  02/26/2021    A1C test (Diabetic or Prediabetic)  08/26/2021    Potassium monitoring  12/28/2021    Creatinine monitoring  12/28/2021    Colon cancer screen colonoscopy  03/17/2022    DTaP/Tdap/Td vaccine (2 - Td) 02/07/2024    Flu vaccine  Completed    Shingles Vaccine  Completed    Pneumococcal 65+ years Vaccine  Completed    AAA screen  Completed    Hepatitis C screen  Completed    Hepatitis A vaccine  Aged Out    Hib vaccine  Aged Out    Meningococcal (ACWY) vaccine  Aged Out     Recommendations for [employer] Due: see orders and patient instructions/AVS.  . Recommended screening schedule for the next 5-10 years is provided to the patient in written form: see Patient Yonas Purcell was seen today for medicare awv, hypertension, diabetes and hyperlipidemia.     Diagnoses and all orders for this visit:    Routine general medical examination at a health care facility

## 2021-03-01 NOTE — PROGRESS NOTES
LABMICR Not Indicated 12/28/2020     Lab Results   Component Value Date     12/28/2020    K 4.7 12/28/2020     12/28/2020    CO2 28 12/28/2020    BUN 17 12/28/2020    CREATININE 1.1 12/28/2020    GLUCOSE 95 12/28/2020    CALCIUM 9.5 12/28/2020     Lab Results   Component Value Date    CHOL 97 02/26/2020    TRIG 107 02/26/2020    HDL 42 02/26/2020    HDL 44 05/08/2012    LDLCALC 34 02/26/2020     Lab Results   Component Value Date    ALT 23 02/26/2020    AST 24 02/26/2020     Lab Results   Component Value Date    TSH 2.01 08/23/2011    TSH 1.78 03/24/2010     Lab Results   Component Value Date    WBC 7.8 12/28/2020    HGB 12.5 (L) 12/28/2020    HCT 37.9 (L) 12/28/2020    MCV 96.6 12/28/2020     12/28/2020     Lab Results   Component Value Date    INR 1.04 12/28/2020      Lab Results   Component Value Date    PSA 0.45 02/22/2016    PSA 0.47 02/02/2015    PSA 0.52 02/26/2014      Lab Results   Component Value Date    LABURIC 6.2 11/24/2020        Patient Active Problem List   Diagnosis    Essential hypertension    Controlled type 2 diabetes mellitus without complication, without long-term current use of insulin ([facility] [address]    ED (erectile dysfunction)    Hypogonadism male    Lipodermatosclerosis    Mixed hyperlipidemia    Morbidly obese (HCC)    Chronic gout of left foot       Allergies   Allergen Reactions    Augmentin [Amoxicillin-Pot Clavulanate]      Stomach pain with n/v    Bactrim [Sulfamethoxazole-Trimethoprim]      SOB and diaphorectic     Outpatient Medications Marked as Taking for the 3/1/21 encounter (Office Visit) with Jean Espinal MD   Medication Sig Dispense Refill    clindamycin (CLEOCIN) 300 MG capsule Take 600 mg by mouth See Admin Instructions Prior to dental procedures only      lisinopril (PRINIVIL;ZESTRIL) 20 MG tablet TAKE 1 TABLET BY MOUTH DAILY 90 tablet 3    atenolol (TENORMIN) 50 MG tablet TAKE 1 TABLET BY MOUTH DAILY 90 tablet 3  allopurinol (ZYLOPRIM) 300 MG tablet Take 1 tablet by mouth daily 90 tablet 0    naproxen (NAPROSYN) 500 MG tablet Take 1 tablet by mouth 2 times daily (with meals) (Patient taking differently: Take 500 mg by mouth 2 times daily as needed ) 60 tablet 0    simvastatin (ZOCOR) 20 MG tablet TAKE 1 TABLET BY MOUTH EVERY EVENING 90 tablet 3    metFORMIN (GLUCOPHAGE) 1000 MG tablet TAKE 1 TABLET BY MOUTH EVERY MORNING AND 1& 1/2 TABLETS EVERY EVENING 225 tablet 3    ONE TOUCH ULTRA TEST strip TEST EVERY  strip 0    fluocinonide (LIDEX) 0.05 % ointment Apply topically daily Indications: 30 gm Apply topically 2 times daily.  Compression Stockings MISC by Does not apply route 1 each 1    aspirin 81 MG EC tablet Take 81 mg by mouth daily. Review of Systems: 14 systems were negative except of what was stated on HPI    Nursing note and vitals reviewed. Vitals:    03/01/21 0824   BP: 112/62   Pulse: 71   Temp: 97 °F (36.1 °C)   TempSrc: Infrared   SpO2: 97%   Weight: 232 lb (105.2 kg)   Height: 5' 8\" (1.727 m)     Wt Readings from Last 3 Encounters:   03/01/21 232 lb (105.2 kg)   09/29/20 224 lb (101.6 kg)   09/14/20 224 lb (101.6 kg)     BP Readings from Last 3 Encounters:   03/01/21 112/62   09/29/20 94/60   09/29/20 (!) 116/56     Body mass index is 35.28 kg/m². Constitutional: Patient appears well-developed and well-nourished. No distress. Head: Normocephalic and atraumatic. Neck: Normal range of motion. Neck supple. No thyroidmegaly. Cardiovascular: Normal rate, regular rhythm, normal heart sounds and intact distal pulses. Pulmonary/Chest: Effort normal and breath sounds normal. No stridor. No respiratory distress. No wheezes and no rales. Abdominal: Soft. Bowel sounds are normal. No distension and no mass. No tenderness. No rebound and no guarding. Musculoskeletal: No edema and no tenderness. Skin: No rash or erythema. Psychiatric: Normal mood and affect. Behavior is normal.   Diabetic Foot exam: Normal pedal pulses and bilateral sensation with 10 gm filament pin prick testing. No lesions, sores/ulcerations, toe nail fungus or deformities. Assessment/Plan:  Solo Kraft was seen today for medicare awv, hypertension, diabetes and hyperlipidemia. Diagnoses and all orders for this visit:    Routine general medical examination at a health care facility    Controlled type 2 diabetes mellitus without complication, without long-term current use of insulin (HCC)  -     POCT glycosylated hemoglobin (Hb A1C)  -     Microalbumin / Creatinine Urine Ratio  Reduce metFORMIN (GLUCOPHAGE) 1000 MG tablet; TAKE 1 TABLET BY MOUTH bid  Diabetic Foot Exam    Essential hypertension  -     Comprehensive Metabolic Panel  -     CBC Auto Differential    Mixed hyperlipidemia  -     Lipid Panel    Chronic gout of left foot, unspecified cause  -     allopurinol (ZYLOPRIM) 300 MG tablet; Take 1 tablet by mouth daily  -     Uric Acid    Morbidly obese (HCC)    Goals:   -Eat 4-5 times daily  -Avoid high fat and high sugar foods  -Include protein with all meals and snacks  -Avoid carbonation and caffeine  -Avoid calorie containing beverages  -Increase physical activity as tolerated      Return 6 months on DM.

## 2021-03-01 NOTE — PATIENT INSTRUCTIONS
Personalized Preventive Plan for Shiloh Parekh - 3/1/2021  Medicare offers a range of preventive health benefits. Some of the tests and screenings are paid in full while other may be subject to a deductible, co-insurance, and/or copay. Some of these benefits include a comprehensive review of your medical history including lifestyle, illnesses that may run in your family, and various assessments and screenings as appropriate. After reviewing your medical record and screening and assessments performed today your provider may have ordered immunizations, labs, imaging, and/or referrals for you. A list of these orders (if applicable) as well as your Preventive Care list are included within your After Visit Summary for your review. Other Preventive Recommendations:    · A preventive eye exam performed by an eye specialist is recommended every 1-2 years to screen for glaucoma; cataracts, macular degeneration, and other eye disorders. · A preventive dental visit is recommended every 6 months. · Try to get at least 150 minutes of exercise per week or 10,000 steps per day on a pedometer . · Order or download the FREE \"Exercise & Physical Activity: Your Everyday Guide\" from [employer]. Call 6-613.706.6574 or search [employer] online. · You need 5836-6963 mg of calcium and 4031-9840 IU of vitamin D per day. It is possible to meet your calcium requirement with diet alone, but a vitamin D supplement is usually necessary to meet this goal.  · When exposed to the sun, use a sunscreen that protects against both UVA and UVB radiation with an SPF of 30 or greater. Reapply every 2 to 3 hours or after sweating, drying off with a towel, or swimming. · Always wear a seat belt when traveling in a car. Always wear a helmet when riding a bicycle or motorcycle.

## 2021-08-25 LAB — DIABETIC RETINOPATHY: NEGATIVE

## 2021-09-01 ENCOUNTER — OFFICE VISIT (OUTPATIENT)
Dept: INTERNAL MEDICINE CLINIC | Age: 71
End: 2021-09-01
Payer: MEDICARE

## 2021-09-01 VITALS
OXYGEN SATURATION: 98 % | BODY MASS INDEX: 36.22 KG/M2 | WEIGHT: 239 LBS | HEART RATE: 78 BPM | HEIGHT: 68 IN | DIASTOLIC BLOOD PRESSURE: 62 MMHG | SYSTOLIC BLOOD PRESSURE: 126 MMHG

## 2021-09-01 DIAGNOSIS — I45.10 RBBB: ICD-10-CM

## 2021-09-01 DIAGNOSIS — E66.01 MORBIDLY OBESE (HCC): ICD-10-CM

## 2021-09-01 DIAGNOSIS — R07.89 ATYPICAL CHEST PAIN: ICD-10-CM

## 2021-09-01 DIAGNOSIS — M1A.0720 CHRONIC GOUT OF LEFT FOOT, UNSPECIFIED CAUSE: ICD-10-CM

## 2021-09-01 DIAGNOSIS — E78.2 MIXED HYPERLIPIDEMIA: ICD-10-CM

## 2021-09-01 DIAGNOSIS — R61 DIAPHORESIS: ICD-10-CM

## 2021-09-01 DIAGNOSIS — E11.9 CONTROLLED TYPE 2 DIABETES MELLITUS WITHOUT COMPLICATION, WITHOUT LONG-TERM CURRENT USE OF INSULIN (HCC): Primary | ICD-10-CM

## 2021-09-01 DIAGNOSIS — I10 ESSENTIAL HYPERTENSION: ICD-10-CM

## 2021-09-01 LAB — HBA1C MFR BLD: 6.6 %

## 2021-09-01 PROCEDURE — G8427 DOCREV CUR MEDS BY ELIG CLIN: HCPCS | Performed by: INTERNAL MEDICINE

## 2021-09-01 PROCEDURE — 2022F DILAT RTA XM EVC RTNOPTHY: CPT | Performed by: INTERNAL MEDICINE

## 2021-09-01 PROCEDURE — 99214 OFFICE O/P EST MOD 30 MIN: CPT | Performed by: INTERNAL MEDICINE

## 2021-09-01 PROCEDURE — G8417 CALC BMI ABV UP PARAM F/U: HCPCS | Performed by: INTERNAL MEDICINE

## 2021-09-01 PROCEDURE — 1036F TOBACCO NON-USER: CPT | Performed by: INTERNAL MEDICINE

## 2021-09-01 PROCEDURE — 3017F COLORECTAL CA SCREEN DOC REV: CPT | Performed by: INTERNAL MEDICINE

## 2021-09-01 PROCEDURE — 3044F HG A1C LEVEL LT 7.0%: CPT | Performed by: INTERNAL MEDICINE

## 2021-09-01 PROCEDURE — 93000 ELECTROCARDIOGRAM COMPLETE: CPT | Performed by: INTERNAL MEDICINE

## 2021-09-01 PROCEDURE — 83036 HEMOGLOBIN GLYCOSYLATED A1C: CPT | Performed by: INTERNAL MEDICINE

## 2021-09-01 PROCEDURE — 1123F ACP DISCUSS/DSCN MKR DOCD: CPT | Performed by: INTERNAL MEDICINE

## 2021-09-01 PROCEDURE — 4040F PNEUMOC VAC/ADMIN/RCVD: CPT | Performed by: INTERNAL MEDICINE

## 2021-09-01 RX ORDER — ROSUVASTATIN CALCIUM 5 MG/1
5 TABLET, COATED ORAL NIGHTLY
Qty: 90 TABLET | Refills: 1 | Status: SHIPPED | OUTPATIENT
Start: 2021-09-01 | End: 2022-03-02 | Stop reason: SDUPTHER

## 2021-09-01 SDOH — ECONOMIC STABILITY: FOOD INSECURITY: WITHIN THE PAST 12 MONTHS, YOU WORRIED THAT YOUR FOOD WOULD RUN OUT BEFORE YOU GOT MONEY TO BUY MORE.: NEVER TRUE

## 2021-09-01 SDOH — ECONOMIC STABILITY: FOOD INSECURITY: WITHIN THE PAST 12 MONTHS, THE FOOD YOU BOUGHT JUST DIDN'T LAST AND YOU DIDN'T HAVE MONEY TO GET MORE.: NEVER TRUE

## 2021-09-01 ASSESSMENT — SOCIAL DETERMINANTS OF HEALTH (SDOH): HOW HARD IS IT FOR YOU TO PAY FOR THE VERY BASICS LIKE FOOD, HOUSING, MEDICAL CARE, AND HEATING?: NOT HARD AT ALL

## 2021-09-01 NOTE — PROGRESS NOTES
[facility]  YOB: 1950    Date of Service:  9/1/2021    Chief Complaint:      Chief Complaint   Patient presents with    Diabetes    Excessive Sweating    Chest Pain       Assessment/Plan:  2000 Ulises Munoz was seen today for diabetes, excessive sweating and chest pain. Diagnoses and all orders for this visit:    Controlled type 2 diabetes mellitus without complication, without long-term current use of insulin (HCC)  -     POCT glycosylated hemoglobin (Hb A1C)  -     metFORMIN (GLUCOPHAGE) 1000 MG tablet; Take 1 tablet by mouth 2 times daily (with meals)    Mixed hyperlipidemia  -     rosuvastatin (CRESTOR) 5 MG tablet; Take 1 tablet by mouth nightly    Atypical chest pain  -     EKG 12 Lead  -     Trupti - Briseida Krueger MD, Cardiology, Morgan Sesay MD, Cardiology, [facility]    ANGELIC  -     Rodrigo Juárez MD, Cardiology, [facility]    Essential hypertension    Chronic gout of left foot, unspecified cause    Morbidly obese ([facility] [address]    Goals:   -Eat small amounts of food 4-5 times daily  -Avoid high fat and high sugar foods  -Include protein with all meals and snacks  -Avoid carbonation and caffeine  -Avoid calorie containing beverages  -Increase physical activity as tolerated    Stable and continue on current medications. Return 3/1 or after for Fasting Medicare Wellness and f/u. HPI:  Emely Langford is a 70 y.o. He complains of intermittent sweating with some chest pressure and SOB when he's exerting himself for the past 6 months. Symptoms do go away with rest.    Treatment Adherence:   Medication compliance: compliant all of the time   Diet compliance: compliant most of the time   Weight trend: stable   Current exercise: remodeling house daily, walk 2-3x/week   Barriers: none   Diabetes Mellitus Type 2: Current symptoms/problems include none.  Metformin 1 g bid.   Home blood sugar records: fasting range: 130-150 AM  Any episodes of hypoglycemia? no   Eye exam current (within one year): yes   Tobacco history: He reports that he has never smoked. He has never used smokeless tobacco.   Daily Aspirin? Yes   Known diabetic complications: none   Hypertension: Home blood pressure monitoring: Yes - 110/61-84 on Atenolol 50 mg qd and Lisinopril 20 mg qd. He is adherent to a low sodium diet. Patient denies chest pain, shortness of breath, headache, lightheadedness, blurred vision, peripheral edema, palpitations, dry cough and fatigue. Antihypertensive medication side effects: no medication side effects noted. Use of agents associated with hypertension: none. Hyperlipidemia: myalgia resolve off simvastatin (Zocor) 20 mg qhs for 2 weeks. Trying to be on a low fat diet. Gout in left toe and ankle: resolve since been on Allopurinol 300 mg qd. Hypogonadism: he got off the Androgel due to nipple tenderness and he has not felt more tired.    ED: unchanged. able to have an erection a little easier but not much of a difference when he was on testosterone replacement.     Lab Results   Component Value Date    LABA1C 6.0 03/01/2021    LABA1C 6.3 08/26/2020    LABA1C 6.3 02/26/2020    LABMICR <1.20 03/01/2021     Lab Results   Component Value Date     03/01/2021    K 4.8 03/01/2021     03/01/2021    CO2 27 03/01/2021    BUN 14 03/01/2021    CREATININE 1.1 03/01/2021    GLUCOSE 139 (H) 03/01/2021    CALCIUM 9.9 03/01/2021     Lab Results   Component Value Date    CHOL 125 03/01/2021    TRIG 141 03/01/2021    HDL 46 03/01/2021    HDL 44 05/08/2012    LDLCALC 51 03/01/2021     Lab Results   Component Value Date    ALT 14 03/01/2021    AST 17 03/01/2021     Lab Results   Component Value Date    TSH 2.01 08/23/2011    TSH 1.78 03/24/2010     Lab Results   Component Value Date    WBC 7.0 03/01/2021    HGB 12.3 (L) 03/01/2021    HCT 37.2 (L) 03/01/2021    MCV 95.7 03/01/2021     03/01/2021     Lab Results   Component Value Date    INR 1.04 12/28/2020      Lab Results   Component Value Date    PSA 0.45 02/22/2016    PSA 0.47 02/02/2015    PSA 0.52 02/26/2014      Lab Results   Component Value Date    LABURIC 5.1 03/01/2021        Patient Active Problem List   Diagnosis    Essential hypertension    Controlled type 2 diabetes mellitus without complication, without long-term current use of insulin ([facility] [address]    ED (erectile dysfunction)    Hypogonadism male    Lipodermatosclerosis    Mixed hyperlipidemia    Morbidly obese (HCC)    Chronic gout of left foot       Allergies   Allergen Reactions    Augmentin [Amoxicillin-Pot Clavulanate]      Stomach pain with n/v    Bactrim [Sulfamethoxazole-Trimethoprim]      SOB and diaphorectic     Outpatient Medications Marked as Taking for the 9/1/21 encounter (Office Visit) with Baljeet Espinal MD   Medication Sig Dispense Refill    clindamycin (CLEOCIN) 300 MG capsule Take 600 mg by mouth See Admin Instructions Prior to dental procedures only      allopurinol (ZYLOPRIM) 300 MG tablet Take 1 tablet by mouth daily 90 tablet 3    metFORMIN (GLUCOPHAGE) 1000 MG tablet Take 1 tablet by mouth 2 times daily (with meals) 180 tablet 1    lisinopril (PRINIVIL;ZESTRIL) 20 MG tablet TAKE 1 TABLET BY MOUTH DAILY 90 tablet 3    atenolol (TENORMIN) 50 MG tablet TAKE 1 TABLET BY MOUTH DAILY 90 tablet 3    naproxen (NAPROSYN) 500 MG tablet Take 1 tablet by mouth 2 times daily (with meals) (Patient taking differently: Take 500 mg by mouth 2 times daily as needed ) 60 tablet 0    simvastatin (ZOCOR) 20 MG tablet TAKE 1 TABLET BY MOUTH EVERY EVENING 90 tablet 3    ONE TOUCH ULTRA TEST strip TEST EVERY  strip 0    fluocinonide (LIDEX) 0.05 % ointment Apply topically daily Indications: 30 gm Apply topically 2 times daily.  Compression Stockings MISC by Does not apply route 1 each 1    aspirin 81 MG EC tablet Take 81 mg by mouth daily.            Review of Systems: 14 systems were negative except of what was stated

## 2021-09-14 DIAGNOSIS — I10 ESSENTIAL HYPERTENSION: Primary | ICD-10-CM

## 2021-09-14 DIAGNOSIS — Z00.00 PHYSICAL EXAM: ICD-10-CM

## 2021-09-15 ENCOUNTER — OFFICE VISIT (OUTPATIENT)
Dept: CARDIOLOGY CLINIC | Age: 71
End: 2021-09-15
Payer: MEDICARE

## 2021-09-15 VITALS
SYSTOLIC BLOOD PRESSURE: 122 MMHG | HEART RATE: 69 BPM | HEIGHT: 68 IN | BODY MASS INDEX: 36.83 KG/M2 | WEIGHT: 243 LBS | OXYGEN SATURATION: 99 % | DIASTOLIC BLOOD PRESSURE: 80 MMHG

## 2021-09-15 DIAGNOSIS — R06.02 SOB (SHORTNESS OF BREATH): Primary | ICD-10-CM

## 2021-09-15 DIAGNOSIS — Z87.891 HISTORY OF TOBACCO ABUSE: ICD-10-CM

## 2021-09-15 PROCEDURE — G8427 DOCREV CUR MEDS BY ELIG CLIN: HCPCS | Performed by: INTERNAL MEDICINE

## 2021-09-15 PROCEDURE — 99204 OFFICE O/P NEW MOD 45 MIN: CPT | Performed by: INTERNAL MEDICINE

## 2021-09-15 PROCEDURE — G8417 CALC BMI ABV UP PARAM F/U: HCPCS | Performed by: INTERNAL MEDICINE

## 2021-09-15 PROCEDURE — 93000 ELECTROCARDIOGRAM COMPLETE: CPT | Performed by: INTERNAL MEDICINE

## 2021-09-15 NOTE — PATIENT INSTRUCTIONS
Your provider has ordered testing for further evaluation. An order/prescription has been included in your paper work.  To schedule outpatient testing, contact Central Scheduling by calling [employer] ([phone]). Plan:  1. Echocardiogram for SOB. 2. Nuclear stress test for SOB and diaphoresis. 4 days before test- cut atenolol in half. 2 days prior to test- do not take atenolol at all. 3. Repeat abdominal US- family history AAA  4. Follow up in 3 months.

## 2021-09-15 NOTE — PROGRESS NOTES
[facility]   Cardiac Consultation    Referring Provider:  Emerald Rooney MD     Chief Complaint   Patient presents with    New Patient        History of Present Illness:  Mr. Carmine Palomo is a 79yo male here today as new patient referred from PCP, Dr. Zeenat Damian, for c/o SOB, diaphoresis, and RBBB. He has PMH significant for HLD, HTN, type 2 DM, RBBB, tobacco abuse (1ppd smoker x 20 years---quit 2002), obesity, and gout. Patient had a visit with his PCP on 9/1/2021 to her c/o CP, diaphoresis, and SOB. His ECG on 9/1/2021 demonstrated NSR; RBBB (no change from 2013 study). Today, 9/15/2021, he reports starting in June 2021 he started having diaphoresis and SOB with any activity. He reports SOB with exertion and even at rest. These symptoms have been going on for about 6 months and are new. He also complains of more fatigue than usual. He states that he is retired and he used to work for [employer]. He states that he is taking his medications as prescribed and tolerates them well. Patient with no complaints of chest pain, palpitations, dizziness, edema, or orthopnea/PND. Past Medical History:   has a past medical history of Allergic rhinitis, BPH, Colitis, ED (erectile dysfunction), ED (erectile dysfunction), Gout involving toe of right foot, Hyperlipidemia, Hyperlipidemia with target LDL less than 100, Hypertension, Morbidly obese ([address] Type 2 diabetes mellitus without complication ([address] and Type II or unspecified type diabetes mellitus without mention of complication, not stated as uncontrolled. Surgical History:   has a past surgical history that includes shoulder surgery; back surgery; Tongue surgery; Colonoscopy (2007); and arthrography (Right, 9/29/2020). Social History:   reports that he quit smoking about 2002. His smoking use included cigarettes 1ppd prior. He has never used smokeless tobacco. He reports that he does not drink alcohol and does not use drugs.  He states that he quit smoking in 2002. He smoked 1 PPD at the time. He smoked for a total of 20 years. Rare alcohol consumption. He states that he is retired and he used to work for [employer]. Family History:  family history includes Cancer in his father, mother, sister, and sister; Diabetes in his sister; Other in his brother. Grown children. No known heart history in immediate family. Brother has AAA. Home Medications:  Prior to Admission medications    Medication Sig Start Date End Date Taking? Authorizing Provider   rosuvastatin (CRESTOR) 5 MG tablet Take 1 tablet by mouth nightly 9/1/21  Yes Allison Espinal MD   metFORMIN (GLUCOPHAGE) 1000 MG tablet Take 1 tablet by mouth 2 times daily (with meals) 9/1/21  Yes Allison Espinal MD   clindamycin (CLEOCIN) 300 MG capsule Take 600 mg by mouth See Admin Instructions Prior to dental procedures only   Yes Historical Provider, MD   allopurinol (ZYLOPRIM) 300 MG tablet Take 1 tablet by mouth daily 3/1/21  Yes Latasha Espinal MD   lisinopril (PRINIVIL;ZESTRIL) 20 MG tablet TAKE 1 TABLET BY MOUTH DAILY 2/22/21  Yes Allison Espinal MD   atenolol (TENORMIN) 50 MG tablet TAKE 1 TABLET BY MOUTH DAILY 2/22/21  Yes Latasha Espinal MD   ONE TOUCH ULTRA TEST strip TEST EVERY DAY 11/12/18  Yes Latasha Espinal MD   Compression Stockings MISC by Does not apply route 4/27/17  Yes Allison Espinal MD   aspirin 81 MG EC tablet Take 81 mg by mouth daily. Yes Historical Provider, MD   fluocinonide (LIDEX) 0.05 % ointment Apply topically daily Indications: 30 gm Apply topically 2 times daily. Patient not taking: Reported on 9/15/2021    Historical Provider, MD        Allergies:  Augmentin [amoxicillin-pot clavulanate], Bactrim [sulfamethoxazole-trimethoprim], and Simvastatin     Review of Systems:   · Constitutional: there has been no unanticipated weight loss. There's been no change in energy level, sleep pattern, or activity level. · Eyes: No visual changes or diplopia. No scleral icterus.   · ENT: No Headaches, hearing loss or vertigo. No mouth sores or sore throat. · Cardiovascular: Reviewed in HPI  · Respiratory: No cough or wheezing, no sputum production. No hematemesis. · Gastrointestinal: No abdominal pain, appetite loss, blood in stools. No change in bowel or bladder habits. · Genitourinary: No dysuria, trouble voiding, or hematuria. · Musculoskeletal:  No gait disturbance, weakness or joint complaints. · Integumentary: No rash or pruritis. · Neurological: No headache, diplopia, change in muscle strength, numbness or tingling. No change in gait, balance, coordination, mood, affect, memory, mentation, behavior. · Psychiatric: No anxiety, no depression. · Endocrine: No malaise, fatigue or temperature intolerance. No excessive thirst, fluid intake, or urination. No tremor. · Hematologic/Lymphatic: No abnormal bruising or bleeding, blood clots or swollen lymph nodes. · Allergic/Immunologic: No nasal congestion or hives. Physical Examination:    Vitals:    09/15/21 0947   BP: 122/80   Pulse: 69   SpO2: 99%        Constitutional and General Appearance: NAD   Respiratory:  · Normal excursion and expansion without use of accessory muscles  · Resp Auscultation: Normal breath sounds without dullness  Cardiovascular:  · The apical impulses not displaced  · Heart tones are crisp and normal  · Cervical veins are not engorged  · The carotid upstroke is normal in amplitude and contour without delay or bruit  · Normal S1S2, No S3, No Murmur  · Peripheral pulses are symmetrical and full  · There is no clubbing, cyanosis of the extremities.   · Trace edema BLE  · Femoral Arteries: 2+ and equal  · Pedal Pulses: 2+ and equal   Abdomen:  · + umbilical hernia noted  · Liver/Spleen: No Abnormalities Noted  Neurological/Psychiatric:  · Alert and oriented in all spheres  · Moves all extremities well  · Exhibits normal gait balance and coordination  · No abnormalities of mood, affect, memory, mentation, or behavior are noted  Skin:  · Skin: warm and dry. Lab Results   Component Value Date    CHOL 125 03/01/2021    CHOL 97 02/26/2020    CHOL 134 02/26/2019     Lab Results   Component Value Date    TRIG 141 03/01/2021    TRIG 107 02/26/2020    TRIG 232 (H) 02/26/2019     Lab Results   Component Value Date    HDL 46 03/01/2021    HDL 42 02/26/2020    HDL 48 02/26/2019     Lab Results   Component Value Date    LDLCHOLESTEROL 60 02/02/2015    LDLCHOLESTEROL 56 08/25/2014    LDLCALC 51 03/01/2021    LDLCALC 34 02/26/2020    LDLCALC 40 02/26/2019     Lab Results   Component Value Date    LABVLDL 28 03/01/2021    LABVLDL 21 02/26/2020    LABVLDL 46 02/26/2019     No results found for: CHOLHDLRATIO    Assessment:     1. RBBB: His ECG on 9/1/2021 demonstrated NSR; RBBB (no change from 2013 study). I reviewed multiple EKG's dating back to 2013 and RBBB has been consistently present. 2. HTN: Well controlled and will continue current medical regimen. 3. HLD: Note prior myalgias with zocor. Just started on crestor 5mg qd by PCP 1 week ago. I personally reviewed most recent lipids from 3/1/21 in Epic (see above). Well controlled and will continue current medical regimen. 4. RODRIGUEZ, diaphoresis, and fatigue: Concern for new symptoms which may be c/w CHF/cardiomyopathy vs ischemia (anginal equivalent) vs deconditioning/obesity vs combination. He has multiple CAD risk factors including age, prior tobacco abuse, obesity, HLD, and HTN. He merits non-invasive cardiac evaluation given new symptoms and RF's. Plan:  1. Echocardiogram for SOB. 2. Nuclear stress test for SOB and diaphoresis. 4 days before test- cut atenolol in half. 2 days prior to test- do not take atenolol at all. 3. Repeat abdominal US- family history AAA  4. Follow up in 3 months. Jyoti Phan RN, am scribing for and in the presence of Dr. Nicole Mix.  09/15/21 10:01 AM   Aaliyah Harrell RN    I, Dr. Nicole Mix, personally performed the services described in this documentation, as scribed by the above signed scribe in my presence. It is both accurate and complete to my knowledge. I agree with the details independently gathered by the clinical support staff, while the remaining scribed note accurately describes my personal service to the patient. Cost of prescription medications and patient compliance have been reviewed with patient. All questions answered. Thank you for allowing me to participate in the care of this individual.    [place].  Hai Hernandes M.D., [facility]

## 2021-09-27 ENCOUNTER — HOSPITAL ENCOUNTER (OUTPATIENT)
Dept: NUCLEAR MEDICINE | Age: 71
Discharge: HOME | End: 2021-09-27
Payer: MEDICARE

## 2021-09-27 ENCOUNTER — HOSPITAL ENCOUNTER (OUTPATIENT)
Dept: NON INVASIVE DIAGNOSTICS | Age: 71
Discharge: HOME | End: 2021-09-27
Payer: MEDICARE

## 2021-09-27 ENCOUNTER — HOSPITAL ENCOUNTER (OUTPATIENT)
Dept: ULTRASOUND IMAGING | Age: 71
Discharge: HOME | End: 2021-09-27
Payer: MEDICARE

## 2021-09-27 DIAGNOSIS — R06.02 SOB (SHORTNESS OF BREATH): ICD-10-CM

## 2021-09-27 LAB
LV EF: 58 %
LV EF: 69 %
LVEF MODALITY: NORMAL
LVEF MODALITY: NORMAL

## 2021-09-27 PROCEDURE — 76700 US EXAM ABDOM COMPLETE: CPT

## 2021-09-27 PROCEDURE — 6360000004 HC RX CONTRAST MEDICATION: Performed by: INTERNAL MEDICINE

## 2021-09-27 PROCEDURE — 3430000000 HC RX DIAGNOSTIC RADIOPHARMACEUTICAL: Performed by: INTERNAL MEDICINE

## 2021-09-27 PROCEDURE — C8929 TTE W OR WO FOL WCON,DOPPLER: HCPCS

## 2021-09-27 PROCEDURE — 93017 CV STRESS TEST TRACING ONLY: CPT

## 2021-09-27 PROCEDURE — 78452 HT MUSCLE IMAGE SPECT MULT: CPT

## 2021-09-27 PROCEDURE — A9502 TC99M TETROFOSMIN: HCPCS | Performed by: INTERNAL MEDICINE

## 2021-09-27 RX ADMIN — PERFLUTREN 2.2 MG: 6.52 INJECTION, SUSPENSION INTRAVENOUS at 12:11

## 2021-09-27 RX ADMIN — TETROFOSMIN 11 MILLICURIE: 1.38 INJECTION, POWDER, LYOPHILIZED, FOR SOLUTION INTRAVENOUS at 08:55

## 2021-09-27 RX ADMIN — TETROFOSMIN 33.5 MILLICURIE: 1.38 INJECTION, POWDER, LYOPHILIZED, FOR SOLUTION INTRAVENOUS at 09:53

## 2021-10-27 ENCOUNTER — VIRTUAL VISIT (OUTPATIENT)
Dept: INTERNAL MEDICINE CLINIC | Age: 71
End: 2021-10-27
Payer: MEDICARE

## 2021-10-27 ENCOUNTER — HOSPITAL ENCOUNTER (OUTPATIENT)
Age: 71
Discharge: HOME | End: 2021-10-27
Payer: MEDICARE

## 2021-10-27 ENCOUNTER — PATIENT MESSAGE (OUTPATIENT)
Dept: INTERNAL MEDICINE CLINIC | Age: 71
End: 2021-10-27

## 2021-10-27 ENCOUNTER — HOSPITAL ENCOUNTER (OUTPATIENT)
Dept: GENERAL RADIOLOGY | Age: 71
Discharge: HOME | End: 2021-10-27
Payer: MEDICARE

## 2021-10-27 DIAGNOSIS — J06.9 URI WITH COUGH AND CONGESTION: ICD-10-CM

## 2021-10-27 DIAGNOSIS — R06.02 SHORTNESS OF BREATH: Primary | ICD-10-CM

## 2021-10-27 DIAGNOSIS — R05.3 CHRONIC COUGH: Primary | ICD-10-CM

## 2021-10-27 DIAGNOSIS — R05.3 CHRONIC COUGH: ICD-10-CM

## 2021-10-27 PROCEDURE — 71046 X-RAY EXAM CHEST 2 VIEWS: CPT

## 2021-10-27 PROCEDURE — 4040F PNEUMOC VAC/ADMIN/RCVD: CPT | Performed by: INTERNAL MEDICINE

## 2021-10-27 PROCEDURE — G8427 DOCREV CUR MEDS BY ELIG CLIN: HCPCS | Performed by: INTERNAL MEDICINE

## 2021-10-27 PROCEDURE — 99212 OFFICE O/P EST SF 10 MIN: CPT | Performed by: INTERNAL MEDICINE

## 2021-10-27 PROCEDURE — 3017F COLORECTAL CA SCREEN DOC REV: CPT | Performed by: INTERNAL MEDICINE

## 2021-10-27 PROCEDURE — 1123F ACP DISCUSS/DSCN MKR DOCD: CPT | Performed by: INTERNAL MEDICINE

## 2021-10-27 RX ORDER — AZITHROMYCIN 250 MG/1
250 TABLET, FILM COATED ORAL SEE ADMIN INSTRUCTIONS
Qty: 6 TABLET | Refills: 0 | Status: SHIPPED | OUTPATIENT
Start: 2021-10-27 | End: 2021-11-01

## 2021-10-27 NOTE — PROGRESS NOTES
Pursuant to the emergency declaration under the [facility] authority and the [employer] Act, this Virtual  Video Visit was conducted, with patient's consent, to reduce the patient's risk of exposure to COVID-19 and provide continuity of care. Service is  provided through a video synchronous discussion virtually to substitute for in-person clinic visit with the patient being at home and Dr. Aj Vizcarra being at home. Patient consent to the video visit. Date of Service:  10/27/2021    Chief Complaint:      Chief Complaint   Patient presents with    Referral - General    Shortness of Breath       Assessment/Plan:    Leonardo Mayorga was seen today for referral - general and shortness of breath. Diagnoses and all orders for this visit:    Chronic cough  -     XR CHEST (2 VW); Future    URI with cough and congestion  -     azithromycin (ZITHROMAX) 250 MG tablet; Take 1 tablet by mouth See Admin Instructions for 5 days 500mg on day 1 followed by 250mg on days 2 - 5      Return if symptoms worsen or fail to improve. HPI:  Shira Lowe is a 70 y.o. He complain of persistent coughing up green phlegm for couple of months. He also has sweating and shortness of breath when he's exerting himself. He has Symbicort that's  in 2017 which has helped in the past but not been using it. He denies fever or chills.     Lab Results   Component Value Date    LABA1C 6.6 2021    LABA1C 6.0 2021    LABA1C 6.3 2020    LABMICR <1.20 2021     Lab Results   Component Value Date     2021    K 4.8 2021     2021    CO2 27 2021    BUN 14 2021    CREATININE 1.1 2021    GLUCOSE 139 (H) 2021    CALCIUM 9.9 2021     Lab Results   Component Value Date    CHOL 125 2021    TRIG 141 2021    HDL 46 2021    HDL 44 2012    LDLCALC 51 03/01/2021     Lab Results   Component Value Date    ALT 14 03/01/2021    AST 17 03/01/2021     Lab Results   Component Value Date    TSH 2.01 08/23/2011    TSH 1.78 03/24/2010     Lab Results   Component Value Date    WBC 7.0 03/01/2021    HGB 12.3 (L) 03/01/2021    HCT 37.2 (L) 03/01/2021    MCV 95.7 03/01/2021     03/01/2021     Lab Results   Component Value Date    INR 1.04 12/28/2020      Lab Results   Component Value Date    PSA 0.45 02/22/2016    PSA 0.47 02/02/2015    PSA 0.52 02/26/2014      Lab Results   Component Value Date    LABURIC 5.1 03/01/2021        Patient Active Problem List   Diagnosis    Essential hypertension    Controlled type 2 diabetes mellitus without complication, without long-term current use of insulin ([facility] [address]    ED (erectile dysfunction)    Hypogonadism male    Lipodermatosclerosis    Mixed hyperlipidemia    Morbidly obese (HCC)    Chronic gout of left foot    History of tobacco abuse       Allergies   Allergen Reactions    Augmentin [Amoxicillin-Pot Clavulanate]      Stomach pain with n/v    Bactrim [Sulfamethoxazole-Trimethoprim]      SOB and diaphorectic    Simvastatin      Myalgia    Sulfa Antibiotics      GI upset     Outpatient Medications Marked as Taking for the 10/27/21 encounter (Virtual Visit) with Kriss Espinal MD   Medication Sig Dispense Refill    rosuvastatin (CRESTOR) 5 MG tablet Take 1 tablet by mouth nightly 90 tablet 1    metFORMIN (GLUCOPHAGE) 1000 MG tablet Take 1 tablet by mouth 2 times daily (with meals) 180 tablet 3    clindamycin (CLEOCIN) 300 MG capsule Take 600 mg by mouth See Admin Instructions Prior to dental procedures only      allopurinol (ZYLOPRIM) 300 MG tablet Take 1 tablet by mouth daily 90 tablet 3    lisinopril (PRINIVIL;ZESTRIL) 20 MG tablet TAKE 1 TABLET BY MOUTH DAILY 90 tablet 3    atenolol (TENORMIN) 50 MG tablet TAKE 1 TABLET BY MOUTH DAILY 90 tablet 3    ONE TOUCH ULTRA TEST strip TEST EVERY  strip 0    fluocinonide (LIDEX) 0.05 % ointment Apply topically daily Indications: 30 gm Apply topically 2 times daily.  Compression Stockings MISC by Does not apply route 1 each 1    aspirin 81 MG EC tablet Take 81 mg by mouth daily. Review of Systems: 14 systems were negative except of what was stated on HPI    Nursing note and vitals reviewed. There were no vitals filed for this visit. Wt Readings from Last 3 Encounters:   09/15/21 243 lb (110.2 kg)   09/01/21 239 lb (108.4 kg)   03/01/21 232 lb (105.2 kg)     BP Readings from Last 3 Encounters:   09/15/21 122/80   09/01/21 126/62   03/01/21 112/62     There is no height or weight on file to calculate BMI. Constitutional: Patient appears well-developed and well-nourished. No distress. Head: Normocephalic and atraumatic. Skin: No rash or erythema. Psychiatric: Normal mood and affect.  Behavior is normal.

## 2021-10-27 NOTE — TELEPHONE ENCOUNTER
From: Gabby Parekh  To: Misa Nowak MD  Sent: 10/27/2021 10:15 AM EDT  Subject: Non-Urgent Medical Question    I seen Dr Chacho Farias, all appears okay with him. Dr Chacho Farias recommends I see a Lung Specialist, Pulmonologist, such as Dr Ranulfo Browning or Dr Nerissa Samuel. Can you do me a referrel please?   Thanks in advance,  Kenneth

## 2022-02-20 DIAGNOSIS — E78.2 MIXED HYPERLIPIDEMIA: ICD-10-CM

## 2022-02-21 RX ORDER — ROSUVASTATIN CALCIUM 5 MG/1
TABLET, COATED ORAL
Qty: 90 TABLET | Refills: 1 | OUTPATIENT
Start: 2022-02-21

## 2022-02-24 SDOH — HEALTH STABILITY: PHYSICAL HEALTH: ON AVERAGE, HOW MANY MINUTES DO YOU ENGAGE IN EXERCISE AT THIS LEVEL?: 30 MIN

## 2022-02-24 SDOH — HEALTH STABILITY: PHYSICAL HEALTH: ON AVERAGE, HOW MANY DAYS PER WEEK DO YOU ENGAGE IN MODERATE TO STRENUOUS EXERCISE (LIKE A BRISK WALK)?: 2 DAYS

## 2022-02-24 ASSESSMENT — PATIENT HEALTH QUESTIONNAIRE - PHQ9
2. FEELING DOWN, DEPRESSED OR HOPELESS: 0
1. LITTLE INTEREST OR PLEASURE IN DOING THINGS: 0
SUM OF ALL RESPONSES TO PHQ9 QUESTIONS 1 & 2: 0
SUM OF ALL RESPONSES TO PHQ QUESTIONS 1-9: 0

## 2022-02-24 ASSESSMENT — LIFESTYLE VARIABLES
HOW OFTEN DO YOU HAVE SIX OR MORE DRINKS ON ONE OCCASION: 1
HOW OFTEN DO YOU HAVE A DRINK CONTAINING ALCOHOL: NEVER
HOW OFTEN DO YOU HAVE A DRINK CONTAINING ALCOHOL: 1
HOW MANY STANDARD DRINKS CONTAINING ALCOHOL DO YOU HAVE ON A TYPICAL DAY: 1
HOW MANY STANDARD DRINKS CONTAINING ALCOHOL DO YOU HAVE ON A TYPICAL DAY: 1 OR 2

## 2022-03-02 ENCOUNTER — OFFICE VISIT (OUTPATIENT)
Dept: INTERNAL MEDICINE CLINIC | Age: 72
End: 2022-03-02
Payer: MEDICARE

## 2022-03-02 VITALS
HEIGHT: 68 IN | DIASTOLIC BLOOD PRESSURE: 58 MMHG | HEART RATE: 85 BPM | BODY MASS INDEX: 35.46 KG/M2 | WEIGHT: 234 LBS | OXYGEN SATURATION: 98 % | SYSTOLIC BLOOD PRESSURE: 122 MMHG

## 2022-03-02 DIAGNOSIS — E66.01 SEVERE OBESITY (BMI 35.0-39.9) WITH COMORBIDITY (HCC): ICD-10-CM

## 2022-03-02 DIAGNOSIS — I10 ESSENTIAL HYPERTENSION: ICD-10-CM

## 2022-03-02 DIAGNOSIS — E11.9 CONTROLLED TYPE 2 DIABETES MELLITUS WITHOUT COMPLICATION, WITHOUT LONG-TERM CURRENT USE OF INSULIN (HCC): ICD-10-CM

## 2022-03-02 DIAGNOSIS — Z00.00 MEDICARE ANNUAL WELLNESS VISIT, SUBSEQUENT: Primary | ICD-10-CM

## 2022-03-02 DIAGNOSIS — E78.2 MIXED HYPERLIPIDEMIA: ICD-10-CM

## 2022-03-02 DIAGNOSIS — M1A.0720 CHRONIC GOUT OF LEFT FOOT, UNSPECIFIED CAUSE: ICD-10-CM

## 2022-03-02 LAB
A/G RATIO: 2.3 (ref 1.1–2.2)
ALBUMIN SERPL-MCNC: 5 G/DL (ref 3.4–5)
ALP BLD-CCNC: 63 U/L (ref 40–129)
ALT SERPL-CCNC: 27 U/L (ref 10–40)
ANION GAP SERPL CALCULATED.3IONS-SCNC: 17 MMOL/L (ref 3–16)
AST SERPL-CCNC: 27 U/L (ref 15–37)
BASOPHILS ABSOLUTE: 0 K/UL (ref 0–0.2)
BASOPHILS RELATIVE PERCENT: 0.5 %
BILIRUB SERPL-MCNC: 0.8 MG/DL (ref 0–1)
BUN BLDV-MCNC: 19 MG/DL (ref 7–20)
CALCIUM SERPL-MCNC: 10 MG/DL (ref 8.3–10.6)
CHLORIDE BLD-SCNC: 99 MMOL/L (ref 99–110)
CHOLESTEROL, TOTAL: 124 MG/DL (ref 0–199)
CO2: 23 MMOL/L (ref 21–32)
CREAT SERPL-MCNC: 1.1 MG/DL (ref 0.8–1.3)
CREATININE URINE: 95.7 MG/DL (ref 39–259)
EOSINOPHILS ABSOLUTE: 0.2 K/UL (ref 0–0.6)
EOSINOPHILS RELATIVE PERCENT: 2.4 %
GFR AFRICAN AMERICAN: >60
GFR NON-AFRICAN AMERICAN: >60
GLUCOSE BLD-MCNC: 167 MG/DL (ref 70–99)
HBA1C MFR BLD: 7.2 %
HCT VFR BLD CALC: 43.4 % (ref 40.5–52.5)
HDLC SERPL-MCNC: 50 MG/DL (ref 40–60)
HEMOGLOBIN: 14.3 G/DL (ref 13.5–17.5)
LDL CHOLESTEROL CALCULATED: 34 MG/DL
LYMPHOCYTES ABSOLUTE: 1.6 K/UL (ref 1–5.1)
LYMPHOCYTES RELATIVE PERCENT: 23.6 %
MCH RBC QN AUTO: 32 PG (ref 26–34)
MCHC RBC AUTO-ENTMCNC: 32.9 G/DL (ref 31–36)
MCV RBC AUTO: 97.2 FL (ref 80–100)
MICROALBUMIN UR-MCNC: <1.2 MG/DL
MICROALBUMIN/CREAT UR-RTO: NORMAL MG/G (ref 0–30)
MONOCYTES ABSOLUTE: 0.5 K/UL (ref 0–1.3)
MONOCYTES RELATIVE PERCENT: 7.5 %
NEUTROPHILS ABSOLUTE: 4.6 K/UL (ref 1.7–7.7)
NEUTROPHILS RELATIVE PERCENT: 66 %
PDW BLD-RTO: 14.1 % (ref 12.4–15.4)
PLATELET # BLD: 179 K/UL (ref 135–450)
PMV BLD AUTO: 9.8 FL (ref 5–10.5)
POTASSIUM SERPL-SCNC: 5.5 MMOL/L (ref 3.5–5.1)
RBC # BLD: 4.46 M/UL (ref 4.2–5.9)
SODIUM BLD-SCNC: 139 MMOL/L (ref 136–145)
TOTAL PROTEIN: 7.2 G/DL (ref 6.4–8.2)
TRIGL SERPL-MCNC: 202 MG/DL (ref 0–150)
VLDLC SERPL CALC-MCNC: 40 MG/DL
WBC # BLD: 6.9 K/UL (ref 4–11)

## 2022-03-02 PROCEDURE — 3051F HG A1C>EQUAL 7.0%<8.0%: CPT | Performed by: INTERNAL MEDICINE

## 2022-03-02 PROCEDURE — G8482 FLU IMMUNIZE ORDER/ADMIN: HCPCS | Performed by: INTERNAL MEDICINE

## 2022-03-02 PROCEDURE — 1036F TOBACCO NON-USER: CPT | Performed by: INTERNAL MEDICINE

## 2022-03-02 PROCEDURE — 99214 OFFICE O/P EST MOD 30 MIN: CPT | Performed by: INTERNAL MEDICINE

## 2022-03-02 PROCEDURE — G0439 PPPS, SUBSEQ VISIT: HCPCS | Performed by: INTERNAL MEDICINE

## 2022-03-02 PROCEDURE — 3017F COLORECTAL CA SCREEN DOC REV: CPT | Performed by: INTERNAL MEDICINE

## 2022-03-02 PROCEDURE — G8427 DOCREV CUR MEDS BY ELIG CLIN: HCPCS | Performed by: INTERNAL MEDICINE

## 2022-03-02 PROCEDURE — G8417 CALC BMI ABV UP PARAM F/U: HCPCS | Performed by: INTERNAL MEDICINE

## 2022-03-02 PROCEDURE — 2022F DILAT RTA XM EVC RTNOPTHY: CPT | Performed by: INTERNAL MEDICINE

## 2022-03-02 PROCEDURE — 4040F PNEUMOC VAC/ADMIN/RCVD: CPT | Performed by: INTERNAL MEDICINE

## 2022-03-02 PROCEDURE — 36415 COLL VENOUS BLD VENIPUNCTURE: CPT | Performed by: INTERNAL MEDICINE

## 2022-03-02 PROCEDURE — 1123F ACP DISCUSS/DSCN MKR DOCD: CPT | Performed by: INTERNAL MEDICINE

## 2022-03-02 PROCEDURE — 83036 HEMOGLOBIN GLYCOSYLATED A1C: CPT | Performed by: INTERNAL MEDICINE

## 2022-03-02 RX ORDER — LISINOPRIL 20 MG/1
20 TABLET ORAL DAILY
Qty: 90 TABLET | Refills: 3 | Status: SHIPPED | OUTPATIENT
Start: 2022-03-02

## 2022-03-02 RX ORDER — ROSUVASTATIN CALCIUM 5 MG/1
5 TABLET, COATED ORAL NIGHTLY
Qty: 90 TABLET | Refills: 3 | Status: SHIPPED | OUTPATIENT
Start: 2022-03-02

## 2022-03-02 RX ORDER — ATENOLOL 50 MG/1
50 TABLET ORAL DAILY
Qty: 90 TABLET | Refills: 3 | Status: SHIPPED | OUTPATIENT
Start: 2022-03-02

## 2022-03-02 RX ORDER — ALLOPURINOL 300 MG/1
300 TABLET ORAL DAILY
Qty: 90 TABLET | Refills: 3 | Status: SHIPPED | OUTPATIENT
Start: 2022-03-02

## 2022-03-02 NOTE — PROGRESS NOTES
Giselle Parekh  YOB: 1950    Date of Service:  3/2/2022    Chief Complaint:      Chief Complaint   Patient presents with    Medicare AWV    Hypertension    Diabetes    Hyperlipidemia       Assessment/Plan:  Skyla Jovel was seen today for medicare awv, hypertension, diabetes and hyperlipidemia. Diagnoses and all orders for this visit:    Medicare annual wellness visit, subsequent  -     Full code    Controlled type 2 diabetes mellitus without complication, without long-term current use of insulin (HCC)  -     POCT glycosylated hemoglobin (Hb A1C)  -     Microalbumin / Creatinine Urine Ratio    Essential hypertension  -     lisinopril (PRINIVIL;ZESTRIL) 20 MG tablet; Take 1 tablet by mouth daily  Decrease atenolol (TENORMIN) 50 MG tablet; Take 1/2  tablet by mouth evening  to see if dizziness improves  -     Comprehensive Metabolic Panel  -     CBC with Auto Differential    Mixed hyperlipidemia  -     rosuvastatin (CRESTOR) 5 MG tablet; Take 1 tablet by mouth nightly  -     Lipid Panel    Chronic gout of left foot, unspecified cause  -     allopurinol (ZYLOPRIM) 300 MG tablet; Take 1 tablet by mouth daily    Severe obesity (BMI 35.0-39. 9) with comorbidity ([address]    Goals:   -Eat small amounts of food 4-5 times daily  -Avoid high fat and high sugar foods  -Include protein with all meals and snacks  -Avoid carbonation and caffeine  -Avoid calorie containing beverages  -Increase physical activity as tolerated      Return 6 months on diabetes, BP, cholesterol. HPI:  Sanchez Aguirre is a 70 y.o. He complains of having intermittent dizziness when he gets up in the AM for the past couple of months. Treatment Adherence:   Medication compliance: compliant all of the time   Diet compliance: compliant most of the time   Weight trend: stable   Current exercise: remodeling house daily, walk 2-3x/week   Barriers: none   Diabetes Mellitus Type 2: Current symptoms/problems include none.  Metformin 1 g bid.   Home blood sugar records: fasting range: not testing  Any episodes of hypoglycemia? no   Eye exam current (within one year): yes   Tobacco history: He reports that he has never smoked. He has never used smokeless tobacco.   Daily Aspirin? Yes   Known diabetic complications: none   Hypertension: Home blood pressure monitoring: Yes - 110/61-84 on Atenolol 50 mg qAM and Lisinopril 20 mg qAM. He is adherent to a low sodium diet. Patient denies chest pain, shortness of breath, headache, lightheadedness, blurred vision, peripheral edema, palpitations, dry cough and fatigue. Antihypertensive medication side effects: no medication side effects noted. Use of agents associated with hypertension: none. Hyperlipidemia: no new myalgia or weakness on Crestor 5 mg daily. Myalgia resolve off simvastatin (Zocor) 20 mg qhs. Trying to be on a low fat diet. Gout in left toe and ankle: resolve since been on Allopurinol 300 mg qd. Hypogonadism: he got off the Androgel due to nipple tenderness and he has not felt more tired.    ED: unchanged. able to have an erection a little easier but not much of a difference when he was on testosterone replacement.     Lab Results   Component Value Date    LABA1C 6.6 09/01/2021    LABA1C 6.0 03/01/2021    LABA1C 6.3 08/26/2020    LABMICR <1.20 03/01/2021     Lab Results   Component Value Date     03/01/2021    K 4.8 03/01/2021     03/01/2021    CO2 27 03/01/2021    BUN 14 03/01/2021    CREATININE 1.1 03/01/2021    GLUCOSE 139 (H) 03/01/2021    CALCIUM 9.9 03/01/2021     Lab Results   Component Value Date    CHOL 125 03/01/2021    TRIG 141 03/01/2021    HDL 46 03/01/2021    HDL 44 05/08/2012    LDLCALC 51 03/01/2021     Lab Results   Component Value Date    ALT 14 03/01/2021    AST 17 03/01/2021     Lab Results   Component Value Date    TSH 2.01 08/23/2011    TSH 1.78 03/24/2010     Lab Results   Component Value Date    WBC 7.0 03/01/2021    HGB 12.3 (L) 03/01/2021    HCT 37.2 (L) 03/01/2021    MCV 95.7 03/01/2021     03/01/2021     Lab Results   Component Value Date    INR 1.04 12/28/2020      Lab Results   Component Value Date    PSA 0.45 02/22/2016    PSA 0.47 02/02/2015    PSA 0.52 02/26/2014      Lab Results   Component Value Date    LABURIC 5.1 03/01/2021        Patient Active Problem List   Diagnosis    Essential hypertension    Controlled type 2 diabetes mellitus without complication, without long-term current use of insulin ([facility] [address]    ED (erectile dysfunction)    Hypogonadism male    Lipodermatosclerosis    Mixed hyperlipidemia    Morbidly obese (HCC)    Chronic gout of left foot    History of tobacco abuse       Allergies   Allergen Reactions    Augmentin [Amoxicillin-Pot Clavulanate]      Stomach pain with n/v    Bactrim [Sulfamethoxazole-Trimethoprim]      SOB and diaphorectic    Simvastatin      Myalgia    Sulfa Antibiotics      GI upset     Outpatient Medications Marked as Taking for the 3/2/22 encounter (Office Visit) with Rasheed Espinal MD   Medication Sig Dispense Refill    lisinopril (PRINIVIL;ZESTRIL) 20 MG tablet TAKE 1 TABLET BY MOUTH DAILY 90 tablet 0    atenolol (TENORMIN) 50 MG tablet TAKE 1 TABLET BY MOUTH DAILY 90 tablet 0    rosuvastatin (CRESTOR) 5 MG tablet Take 1 tablet by mouth nightly 90 tablet 1    metFORMIN (GLUCOPHAGE) 1000 MG tablet Take 1 tablet by mouth 2 times daily (with meals) 180 tablet 3    clindamycin (CLEOCIN) 300 MG capsule Take 600 mg by mouth See Admin Instructions Prior to dental procedures only      allopurinol (ZYLOPRIM) 300 MG tablet Take 1 tablet by mouth daily 90 tablet 3    ONE TOUCH ULTRA TEST strip TEST EVERY  strip 0    fluocinonide (LIDEX) 0.05 % ointment Apply topically daily Indications: 30 gm Apply topically 2 times daily.  Compression Stockings MISC by Does not apply route 1 each 1    aspirin 81 MG EC tablet Take 81 mg by mouth daily.            Review of Systems: 14 systems were negative except of what was stated on HPI    Nursing note and vitals reviewed. Vitals:    03/02/22 1005   BP: (!) 122/58   Pulse: 85   SpO2: 98%   Weight: 234 lb (106.1 kg)   Height: 5' 8\" (1.727 m)     Wt Readings from Last 3 Encounters:   03/02/22 234 lb (106.1 kg)   09/15/21 243 lb (110.2 kg)   09/01/21 239 lb (108.4 kg)     BP Readings from Last 3 Encounters:   03/02/22 (!) 122/58   09/15/21 122/80   09/01/21 126/62     Body mass index is 35.58 kg/m². Constitutional: Patient appears well-developed and well-nourished. No distress. Head: Normocephalic and atraumatic. Neck: Normal range of motion. Neck supple. No thyroidmegaly. Cardiovascular: Normal rate, regular rhythm, normal heart sounds and intact distal pulses. Pulmonary/Chest: Effort normal and breath sounds normal. No stridor. No respiratory distress. No wheezes and no rales. Abdominal: Soft. Bowel sounds are normal. No distension and no mass. No tenderness. No rebound and no guarding. Musculoskeletal: No edema and no tenderness. Skin: No rash or erythema.

## 2022-03-02 NOTE — PATIENT INSTRUCTIONS
Personalized Preventive Plan for Kori Parekh - 3/2/2022  Medicare offers a range of preventive health benefits. Some of the tests and screenings are paid in full while other may be subject to a deductible, co-insurance, and/or copay. Some of these benefits include a comprehensive review of your medical history including lifestyle, illnesses that may run in your family, and various assessments and screenings as appropriate. After reviewing your medical record and screening and assessments performed today your provider may have ordered immunizations, labs, imaging, and/or referrals for you. A list of these orders (if applicable) as well as your Preventive Care list are included within your After Visit Summary for your review. Other Preventive Recommendations:    · A preventive eye exam performed by an eye specialist is recommended every 1-2 years to screen for glaucoma; cataracts, macular degeneration, and other eye disorders. · A preventive dental visit is recommended every 6 months. · Try to get at least 150 minutes of exercise per week or 10,000 steps per day on a pedometer . · Order or download the FREE \"Exercise & Physical Activity: Your Everyday Guide\" from [employer]. Call 8-204.687.8901 or search [employer] online. · You need 2417-9443 mg of calcium and 3061-6054 IU of vitamin D per day. It is possible to meet your calcium requirement with diet alone, but a vitamin D supplement is usually necessary to meet this goal.  · When exposed to the sun, use a sunscreen that protects against both UVA and UVB radiation with an SPF of 30 or greater. Reapply every 2 to 3 hours or after sweating, drying off with a towel, or swimming. · Always wear a seat belt when traveling in a car. Always wear a helmet when riding a bicycle or motorcycle.

## 2022-03-02 NOTE — PROGRESS NOTES
Exercise per Session: 30 min     Have you lost any weight without trying in the past 3 months?: No  Body mass index: (!) 35.58  Have you seen the dentist within the past year?: Yes    Health Habits/Nutrition Interventions:  ·              Objective   Vitals:    03/02/22 1005   BP: (!) 122/58   Pulse: 85   SpO2: 98%   Weight: 234 lb (106.1 kg)   Height: 5' 8\" (1.727 m)      Body mass index is 35.58 kg/m². Allergies   Allergen Reactions    Augmentin [Amoxicillin-Pot Clavulanate]      Stomach pain with n/v    Bactrim [Sulfamethoxazole-Trimethoprim]      SOB and diaphorectic    Simvastatin      Myalgia    Sulfa Antibiotics      GI upset     Prior to Visit Medications    Medication Sig Taking? Authorizing Provider   lisinopril (PRINIVIL;ZESTRIL) 20 MG tablet TAKE 1 TABLET BY MOUTH DAILY Yes Allison Espinal MD   atenolol (TENORMIN) 50 MG tablet TAKE 1 TABLET BY MOUTH DAILY Yes Allison Espinal MD   rosuvastatin (CRESTOR) 5 MG tablet Take 1 tablet by mouth nightly Yes Allison Espinal MD   metFORMIN (GLUCOPHAGE) 1000 MG tablet Take 1 tablet by mouth 2 times daily (with meals) Yes Allison Espinal MD   clindamycin (CLEOCIN) 300 MG capsule Take 600 mg by mouth See Admin Instructions Prior to dental procedures only Yes Historical Provider, MD   allopurinol (ZYLOPRIM) 300 MG tablet Take 1 tablet by mouth daily Yes Allison Espinal MD   ONE TOUCH ULTRA TEST strip TEST EVERY DAY Yes Allison Espinal MD   fluocinonide (LIDEX) 0.05 % ointment Apply topically daily Indications: 30 gm Apply topically 2 times daily. Yes Historical Provider, MD   Compression Stockings MISC by Does not apply route Yes Allison Espinal MD   aspirin 81 MG EC tablet Take 81 mg by mouth daily.  Yes Historical Provider, MD [place] (Including outside providers/suppliers regularly involved in providing care):   Patient Care Team:  Damir Rowley MD as PCP - General (Internal Medicine)  Damir Rowley MD as PCP [facility] Empaneled Provider    Reviewed and updated this visit:  Tobacco  Allergies  Meds  Problems  Med Hx  Surg Hx  Soc Hx  Fam Hx

## 2022-05-22 DIAGNOSIS — E11.9 CONTROLLED TYPE 2 DIABETES MELLITUS WITHOUT COMPLICATION, WITHOUT LONG-TERM CURRENT USE OF INSULIN (HCC): ICD-10-CM

## 2022-08-01 ENCOUNTER — OFFICE VISIT (OUTPATIENT)
Dept: INTERNAL MEDICINE CLINIC | Age: 72
End: 2022-08-01
Payer: MEDICARE

## 2022-08-01 VITALS
WEIGHT: 236 LBS | HEIGHT: 68 IN | HEART RATE: 78 BPM | BODY MASS INDEX: 35.77 KG/M2 | DIASTOLIC BLOOD PRESSURE: 62 MMHG | OXYGEN SATURATION: 94 % | SYSTOLIC BLOOD PRESSURE: 118 MMHG

## 2022-08-01 DIAGNOSIS — E78.2 MIXED HYPERLIPIDEMIA: ICD-10-CM

## 2022-08-01 DIAGNOSIS — I10 ESSENTIAL HYPERTENSION: ICD-10-CM

## 2022-08-01 DIAGNOSIS — E29.1 HYPOGONADISM MALE: ICD-10-CM

## 2022-08-01 DIAGNOSIS — R25.2 BILATERAL LEG CRAMPS: ICD-10-CM

## 2022-08-01 DIAGNOSIS — E11.9 CONTROLLED TYPE 2 DIABETES MELLITUS WITHOUT COMPLICATION, WITHOUT LONG-TERM CURRENT USE OF INSULIN (HCC): Primary | ICD-10-CM

## 2022-08-01 DIAGNOSIS — E66.01 MORBIDLY OBESE (HCC): ICD-10-CM

## 2022-08-01 DIAGNOSIS — M1A.0720 CHRONIC GOUT OF LEFT FOOT, UNSPECIFIED CAUSE: ICD-10-CM

## 2022-08-01 LAB — HBA1C MFR BLD: 7.3 %

## 2022-08-01 PROCEDURE — 1123F ACP DISCUSS/DSCN MKR DOCD: CPT | Performed by: INTERNAL MEDICINE

## 2022-08-01 PROCEDURE — 99214 OFFICE O/P EST MOD 30 MIN: CPT | Performed by: INTERNAL MEDICINE

## 2022-08-01 PROCEDURE — 83036 HEMOGLOBIN GLYCOSYLATED A1C: CPT | Performed by: INTERNAL MEDICINE

## 2022-08-01 PROCEDURE — 3051F HG A1C>EQUAL 7.0%<8.0%: CPT | Performed by: INTERNAL MEDICINE

## 2022-08-01 RX ORDER — DULAGLUTIDE 0.75 MG/.5ML
0.75 INJECTION, SOLUTION SUBCUTANEOUS WEEKLY
Qty: 4 PEN | Refills: 0 | Status: SHIPPED | OUTPATIENT
Start: 2022-08-01 | End: 2022-08-30 | Stop reason: DRUGHIGH

## 2022-08-01 NOTE — PROGRESS NOTES
Maximiano Island Meder  YOB: 1950    Date of Service:  8/1/2022    Chief Complaint:      Chief Complaint   Patient presents with    Diabetes    Hypertension    Hyperlipidemia       Assessment/Plan:  Theodore Michael was seen today for diabetes, hypertension and hyperlipidemia. Diagnoses and all orders for this visit:    Controlled type 2 diabetes mellitus without complication, without long-term current use of insulin (HCC)  -     POCT glycosylated hemoglobin (Hb A1C)  Start Dulaglutide (TRULICITY) 5.27 VT/4.9LG SOPN; Inject 0.75 mg into the skin once a week    Mixed hyperlipidemia with bilateral leg cramps  Discontinue crestor 5 mg daily to see if leg cramps improve    Essential hypertension  Stable and continue on current treatment    Chronic gout of left foot, unspecified cause  Stable and continue on current treatment    Hypogonadism male  Stable and continue on current medications. Morbidly obese ([address]  Goals:   -Eat small amounts of food 4-5 times daily  -Avoid high fat and high sugar foods  -Include protein with all meals and snacks  -Avoid carbonation and caffeine  -Avoid calorie containing beverages  -Increase physical activity as tolerated    Return VV Aug 30 at 00:38 Diabetes on Trulicity and leg cramp off crestor. HPI:  Alexis De Leon is a 67 y.o. He complain of bilateral leg cramps daily despite hydration since May. Treatment Adherence:  Medication compliance: compliant all of the time  Diet compliance: compliant most of the time  Weight trend: stable  Current exercise: remodeling house daily, walk 2-3x/week  Barriers: none  Diabetes Mellitus Type 2: Current symptoms/problems include none. Metformin 1 g bid. Home blood sugar records: fasting range: 137-167  Any episodes of hypoglycemia? no  Eye exam current (within one year): yes  Tobacco history: He reports that he has never smoked. He has never used smokeless tobacco.  Daily Aspirin?  Yes  Known diabetic complications: none  Hypertension: Home blood pressure monitoring: Yes - 110/61-84 on Atenolol 50 mg qAM and Lisinopril 20 mg qAM. He is adherent to a low sodium diet. Patient denies chest pain, shortness of breath, headache, lightheadedness, blurred vision, peripheral edema, palpitations, dry cough and fatigue. Antihypertensive medication side effects: no medication side effects noted. Use of agents associated with hypertension: none. Hyperlipidemia: no new myalgia or weakness on Crestor 5 mg daily. Myalgia resolve off simvastatin (Zocor) 20 mg qhs. Trying to be on a low fat diet. Gout in left toe and ankle: resolve since been on Allopurinol 300 mg qd. Hypogonadism: he got off the Androgel due to nipple tenderness and he has not felt more tired. ED: unchanged. able to have an erection a little easier but not much of a difference when he was on testosterone replacement.         Lab Results   Component Value Date    LABA1C 7.3 08/01/2022    LABA1C 7.2 03/02/2022    LABA1C 6.6 09/01/2021    LABMICR <1.20 03/02/2022     Lab Results   Component Value Date     03/02/2022    K 5.5 (H) 03/02/2022    CL 99 03/02/2022    CO2 23 03/02/2022    BUN 19 03/02/2022    CREATININE 1.1 03/02/2022    GLUCOSE 167 (H) 03/02/2022    CALCIUM 10.0 03/02/2022     Lab Results   Component Value Date/Time    CHOL 124 03/02/2022 10:34 AM    TRIG 202 03/02/2022 10:34 AM    HDL 50 03/02/2022 10:34 AM    HDL 44 05/08/2012 10:12 AM    LDLCALC 34 03/02/2022 10:34 AM     Lab Results   Component Value Date    ALT 27 03/02/2022    AST 27 03/02/2022     Lab Results   Component Value Date    TSH 2.01 08/23/2011    TSH 1.78 03/24/2010     Lab Results   Component Value Date    WBC 6.9 03/02/2022    HGB 14.3 03/02/2022    HCT 43.4 03/02/2022    MCV 97.2 03/02/2022     03/02/2022     Lab Results   Component Value Date    INR 1.04 12/28/2020      Lab Results   Component Value Date    PSA 0.45 02/22/2016    PSA 0.47 02/02/2015    PSA 0.52 02/26/2014      Lab Results   Component Value Date    LABURIC 5.1 03/01/2021        Patient Active Problem List   Diagnosis    Essential hypertension    Controlled type 2 diabetes mellitus without complication, without long-term current use of insulin ([address]    ED (erectile dysfunction)    Hypogonadism male    Lipodermatosclerosis    Mixed hyperlipidemia    Morbidly obese (HCC)    Chronic gout of left foot    History of tobacco abuse       Allergies   Allergen Reactions    Augmentin [Amoxicillin-Pot Clavulanate]      Stomach pain with n/v    Bactrim [Sulfamethoxazole-Trimethoprim]      SOB and diaphorectic    Simvastatin      Myalgia    Sulfa Antibiotics      GI upset     Outpatient Medications Marked as Taking for the 8/1/22 encounter (Office Visit) with Slime Gomez MD   Medication Sig Dispense Refill    lisinopril (PRINIVIL;ZESTRIL) 20 MG tablet Take 1 tablet by mouth daily 90 tablet 3    atenolol (TENORMIN) 50 MG tablet Take 1 tablet by mouth daily 90 tablet 3    rosuvastatin (CRESTOR) 5 MG tablet Take 1 tablet by mouth nightly 90 tablet 3    allopurinol (ZYLOPRIM) 300 MG tablet Take 1 tablet by mouth daily 90 tablet 3    metFORMIN (GLUCOPHAGE) 1000 MG tablet Take 1 tablet by mouth 2 times daily (with meals) 180 tablet 3    clindamycin (CLEOCIN) 300 MG capsule Take 600 mg by mouth See Admin Instructions Prior to dental procedures only      ONE TOUCH ULTRA TEST strip TEST EVERY  strip 0    fluocinonide (LIDEX) 0.05 % ointment Apply topically daily Indications: 30 gm Apply topically 2 times daily. Compression Stockings MISC by Does not apply route 1 each 1    aspirin 81 MG EC tablet Take 81 mg by mouth daily. Review of Systems: 14 systems were negative except of what was stated on HPI    Nursing note and vitals reviewed.     Vitals:    08/01/22 1033   BP: 118/62   Pulse: 78   SpO2: 94%   Weight: 236 lb (107 kg)   Height: 5' 8\" (1.727 m)     Wt Readings from Last 3 Encounters:   08/01/22 236 lb (107 kg) 03/02/22 234 lb (106.1 kg)   09/15/21 243 lb (110.2 kg)     BP Readings from Last 3 Encounters:   08/01/22 118/62   03/02/22 (!) 122/58   09/15/21 122/80     Body mass index is 35.88 kg/m². Constitutional: Patient appears well-developed and well-nourished. No distress. Head: Normocephalic and atraumatic. Neck: Normal range of motion. Neck supple. No thyroidmegaly. Cardiovascular: Normal rate, regular rhythm, normal heart sounds and intact distal pulses. Pulmonary/Chest: Effort normal and breath sounds normal. No stridor. No respiratory distress. No wheezes and no rales. Abdominal: Soft. Bowel sounds are normal. No distension and no mass. No tenderness. No rebound and no guarding. Musculoskeletal: No edema and no tenderness. Skin: No rash or erythema.

## 2022-08-30 ENCOUNTER — TELEMEDICINE (OUTPATIENT)
Dept: INTERNAL MEDICINE CLINIC | Age: 72
End: 2022-08-30
Payer: MEDICARE

## 2022-08-30 DIAGNOSIS — E11.43 CONTROLLED TYPE 2 DIABETES MELLITUS WITH DIABETIC AUTONOMIC NEUROPATHY, WITHOUT LONG-TERM CURRENT USE OF INSULIN (HCC): Primary | ICD-10-CM

## 2022-08-30 DIAGNOSIS — E66.01 CLASS 2 SEVERE OBESITY WITH BODY MASS INDEX (BMI) OF 35 TO 39.9 WITH SERIOUS COMORBIDITY (HCC): ICD-10-CM

## 2022-08-30 PROBLEM — E66.812 CLASS 2 SEVERE OBESITY WITH BODY MASS INDEX (BMI) OF 35 TO 39.9 WITH SERIOUS COMORBIDITY: Status: ACTIVE | Noted: 2020-08-26

## 2022-08-30 PROCEDURE — 3051F HG A1C>EQUAL 7.0%<8.0%: CPT | Performed by: INTERNAL MEDICINE

## 2022-08-30 PROCEDURE — 3017F COLORECTAL CA SCREEN DOC REV: CPT | Performed by: INTERNAL MEDICINE

## 2022-08-30 PROCEDURE — 1123F ACP DISCUSS/DSCN MKR DOCD: CPT | Performed by: INTERNAL MEDICINE

## 2022-08-30 PROCEDURE — G8427 DOCREV CUR MEDS BY ELIG CLIN: HCPCS | Performed by: INTERNAL MEDICINE

## 2022-08-30 PROCEDURE — 2022F DILAT RTA XM EVC RTNOPTHY: CPT | Performed by: INTERNAL MEDICINE

## 2022-08-30 PROCEDURE — 99213 OFFICE O/P EST LOW 20 MIN: CPT | Performed by: INTERNAL MEDICINE

## 2022-08-30 RX ORDER — DULAGLUTIDE 1.5 MG/.5ML
1.5 INJECTION, SOLUTION SUBCUTANEOUS WEEKLY
Qty: 4 PEN | Refills: 0 | Status: SHIPPED | OUTPATIENT
Start: 2022-08-30 | End: 2022-09-27 | Stop reason: DRUGHIGH

## 2022-08-30 NOTE — PROGRESS NOTES
Pursuant to the emergency declaration under the [facility], [facility] waiver authority and the [employer] and [employer] Act, this Virtual  Video Visit was conducted, with patient's consent, to reduce the patient's risk of exposure to COVID-19 and provide continuity of care. Service is  provided through a video synchronous discussion virtually to substitute for in-person clinic visit with the patient being at home and Dr. Maury Gray being at home. Patient consent to the video visit. Date of Service:  8/30/2022    Chief Complaint:      Chief Complaint   Patient presents with    Diabetes     Med refill on Trulicity    Leg Pain     Recheck Leg cramps, stopping statins did not improve cramping       Assessment/Plan:    Justine Landers was seen today for diabetes and leg pain. Diagnoses and all orders for this visit:    Controlled type 2 diabetes mellitus with diabetic autonomic neuropathy, without long-term current use of insulin (HCC)  Increase Dulaglutide (TRULICITY) 1.5 JI/7.5XG SOPN; Inject 1.5 mg into the skin once a week    Class 2 severe obesity with body mass index (BMI) of 35 to 39.9 with serious comorbidity (HCC)  Goals:   -Eat small amounts of food 4-5 times daily  -Avoid high fat and high sugar foods  -Include protein with all meals and snacks  -Avoid carbonation and caffeine  -Avoid calorie containing beverages  -Increase physical activity as tolerated        Return VV 9/27 at 10:50 Diabetes. HPI:  Sandra De Oliveira is a 67 y.o. He complain of bilateral leg cramps daily despite hydration since May. Treatment Adherence:  Medication compliance: compliant all of the time  Diet compliance: compliant most of the time  Weight trend: stable 232  Current exercise: remodeling house daily, walk 2-3x/week  Barriers: none  Diabetes Mellitus Type 2: Current symptoms/problems include numbness and tingling in both bottom of feet for a year. Trulicity . 75 mcg q week and Metformin 1 g bid. Home blood sugar records: fasting range: 130-140 AM and 100-130 PM  Any episodes of hypoglycemia? no  Eye exam current (within one year): yes  Tobacco history: He reports that he has never smoked. He has never used smokeless tobacco.  Daily Aspirin? Yes  Known diabetic complications: none  Hypertension: Home blood pressure monitoring: Yes - 110/61-84 on Atenolol 50 mg qAM and Lisinopril 20 mg qAM. He is adherent to a low sodium diet. Patient denies chest pain, shortness of breath, headache, lightheadedness, blurred vision, peripheral edema, palpitations, dry cough and fatigue. Antihypertensive medication side effects: no medication side effects noted. Use of agents associated with hypertension: none. Hyperlipidemia: no new myalgia or weakness on Crestor 5 mg daily. Myalgia resolve off simvastatin (Zocor) 20 mg qhs. Trying to be on a low fat diet. Gout in left toe and ankle: resolve since been on Allopurinol 300 mg qd. Hypogonadism: he got off the Androgel due to nipple tenderness and he has not felt more tired. ED: unchanged. able to have an erection a little easier but not much of a difference when he was on testosterone replacement.         Lab Results   Component Value Date    LABA1C 7.3 08/01/2022    LABA1C 7.2 03/02/2022    LABA1C 6.6 09/01/2021    LABMICR <1.20 03/02/2022     Lab Results   Component Value Date     03/02/2022    K 5.5 (H) 03/02/2022    CL 99 03/02/2022    CO2 23 03/02/2022    BUN 19 03/02/2022    CREATININE 1.1 03/02/2022    GLUCOSE 167 (H) 03/02/2022    CALCIUM 10.0 03/02/2022     Lab Results   Component Value Date/Time    CHOL 124 03/02/2022 10:34 AM    TRIG 202 03/02/2022 10:34 AM    HDL 50 03/02/2022 10:34 AM    HDL 44 05/08/2012 10:12 AM    LDLCALC 34 03/02/2022 10:34 AM     Lab Results   Component Value Date    ALT 27 03/02/2022    AST 27 03/02/2022     Lab Results   Component Value Date    TSH 2.01 08/23/2011    TSH 1.78 03/24/2010     Lab Results   Component Value Date    WBC 6.9 03/02/2022    HGB 14.3 03/02/2022    HCT 43.4 03/02/2022    MCV 97.2 03/02/2022     03/02/2022     Lab Results   Component Value Date    INR 1.04 12/28/2020      Lab Results   Component Value Date    PSA 0.45 02/22/2016    PSA 0.47 02/02/2015    PSA 0.52 02/26/2014      Lab Results   Component Value Date    LABURIC 5.1 03/01/2021        Patient Active Problem List   Diagnosis    Essential hypertension    Controlled type 2 diabetes mellitus without complication, without long-term current use of insulin ([facility] [address]    ED (erectile dysfunction)    Hypogonadism male    Lipodermatosclerosis    Mixed hyperlipidemia    Morbidly obese (HCC)    Chronic gout of left foot    History of tobacco abuse       Allergies   Allergen Reactions    Augmentin [Amoxicillin-Pot Clavulanate]      Stomach pain with n/v    Bactrim [Sulfamethoxazole-Trimethoprim]      SOB and diaphorectic    Simvastatin      Myalgia    Sulfa Antibiotics      GI upset     Outpatient Medications Marked as Taking for the 8/30/22 encounter (Telemedicine) with Omar Espinal MD   Medication Sig Dispense Refill    Dulaglutide (TRULICITY) 0.61 AR/5.2IS SOPN Inject 0.75 mg into the skin once a week 4 pen 0    lisinopril (PRINIVIL;ZESTRIL) 20 MG tablet Take 1 tablet by mouth daily 90 tablet 3    atenolol (TENORMIN) 50 MG tablet Take 1 tablet by mouth daily 90 tablet 3    rosuvastatin (CRESTOR) 5 MG tablet Take 1 tablet by mouth nightly 90 tablet 3    allopurinol (ZYLOPRIM) 300 MG tablet Take 1 tablet by mouth daily 90 tablet 3    metFORMIN (GLUCOPHAGE) 1000 MG tablet Take 1 tablet by mouth 2 times daily (with meals) 180 tablet 3    clindamycin (CLEOCIN) 300 MG capsule Take 600 mg by mouth See Admin Instructions Prior to dental procedures only      ONE TOUCH ULTRA TEST strip TEST EVERY  strip 0    fluocinonide (LIDEX) 0.05 % ointment Apply topically daily Indications: 30 gm Apply topically 2 times daily. Compression Stockings MISC by Does not apply route 1 each 1    aspirin 81 MG EC tablet Take 81 mg by mouth daily. Review of Systems: 14 systems were negative except of what was stated on HPI    Nursing note and vitals reviewed. There were no vitals filed for this visit. Wt Readings from Last 3 Encounters:   08/01/22 236 lb (107 kg)   03/02/22 234 lb (106.1 kg)   09/15/21 243 lb (110.2 kg)     BP Readings from Last 3 Encounters:   08/01/22 118/62   03/02/22 (!) 122/58   09/15/21 122/80     There is no height or weight on file to calculate BMI. Constitutional: Patient appears well-developed and well-nourished. No distress. Head: Normocephalic and atraumatic. Psychiatric: Normal mood and affect. Behavior is normal.     Done on last office visit 8/1/22: Diabetic Foot exam: Normal pedal pulses and bilateral sensation with 10 gm filament pin prick testing. No lesions, sores/ulcerations, toe nail fungus or deformities.

## 2022-08-31 ENCOUNTER — HOSPITAL ENCOUNTER (OUTPATIENT)
Age: 72
Discharge: HOME | End: 2022-08-31
Payer: MEDICARE

## 2022-08-31 LAB — URIC ACID, SERUM: 3.9 MG/DL (ref 3.5–7.2)

## 2022-08-31 PROCEDURE — 84550 ASSAY OF BLOOD/URIC ACID: CPT

## 2022-09-27 ENCOUNTER — TELEMEDICINE (OUTPATIENT)
Dept: INTERNAL MEDICINE CLINIC | Age: 72
End: 2022-09-27
Payer: MEDICARE

## 2022-09-27 DIAGNOSIS — E11.43 CONTROLLED TYPE 2 DIABETES MELLITUS WITH DIABETIC AUTONOMIC NEUROPATHY, WITHOUT LONG-TERM CURRENT USE OF INSULIN (HCC): ICD-10-CM

## 2022-09-27 PROCEDURE — 99213 OFFICE O/P EST LOW 20 MIN: CPT | Performed by: INTERNAL MEDICINE

## 2022-09-27 PROCEDURE — 1123F ACP DISCUSS/DSCN MKR DOCD: CPT | Performed by: INTERNAL MEDICINE

## 2022-09-27 PROCEDURE — 3017F COLORECTAL CA SCREEN DOC REV: CPT | Performed by: INTERNAL MEDICINE

## 2022-09-27 PROCEDURE — G8427 DOCREV CUR MEDS BY ELIG CLIN: HCPCS | Performed by: INTERNAL MEDICINE

## 2022-09-27 PROCEDURE — 2022F DILAT RTA XM EVC RTNOPTHY: CPT | Performed by: INTERNAL MEDICINE

## 2022-09-27 PROCEDURE — 3051F HG A1C>EQUAL 7.0%<8.0%: CPT | Performed by: INTERNAL MEDICINE

## 2022-09-27 RX ORDER — DULAGLUTIDE 3 MG/.5ML
3 INJECTION, SOLUTION SUBCUTANEOUS WEEKLY
Qty: 4 ADJUSTABLE DOSE PRE-FILLED PEN SYRINGE | Refills: 0 | Status: SHIPPED | OUTPATIENT
Start: 2022-09-27 | End: 2022-10-24 | Stop reason: DRUGHIGH

## 2022-09-27 SDOH — ECONOMIC STABILITY: FOOD INSECURITY: WITHIN THE PAST 12 MONTHS, THE FOOD YOU BOUGHT JUST DIDN'T LAST AND YOU DIDN'T HAVE MONEY TO GET MORE.: NEVER TRUE

## 2022-09-27 SDOH — ECONOMIC STABILITY: FOOD INSECURITY: WITHIN THE PAST 12 MONTHS, YOU WORRIED THAT YOUR FOOD WOULD RUN OUT BEFORE YOU GOT MONEY TO BUY MORE.: NEVER TRUE

## 2022-09-27 ASSESSMENT — SOCIAL DETERMINANTS OF HEALTH (SDOH): HOW HARD IS IT FOR YOU TO PAY FOR THE VERY BASICS LIKE FOOD, HOUSING, MEDICAL CARE, AND HEATING?: NOT HARD AT ALL

## 2022-09-27 NOTE — PROGRESS NOTES
Pursuant to the emergency declaration under the [address] [facility], [facility] waiver authority and the [employer] and [employer] Act, this Virtual  Video Visit was conducted, with patient's consent, to reduce the patient's risk of exposure to COVID-19 and provide continuity of care. Service is  provided through a video synchronous discussion virtually to substitute for in-person clinic visit with the patient being at home and Dr. Tiffany Duran being at home. Patient consent to the video visit. Date of Service:  9/27/2022    Chief Complaint:      Chief Complaint   Patient presents with    Diabetes       Assessment/Plan:    Shan Garcia was seen today for diabetes. Diagnoses and all orders for this visit:    Controlled type 2 diabetes mellitus with diabetic autonomic neuropathy, without long-term current use of insulin (HCC)  Increase Dulaglutide (TRULICITY) 3 IT/3.7DR SOPN; Inject 3 mg into the skin once a week  Patient to only eat 1/2 dinner and eat the other half next day for lunch to reduce nausea and increase metabolism        Return Oct 24 at 2:10 Diabetes. HPI:  Sully Birmingham is a 67 y.o. Treatment Adherence:  Medication compliance: compliant all of the time  Diet compliance: compliant most of the time  Weight trend: stable 230 since have not reduce portion size  Current exercise: remodeling house daily, walk 2-3x/week  Barriers: none  Diabetes Mellitus Type 2: Current symptoms/problems include numbness and tingling in both bottom of feet for a year. Trulicity 1.5 mcg q week with some nausea and Metformin 1 g bid. Home blood sugar records: fasting range: 110 AM and 100-130 PM  Any episodes of hypoglycemia? no  Eye exam current (within one year): yes  Tobacco history: He reports that he has never smoked. He has never used smokeless tobacco.  Daily Aspirin?  Yes  Known diabetic complications: none  Hypertension: Home blood pressure monitoring: Yes - 110/61-84 on Atenolol 50 mg qAM and Lisinopril 20 mg qAM. He is adherent to a low sodium diet. Patient denies chest pain, shortness of breath, headache, lightheadedness, blurred vision, peripheral edema, palpitations, dry cough and fatigue. Antihypertensive medication side effects: no medication side effects noted. Use of agents associated with hypertension: none. Hyperlipidemia: no new myalgia or weakness on Crestor 5 mg daily. Myalgia resolve off simvastatin (Zocor) 20 mg qhs. Trying to be on a low fat diet. Gout in left toe and ankle: resolve since been on Allopurinol 300 mg qd. Hypogonadism: he got off the Androgel due to nipple tenderness and he has not felt more tired. ED: unchanged. able to have an erection a little easier but not much of a difference when he was on testosterone replacement.         Lab Results   Component Value Date    LABA1C 7.3 08/01/2022    LABA1C 7.2 03/02/2022    LABA1C 6.6 09/01/2021    LABMICR <1.20 03/02/2022     Lab Results   Component Value Date     03/02/2022    K 5.5 (H) 03/02/2022    CL 99 03/02/2022    CO2 23 03/02/2022    BUN 19 03/02/2022    CREATININE 1.1 03/02/2022    GLUCOSE 167 (H) 03/02/2022    CALCIUM 10.0 03/02/2022     Lab Results   Component Value Date/Time    CHOL 124 03/02/2022 10:34 AM    TRIG 202 03/02/2022 10:34 AM    HDL 50 03/02/2022 10:34 AM    HDL 44 05/08/2012 10:12 AM    LDLCALC 34 03/02/2022 10:34 AM     Lab Results   Component Value Date    ALT 27 03/02/2022    AST 27 03/02/2022     Lab Results   Component Value Date    TSH 2.01 08/23/2011    TSH 1.78 03/24/2010     Lab Results   Component Value Date    WBC 6.9 03/02/2022    HGB 14.3 03/02/2022    HCT 43.4 03/02/2022    MCV 97.2 03/02/2022     03/02/2022     Lab Results   Component Value Date    INR 1.04 12/28/2020      Lab Results   Component Value Date    PSA 0.45 02/22/2016    PSA 0.47 02/02/2015    PSA 0.52 02/26/2014      Lab Results   Component Value Date    LABURIC 3.9 08/31/2022        Patient Active Problem List   Diagnosis    Essential hypertension    Controlled type 2 diabetes mellitus with diabetic autonomic neuropathy, without long-term current use of insulin ([facility] [address]    ED (erectile dysfunction)    Hypogonadism male    Lipodermatosclerosis    Mixed hyperlipidemia    Class 2 severe obesity with body mass index (BMI) of 35 to 39.9 with serious comorbidity (HCC)    Chronic gout of left foot    History of tobacco abuse       Allergies   Allergen Reactions    Augmentin [Amoxicillin-Pot Clavulanate]      Stomach pain with n/v    Bactrim [Sulfamethoxazole-Trimethoprim]      SOB and diaphorectic    Simvastatin      Myalgia    Sulfa Antibiotics      GI upset     Outpatient Medications Marked as Taking for the 9/27/22 encounter (Telemedicine) with Paula Espinal MD   Medication Sig Dispense Refill    Dulaglutide (TRULICITY) 1.5 JA/2.6BF SOPN Inject 1.5 mg into the skin once a week 4 pen 0    lisinopril (PRINIVIL;ZESTRIL) 20 MG tablet Take 1 tablet by mouth daily 90 tablet 3    atenolol (TENORMIN) 50 MG tablet Take 1 tablet by mouth daily 90 tablet 3    rosuvastatin (CRESTOR) 5 MG tablet Take 1 tablet by mouth nightly 90 tablet 3    allopurinol (ZYLOPRIM) 300 MG tablet Take 1 tablet by mouth daily 90 tablet 3    metFORMIN (GLUCOPHAGE) 1000 MG tablet Take 1 tablet by mouth 2 times daily (with meals) 180 tablet 3    clindamycin (CLEOCIN) 300 MG capsule Take 600 mg by mouth See Admin Instructions Prior to dental procedures only      ONE TOUCH ULTRA TEST strip TEST EVERY  strip 0    fluocinonide (LIDEX) 0.05 % ointment Apply topically daily Indications: 30 gm Apply topically 2 times daily. Compression Stockings MISC by Does not apply route 1 each 1    aspirin 81 MG EC tablet Take 81 mg by mouth daily. Review of Systems: 14 systems were negative except of what was stated on HPI    Nursing note and vitals reviewed. There were no vitals filed for this visit.   Wt Readings from Last 3 Encounters:   08/01/22 236 lb (107 kg)   03/02/22 234 lb (106.1 kg)   09/15/21 243 lb (110.2 kg)     BP Readings from Last 3 Encounters:   08/01/22 118/62   03/02/22 (!) 122/58   09/15/21 122/80     There is no height or weight on file to calculate BMI. Constitutional: Patient appears well-developed and well-nourished. No distress. Head: Normocephalic and atraumatic. Psychiatric: Normal mood and affect.  Behavior is normal.

## 2022-10-24 ENCOUNTER — OFFICE VISIT (OUTPATIENT)
Dept: INTERNAL MEDICINE CLINIC | Age: 72
End: 2022-10-24
Payer: MEDICARE

## 2022-10-24 VITALS
OXYGEN SATURATION: 99 % | BODY MASS INDEX: 34.56 KG/M2 | SYSTOLIC BLOOD PRESSURE: 102 MMHG | HEIGHT: 68 IN | DIASTOLIC BLOOD PRESSURE: 58 MMHG | WEIGHT: 228 LBS | HEART RATE: 92 BPM

## 2022-10-24 DIAGNOSIS — E11.43 CONTROLLED TYPE 2 DIABETES MELLITUS WITH DIABETIC AUTONOMIC NEUROPATHY, WITHOUT LONG-TERM CURRENT USE OF INSULIN (HCC): Primary | ICD-10-CM

## 2022-10-24 DIAGNOSIS — Z23 NEED FOR INFLUENZA VACCINATION: ICD-10-CM

## 2022-10-24 LAB — HBA1C MFR BLD: 5.9 %

## 2022-10-24 PROCEDURE — G8484 FLU IMMUNIZE NO ADMIN: HCPCS | Performed by: INTERNAL MEDICINE

## 2022-10-24 PROCEDURE — 1123F ACP DISCUSS/DSCN MKR DOCD: CPT | Performed by: INTERNAL MEDICINE

## 2022-10-24 PROCEDURE — G8417 CALC BMI ABV UP PARAM F/U: HCPCS | Performed by: INTERNAL MEDICINE

## 2022-10-24 PROCEDURE — 90694 VACC AIIV4 NO PRSRV 0.5ML IM: CPT | Performed by: INTERNAL MEDICINE

## 2022-10-24 PROCEDURE — G8427 DOCREV CUR MEDS BY ELIG CLIN: HCPCS | Performed by: INTERNAL MEDICINE

## 2022-10-24 PROCEDURE — G0008 ADMIN INFLUENZA VIRUS VAC: HCPCS | Performed by: INTERNAL MEDICINE

## 2022-10-24 PROCEDURE — 2022F DILAT RTA XM EVC RTNOPTHY: CPT | Performed by: INTERNAL MEDICINE

## 2022-10-24 PROCEDURE — 3044F HG A1C LEVEL LT 7.0%: CPT | Performed by: INTERNAL MEDICINE

## 2022-10-24 PROCEDURE — 3017F COLORECTAL CA SCREEN DOC REV: CPT | Performed by: INTERNAL MEDICINE

## 2022-10-24 PROCEDURE — 99213 OFFICE O/P EST LOW 20 MIN: CPT | Performed by: INTERNAL MEDICINE

## 2022-10-24 PROCEDURE — 1036F TOBACCO NON-USER: CPT | Performed by: INTERNAL MEDICINE

## 2022-10-24 RX ORDER — DULAGLUTIDE 4.5 MG/.5ML
4.5 INJECTION, SOLUTION SUBCUTANEOUS WEEKLY
Qty: 4 ADJUSTABLE DOSE PRE-FILLED PEN SYRINGE | Refills: 0 | Status: SHIPPED | OUTPATIENT
Start: 2022-10-24 | End: 2022-12-01 | Stop reason: ALTCHOICE

## 2022-10-24 NOTE — PROGRESS NOTES
Laure Parekh  YOB: 1950    Date of Service:  10/24/2022    Chief Complaint:      Chief Complaint   Patient presents with    Diabetes       Assessment/Plan:  [facility] was seen today for diabetes. Diagnoses and all orders for this visit:    Controlled type 2 diabetes mellitus with diabetic autonomic neuropathy, without long-term current use of insulin (HCC)  -     POCT glycosylated hemoglobin (Hb A1C)  Increase Dulaglutide (TRULICITY) 4.5 DB/5.2KF SOPN; Inject 4.5 mg into the skin once a week  Discontinue metformin    Need for influenza vaccination  -     Influenza, FLUAD, (age 72 y+), IM, Preservative Free, 0.5 mL    Stable and continue on current medications. Return keep 3/6 AWV.      HPI:  Alli Hutchinson is a 67 y.o. Treatment Adherence:  Medication compliance: compliant all of the time  Diet compliance: compliant most of the time  Weight trend: stable 230 since have not reduce portion size  Current exercise: remodeling house daily, walk 2-3x/week  Barriers: none  Diabetes Mellitus Type 2: Current symptoms/problems include numbness and tingling in both bottom of feet for a year. Trulicity 3 mcg q week with some nausea and Metformin 1 g bid. Home blood sugar records: fasting range: 110-135 AM and 100-130 PM  Any episodes of hypoglycemia? no  Eye exam current (within one year): yes  Tobacco history: He reports that he has never smoked. He has never used smokeless tobacco.  Daily Aspirin? Yes  Known diabetic complications: none  Hypertension: Home blood pressure monitoring: Yes - 110/61-84 on Atenolol 50 mg qAM and Lisinopril 20 mg qAM. He is adherent to a low sodium diet. Patient denies chest pain, shortness of breath, headache, lightheadedness, blurred vision, peripheral edema, palpitations, dry cough and fatigue. Antihypertensive medication side effects: no medication side effects noted. Use of agents associated with hypertension: none.   Hyperlipidemia: having myalgia daily on Crestor 5 mg daily and pain resolve off medication for 1 1/2 week. Myalgia resolve off simvastatin (Zocor) 20 mg qhs. Trying to be on a low fat diet. Gout in left toe and ankle: resolve since been on Allopurinol 300 mg qd. Hypogonadism: he got off the Androgel due to nipple tenderness and he has not felt more tired. ED: unchanged. able to have an erection a little easier but not much of a difference when he was on testosterone replacement.         Lab Results   Component Value Date    LABA1C 7.3 08/01/2022    LABA1C 7.2 03/02/2022    LABA1C 6.6 09/01/2021    LABMICR <1.20 03/02/2022     Lab Results   Component Value Date     03/02/2022    K 5.5 (H) 03/02/2022    CL 99 03/02/2022    CO2 23 03/02/2022    BUN 19 03/02/2022    CREATININE 1.1 03/02/2022    GLUCOSE 167 (H) 03/02/2022    CALCIUM 10.0 03/02/2022     Lab Results   Component Value Date/Time    CHOL 124 03/02/2022 10:34 AM    TRIG 202 03/02/2022 10:34 AM    HDL 50 03/02/2022 10:34 AM    HDL 44 05/08/2012 10:12 AM    LDLCALC 34 03/02/2022 10:34 AM     Lab Results   Component Value Date    ALT 27 03/02/2022    AST 27 03/02/2022     Lab Results   Component Value Date    TSH 2.01 08/23/2011    TSH 1.78 03/24/2010     Lab Results   Component Value Date    WBC 6.9 03/02/2022    HGB 14.3 03/02/2022    HCT 43.4 03/02/2022    MCV 97.2 03/02/2022     03/02/2022     Lab Results   Component Value Date    INR 1.04 12/28/2020      Lab Results   Component Value Date    PSA 0.45 02/22/2016    PSA 0.47 02/02/2015    PSA 0.52 02/26/2014      Lab Results   Component Value Date    LABURIC 3.9 08/31/2022        Patient Active Problem List   Diagnosis    Essential hypertension    Controlled type 2 diabetes mellitus with diabetic autonomic neuropathy, without long-term current use of insulin ([address]    ED (erectile dysfunction)    Hypogonadism male    Lipodermatosclerosis    Mixed hyperlipidemia    Class 2 severe obesity with body mass index (BMI) of 35 to 39.9 with serious comorbidity (HCC)    Chronic gout of left foot    History of tobacco abuse       Allergies   Allergen Reactions    Augmentin [Amoxicillin-Pot Clavulanate]      Stomach pain with n/v    Bactrim [Sulfamethoxazole-Trimethoprim]      SOB and diaphorectic    Simvastatin      Myalgia    Sulfa Antibiotics      GI upset     Outpatient Medications Marked as Taking for the 10/24/22 encounter (Office Visit) with Wicho Christina MD   Medication Sig Dispense Refill    Dulaglutide (TRULICITY) 3 XV/8.1AA SOPN Inject 3 mg into the skin once a week 4 Adjustable Dose Pre-filled Pen Syringe 0    lisinopril (PRINIVIL;ZESTRIL) 20 MG tablet Take 1 tablet by mouth daily 90 tablet 3    atenolol (TENORMIN) 50 MG tablet Take 1 tablet by mouth daily 90 tablet 3    rosuvastatin (CRESTOR) 5 MG tablet Take 1 tablet by mouth nightly 90 tablet 3    allopurinol (ZYLOPRIM) 300 MG tablet Take 1 tablet by mouth daily 90 tablet 3    metFORMIN (GLUCOPHAGE) 1000 MG tablet Take 1 tablet by mouth 2 times daily (with meals) 180 tablet 3    clindamycin (CLEOCIN) 300 MG capsule Take 600 mg by mouth See Admin Instructions Prior to dental procedures only      ONE TOUCH ULTRA TEST strip TEST EVERY  strip 0    fluocinonide (LIDEX) 0.05 % ointment Apply topically daily Indications: 30 gm Apply topically 2 times daily. Compression Stockings MISC by Does not apply route 1 each 1    aspirin 81 MG EC tablet Take 81 mg by mouth daily. Review of Systems: 14 systems were negative except of what was stated on HPI    Nursing note and vitals reviewed.     Vitals:    10/24/22 1409   BP: (!) 102/58   Pulse: 92   SpO2: 99%   Weight: 228 lb (103.4 kg)   Height: 5' 8\" (1.727 m)     Wt Readings from Last 3 Encounters:   10/24/22 228 lb (103.4 kg)   08/01/22 236 lb (107 kg)   03/02/22 234 lb (106.1 kg)     BP Readings from Last 3 Encounters:   10/24/22 (!) 102/58   08/01/22 118/62   03/02/22 (!) 122/58     Body mass index is 34.67 kg/m². Constitutional: Patient appears well-developed and well-nourished. No distress. Head: Normocephalic and atraumatic. Neck: Normal range of motion. Neck supple. No thyroidmegaly. Cardiovascular: Normal rate, regular rhythm, normal heart sounds and intact distal pulses. Pulmonary/Chest: Effort normal and breath sounds normal. No stridor. No respiratory distress. No wheezes and no rales. Abdominal: Soft. Bowel sounds are normal. No distension and no mass. No tenderness. No rebound and no guarding. Musculoskeletal: No edema and no tenderness. Skin: No rash or erythema.

## 2022-12-01 DIAGNOSIS — E11.43 CONTROLLED TYPE 2 DIABETES MELLITUS WITH DIABETIC AUTONOMIC NEUROPATHY, WITHOUT LONG-TERM CURRENT USE OF INSULIN (HCC): Primary | ICD-10-CM

## 2022-12-01 RX ORDER — TIRZEPATIDE 7.5 MG/.5ML
7.5 INJECTION, SOLUTION SUBCUTANEOUS WEEKLY
Qty: 4 ADJUSTABLE DOSE PRE-FILLED PEN SYRINGE | Refills: 0 | Status: SHIPPED | OUTPATIENT
Start: 2022-12-01

## 2022-12-12 DIAGNOSIS — E11.43 CONTROLLED TYPE 2 DIABETES MELLITUS WITH DIABETIC AUTONOMIC NEUROPATHY, WITHOUT LONG-TERM CURRENT USE OF INSULIN (HCC): Primary | ICD-10-CM

## 2022-12-12 RX ORDER — TIRZEPATIDE 5 MG/.5ML
5 INJECTION, SOLUTION SUBCUTANEOUS WEEKLY
Qty: 4 ADJUSTABLE DOSE PRE-FILLED PEN SYRINGE | Refills: 0 | Status: SHIPPED | OUTPATIENT
Start: 2022-12-12

## 2022-12-13 ENCOUNTER — TELEPHONE (OUTPATIENT)
Dept: ADMINISTRATIVE | Age: 72
End: 2022-12-13

## 2022-12-13 NOTE — TELEPHONE ENCOUNTER
PA submitted VIA CM for  [facility] 5MG/0.5ML pen-injectors Key: [id number] - PA Case ID: [id number] - Rx #: [id number]  PENDING      Your PA request has been denied. Additional information will be provided in the denial communication.

## 2022-12-20 NOTE — TELEPHONE ENCOUNTER
Diana Parisi has been denied.  Even if covered it is going to expensive (over 500.00)   Patient  to download form for appeal to see if it is worth pursuing (due to cost)  Please advise

## 2022-12-21 DIAGNOSIS — E11.9 CONTROLLED TYPE 2 DIABETES MELLITUS WITHOUT COMPLICATION, WITHOUT LONG-TERM CURRENT USE OF INSULIN (HCC): ICD-10-CM

## 2022-12-21 NOTE — TELEPHONE ENCOUNTER
Received forms from patient to PA or appeal if needed for the mounjaro. Forms completed, scanned, and faxed to insurance.   Await response from insurance

## 2022-12-28 NOTE — TELEPHONE ENCOUNTER
Received denial for mounjaro from insurance. Does not meet medical necessity.  (Scanned)  Please advise

## 2022-12-28 NOTE — TELEPHONE ENCOUNTER
Unable to locate trulicity 4.5 mg. [facility] (0.75 and 1.5 mg), [facility] ([place]) have none. No [facility] [place], No [facility]. No [facility], No [facility] with patient, we will try to locate med.    (He states his wife will also need Rx sent)  Will keep trying

## 2022-12-28 NOTE — TELEPHONE ENCOUNTER
[facility], [facility] Lange and Tod Mcintyre returned call and states it is still on back order and has no way to determine an expected date to receive it. [facility] has only the 1.5 mg.  Please advise.

## 2022-12-29 RX ORDER — DULAGLUTIDE 1.5 MG/.5ML
1.5 INJECTION, SOLUTION SUBCUTANEOUS WEEKLY
Qty: 4 ADJUSTABLE DOSE PRE-FILLED PEN SYRINGE | Refills: 2 | Status: SHIPPED | OUTPATIENT
Start: 2022-12-29

## 2022-12-29 NOTE — TELEPHONE ENCOUNTER
Spoke with patient. He wants to stay on the metformin only for now. His fasting glucose has been running 120-130. FYI  He states he tested positive for Covid yesterday at home. He is doing fine with rest, OTC meds, lots of fluids. He does not wish to be treated at this time, but was advised if things worsen he can do an E-visit, or appointment with a virtualist if needed. Gave all recommended CDC guidelines and precautions. Patient states he is aware and will f/up if symptoms change for the worse.

## 2023-01-03 DIAGNOSIS — E11.9 CONTROLLED TYPE 2 DIABETES MELLITUS WITHOUT COMPLICATION, WITHOUT LONG-TERM CURRENT USE OF INSULIN (HCC): ICD-10-CM

## 2023-02-17 LAB — DIABETIC RETINOPATHY: NEGATIVE

## 2023-02-27 SDOH — HEALTH STABILITY: PHYSICAL HEALTH: ON AVERAGE, HOW MANY MINUTES DO YOU ENGAGE IN EXERCISE AT THIS LEVEL?: 40 MIN

## 2023-02-27 SDOH — HEALTH STABILITY: PHYSICAL HEALTH: ON AVERAGE, HOW MANY DAYS PER WEEK DO YOU ENGAGE IN MODERATE TO STRENUOUS EXERCISE (LIKE A BRISK WALK)?: 1 DAY

## 2023-02-27 ASSESSMENT — PATIENT HEALTH QUESTIONNAIRE - PHQ9
SUM OF ALL RESPONSES TO PHQ QUESTIONS 1-9: 0
2. FEELING DOWN, DEPRESSED OR HOPELESS: 0
1. LITTLE INTEREST OR PLEASURE IN DOING THINGS: 0
SUM OF ALL RESPONSES TO PHQ9 QUESTIONS 1 & 2: 0
SUM OF ALL RESPONSES TO PHQ QUESTIONS 1-9: 0

## 2023-02-27 ASSESSMENT — LIFESTYLE VARIABLES
HOW MANY STANDARD DRINKS CONTAINING ALCOHOL DO YOU HAVE ON A TYPICAL DAY: 0
HOW OFTEN DO YOU HAVE SIX OR MORE DRINKS ON ONE OCCASION: 1
HOW OFTEN DO YOU HAVE A DRINK CONTAINING ALCOHOL: 1
HOW OFTEN DO YOU HAVE A DRINK CONTAINING ALCOHOL: NEVER
HOW MANY STANDARD DRINKS CONTAINING ALCOHOL DO YOU HAVE ON A TYPICAL DAY: PATIENT DOES NOT DRINK

## 2023-03-01 DIAGNOSIS — M1A.0720 CHRONIC GOUT OF LEFT FOOT, UNSPECIFIED CAUSE: ICD-10-CM

## 2023-03-01 RX ORDER — ALLOPURINOL 300 MG/1
300 TABLET ORAL DAILY
Qty: 90 TABLET | Refills: 3 | Status: SHIPPED | OUTPATIENT
Start: 2023-03-01

## 2023-03-06 ENCOUNTER — OFFICE VISIT (OUTPATIENT)
Dept: INTERNAL MEDICINE CLINIC | Age: 73
End: 2023-03-06
Payer: MEDICARE

## 2023-03-06 VITALS
DIASTOLIC BLOOD PRESSURE: 64 MMHG | BODY MASS INDEX: 33.65 KG/M2 | HEIGHT: 68 IN | WEIGHT: 222 LBS | OXYGEN SATURATION: 99 % | HEART RATE: 75 BPM | SYSTOLIC BLOOD PRESSURE: 120 MMHG

## 2023-03-06 DIAGNOSIS — E11.40 NEUROPATHY DUE TO TYPE 2 DIABETES MELLITUS (HCC): ICD-10-CM

## 2023-03-06 DIAGNOSIS — M62.40: ICD-10-CM

## 2023-03-06 DIAGNOSIS — E78.2 MIXED HYPERLIPIDEMIA: ICD-10-CM

## 2023-03-06 DIAGNOSIS — I10 ESSENTIAL HYPERTENSION: ICD-10-CM

## 2023-03-06 DIAGNOSIS — Z00.00 MEDICARE ANNUAL WELLNESS VISIT, SUBSEQUENT: Primary | ICD-10-CM

## 2023-03-06 DIAGNOSIS — E11.43 CONTROLLED TYPE 2 DIABETES MELLITUS WITH DIABETIC AUTONOMIC NEUROPATHY, WITHOUT LONG-TERM CURRENT USE OF INSULIN (HCC): ICD-10-CM

## 2023-03-06 DIAGNOSIS — Z79.899 CONTROLLED SUBSTANCE AGREEMENT SIGNED: ICD-10-CM

## 2023-03-06 LAB — HBA1C MFR BLD: 6 %

## 2023-03-06 PROCEDURE — 36415 COLL VENOUS BLD VENIPUNCTURE: CPT | Performed by: INTERNAL MEDICINE

## 2023-03-06 PROCEDURE — 3044F HG A1C LEVEL LT 7.0%: CPT | Performed by: INTERNAL MEDICINE

## 2023-03-06 PROCEDURE — 1036F TOBACCO NON-USER: CPT | Performed by: INTERNAL MEDICINE

## 2023-03-06 PROCEDURE — 3017F COLORECTAL CA SCREEN DOC REV: CPT | Performed by: INTERNAL MEDICINE

## 2023-03-06 PROCEDURE — 3078F DIAST BP <80 MM HG: CPT | Performed by: INTERNAL MEDICINE

## 2023-03-06 PROCEDURE — G8484 FLU IMMUNIZE NO ADMIN: HCPCS | Performed by: INTERNAL MEDICINE

## 2023-03-06 PROCEDURE — 2022F DILAT RTA XM EVC RTNOPTHY: CPT | Performed by: INTERNAL MEDICINE

## 2023-03-06 PROCEDURE — G0439 PPPS, SUBSEQ VISIT: HCPCS | Performed by: INTERNAL MEDICINE

## 2023-03-06 PROCEDURE — 3074F SYST BP LT 130 MM HG: CPT | Performed by: INTERNAL MEDICINE

## 2023-03-06 PROCEDURE — 1123F ACP DISCUSS/DSCN MKR DOCD: CPT | Performed by: INTERNAL MEDICINE

## 2023-03-06 PROCEDURE — G8427 DOCREV CUR MEDS BY ELIG CLIN: HCPCS | Performed by: INTERNAL MEDICINE

## 2023-03-06 PROCEDURE — G8417 CALC BMI ABV UP PARAM F/U: HCPCS | Performed by: INTERNAL MEDICINE

## 2023-03-06 PROCEDURE — 99214 OFFICE O/P EST MOD 30 MIN: CPT | Performed by: INTERNAL MEDICINE

## 2023-03-06 PROCEDURE — 83036 HEMOGLOBIN GLYCOSYLATED A1C: CPT | Performed by: INTERNAL MEDICINE

## 2023-03-06 RX ORDER — LISINOPRIL 20 MG/1
20 TABLET ORAL DAILY
Qty: 90 TABLET | Refills: 3 | Status: SHIPPED | OUTPATIENT
Start: 2023-03-06

## 2023-03-06 RX ORDER — GABAPENTIN 300 MG/1
300 CAPSULE ORAL 3 TIMES DAILY
Qty: 90 CAPSULE | Refills: 0 | Status: SHIPPED | OUTPATIENT
Start: 2023-03-06 | End: 2023-06-04

## 2023-03-06 SDOH — ECONOMIC STABILITY: FOOD INSECURITY: WITHIN THE PAST 12 MONTHS, YOU WORRIED THAT YOUR FOOD WOULD RUN OUT BEFORE YOU GOT MONEY TO BUY MORE.: NEVER TRUE

## 2023-03-06 SDOH — ECONOMIC STABILITY: INCOME INSECURITY: HOW HARD IS IT FOR YOU TO PAY FOR THE VERY BASICS LIKE FOOD, HOUSING, MEDICAL CARE, AND HEATING?: NOT HARD AT ALL

## 2023-03-06 SDOH — ECONOMIC STABILITY: TRANSPORTATION INSECURITY
IN THE PAST 12 MONTHS, HAS LACK OF TRANSPORTATION KEPT YOU FROM MEETINGS, WORK, OR FROM GETTING THINGS NEEDED FOR DAILY LIVING?: NO

## 2023-03-06 SDOH — ECONOMIC STABILITY: FOOD INSECURITY: WITHIN THE PAST 12 MONTHS, THE FOOD YOU BOUGHT JUST DIDN'T LAST AND YOU DIDN'T HAVE MONEY TO GET MORE.: NEVER TRUE

## 2023-03-06 SDOH — ECONOMIC STABILITY: HOUSING INSECURITY
IN THE LAST 12 MONTHS, WAS THERE A TIME WHEN YOU DID NOT HAVE A STEADY PLACE TO SLEEP OR SLEPT IN A SHELTER (INCLUDING NOW)?: NO

## 2023-03-06 NOTE — PATIENT INSTRUCTIONS
Preventing Falls: Care Instructions  Overview     Getting around your home safely can be a challenge if you have injuries or health problems that make it easy for you to fall. Loose rugs and furniture in walkways are among the dangers for many older people who have problems walking or who have poor eyesight. People who have conditions such as arthritis, osteoporosis, or dementia also have to be careful not to fall. You can make your home safer with a few simple measures. Follow-up care is a key part of your treatment and safety. Be sure to make and go to all appointments, and call your doctor if you are having problems. It's also a good idea to know your test results and keep a list of the medicines you take. How can you care for yourself at home? Taking care of yourself  Exercise regularly to improve your strength, muscle tone, and balance. Walk if you can. Swimming may be a good choice if you cannot walk easily. Have your vision and hearing checked each year or any time you notice a change. If you have trouble seeing and hearing, you might not be able to avoid objects and could lose your balance. Know the side effects of the medicines you take. Ask your doctor or pharmacist whether the medicines you take can affect your balance. Sleeping pills or sedatives can affect your balance. Limit the amount of alcohol you drink. Alcohol can impair your balance and other senses. Ask your doctor whether calluses or corns on your feet need to be removed. If you wear loose-fitting shoes because of calluses or corns, you can lose your balance and fall. Talk to your doctor if you have numbness in your feet. You may get dizzy if you do not drink enough water. To prevent dehydration, drink plenty of fluids. Choose water and other clear liquids. If you have kidney, heart, or liver disease and have to limit fluids, talk with your doctor before you increase the amount of fluids you drink.   Preventing falls at home  Remove raised doorway thresholds, throw rugs, and clutter. Repair loose carpet or raised areas in the floor. Move furniture and electrical cords to keep them out of walking paths. Use nonskid floor wax, and wipe up spills right away, especially on ceramic tile floors. If you use a walker or cane, put rubber tips on it. If you use crutches, clean the bottoms of them regularly with an abrasive pad, such as steel wool. Keep your house well lit, especially stairways, porches, and outside walkways. Use night-lights in areas such as hallways and bathrooms. Add extra light switches or use remote switches (such as switches that go on or off when you clap your hands) to make it easier to turn lights on if you have to get up during the night. Install sturdy handrails on stairways. Move items in your cabinets so that the things you use a lot are on the lower shelves (about waist level). Keep a cordless phone and a flashlight with new batteries by your bed. If possible, put a phone in each of the main rooms of your house, or carry a cell phone in case you fall and cannot reach a phone. Or, you can wear a device around your neck or wrist. You push a button that sends a signal for help. Wear low-heeled shoes that fit well and give your feet good support. Use footwear with nonskid soles. Check the heels and soles of your shoes for wear. Repair or replace worn heels or soles. Do not wear socks without shoes on smooth floors, such as wood. Walk on the grass when the sidewalks are slippery. If you live in an area that gets snow and ice in the winter, sprinkle salt on slippery steps and sidewalks. Or ask a family member or friend to do this for you. Preventing falls in the bath  Install grab bars and nonskid mats inside and outside your shower or tub and near the toilet and sinks. Use shower chairs and bath benches. Use a hand-held shower head that will allow you to sit while showering.   Get into a tub or shower by putting the weaker leg in first. Get out of a tub or shower with your strong side first.  Repair loose toilet seats and consider installing a raised toilet seat to make getting on and off the toilet easier. Keep your bathroom door unlocked while you are in the shower. Where can you learn more? Go to http://www.rainey.com/ and enter G117 to learn more about \"Preventing Falls: Care Instructions. \"  Current as of: May 4, 2022               Content Version: 13.5  © 5983-2196 Healthwise, [employer]. Care instructions adapted under license by [facility] ([facility]). If you have questions about a medical condition or this instruction, always ask your healthcare professional. [address] any warranty or liability for your use of this information. Hearing Loss: Care Instructions  Overview     Hearing loss is a sudden or slow decrease in how well you hear. It can range from mild to severe. Permanent hearing loss can occur with aging. It also can happen when you are exposed long-term to loud noise. Examples include listening to loud music, riding motorcycles, or being around other loud machines. Hearing loss can affect your work and home life. It can make you feel lonely or depressed. You may feel that you have lost your independence. But hearing aids and other devices can help you hear better and feel connected to others. Follow-up care is a key part of your treatment and safety. Be sure to make and go to all appointments, and call your doctor if you are having problems. It's also a good idea to know your test results and keep a list of the medicines you take. How can you care for yourself at home? Avoid loud noises whenever possible. This helps keep your hearing from getting worse. Always wear hearing protection around loud noises. Wear a hearing aid as directed. See a professional who can help you pick a hearing aid that fits you. Have hearing tests as your doctor suggests. They can show whether your hearing has changed. Your hearing aid may need to be adjusted. Use other devices as needed. These may include:  Telephone amplifiers and hearing aids that can connect to a television, stereo, radio, or microphone. Devices that use lights or vibrations. These alert you to the doorbell, a ringing telephone, or a baby monitor. Television closed-captioning. This shows the words at the bottom of the screen. Most new TVs can do this. TTY (text telephone). This lets you type messages back and forth on the telephone instead of talking or listening. These devices are also called TDD. When messages are typed on the keyboard, they are sent over the phone line to a receiving TTY. The message is shown on a monitor. Use text messaging, social media, and email if it is hard for you to communicate by telephone. Try to learn a listening technique called speechreading. It is not lipreading. You pay attention to people's gestures, expressions, posture, and tone of voice. These clues can help you understand what a person is saying. Face the person you are talking to, and have them face you. Make sure the lighting is good. You need to see the other person's face clearly. Think about counseling if you need help to adjust to your hearing loss. When should you call for help? Watch closely for changes in your health, and be sure to contact your doctor if:    You think your hearing is getting worse.     You have new symptoms, such as dizziness or nausea. Where can you learn more? Go to http://www.Bycler.com/ and enter R798 to learn more about \"Hearing Loss: Care Instructions. \"  Current as of: May 4, 2022               Content Version: 13.5  © 4962-5256 Healthwise, Incorporated. Care instructions adapted under license by [facility] ([facility]).  If you have questions about a medical condition or this instruction, always ask your healthcare professional. [place] [employer] any warranty or liability for your use of this information. Advance Directives: Care Instructions  Overview  An advance directive is a legal way to state your wishes at the end of your life. It tells your family and your doctor what to do if you can't say what you want. There are two main types of advance directives. You can change them any time your wishes change. Living will. This form tells your family and your doctor your wishes about life support and other treatment. The form is also called a declaration. Medical power of . This form lets you name a person to make treatment decisions for you when you can't speak for yourself. This person is called a health care agent (health care proxy, health care surrogate). The form is also called a durable power of  for health care. If you do not have an advance directive, decisions about your medical care may be made by a family member, or by a doctor or a  who doesn't know you. It may help to think of an advance directive as a gift to the people who care for you. If you have one, they won't have to make tough decisions by themselves. For more information, including forms for your state, see the [address] website (www.caringinfo.org/planning/advance-directives/). Follow-up care is a key part of your treatment and safety. Be sure to make and go to all appointments, and call your doctor if you are having problems. It's also a good idea to know your test results and keep a list of the medicines you take. What should you include in an advance directive? Many states have a unique advance directive form. (It may ask you to address specific issues.) Or you might use a universal form that's approved by many states. If your form doesn't tell you what to address, it may be hard to know what to include in your advance directive. Use the questions below to help you get started.   Who do you want to make decisions about your medical care if you are not able to? What life-support measures do you want if you have a serious illness that gets worse over time or can't be cured? What are you most afraid of that might happen? (Maybe you're afraid of having pain, losing your independence, or being kept alive by machines.)  Where would you prefer to die? (Your home? A hospital? A nursing home?)  Do you want to donate your organs when you die? Do you want certain Jain practices performed before you die? When should you call for help? Be sure to contact your doctor if you have any questions. Where can you learn more? Go to http://www.rainey.com/ and enter R264 to learn more about \"Advance Directives: Care Instructions. \"  Current as of: June 16, 2022               Content Version: 13.5  © 4186-8216 Healthwise, Incorporated. Care instructions adapted under license by [facility] ([facility]). If you have questions about a medical condition or this instruction, always ask your healthcare professional. [place] [address] any warranty or liability for your use of this information. Starting a Weight Loss Plan: Care Instructions  Overview     If you're thinking about losing weight, it can be hard to know where to start. Your doctor can help you set up a weight loss plan that best meets your needs. You may want to take a class on nutrition or exercise, or you could join a weight loss support group. If you have questions about how to make changes to your eating or exercise habits, ask your doctor about seeing a registered dietitian or an exercise specialist.  It can be a big challenge to lose weight. But you don't have to make huge changes at once. Make small changes, and stick with them. When those changes become habit, add a few more changes. If you don't think you're ready to make changes right now, try to pick a date in the future.  Make an appointment to see your doctor to discuss whether the time is right for you to start a plan.  Follow-up care is a key part of your treatment and safety. Be sure to make and go to all appointments, and call your doctor if you are having problems. It's also a good idea to know your test results and keep a list of the medicines you take. How can you care for yourself at home? Set realistic goals. Many people expect to lose much more weight than is likely. A weight loss of 5% to 10% of your body weight may be enough to improve your health. Get family and friends involved to provide support. Talk to them about why you are trying to lose weight, and ask them to help. They can help by participating in exercise and having meals with you, even if they may be eating something different. Find what works best for you. If you do not have time or do not like to cook, a program that offers meal replacement bars or shakes may be better for you. Or if you like to prepare meals, finding a plan that includes daily menus and recipes may be best.  Ask your doctor about other health professionals who can help you achieve your weight loss goals. A dietitian can help you make healthy changes in your diet. An exercise specialist or  can help you develop a safe and effective exercise program.  A counselor or psychiatrist can help you cope with issues such as depression, anxiety, or family problems that can make it hard to focus on weight loss. Consider joining a support group for people who are trying to lose weight. Your doctor can suggest groups in your area. Where can you learn more? Go to http://www.woods.com/ and enter U357 to learn more about \"Starting a Weight Loss Plan: Care Instructions. \"  Current as of: August 25, 2022               Content Version: 13.5  © 7378-2211 Healthwise, Incorporated. Care instructions adapted under license by [facility] ([facility]).  If you have questions about a medical condition or this instruction, always ask your healthcare professional. [employer], [facility] disclaims any warranty or liability for your use of this information. A Healthy Heart: Care Instructions  Your Care Instructions     Coronary artery disease, also called heart disease, occurs when a substance called plaque builds up in the vessels that supply oxygen-rich blood to your heart muscle. This can narrow the blood vessels and reduce blood flow. A heart attack happens when blood flow is completely blocked. A high-fat diet, smoking, and other factors increase the risk of heart disease. Your doctor has found that you have a chance of having heart disease. You can do lots of things to keep your heart healthy. It may not be easy, but you can change your diet, exercise more, and quit smoking. These steps really work to lower your chance of heart disease. Follow-up care is a key part of your treatment and safety. Be sure to make and go to all appointments, and call your doctor if you are having problems. It's also a good idea to know your test results and keep a list of the medicines you take. How can you care for yourself at home? Diet    Use less salt when you cook and eat. This helps lower your blood pressure. Taste food before salting. Add only a little salt when you think you need it. With time, your taste buds will adjust to less salt.     Eat fewer snack items, fast foods, canned soups, and other high-salt, high-fat, processed foods.     Read food labels and try to avoid saturated and trans fats. They increase your risk of heart disease by raising cholesterol levels.     Limit the amount of solid fat-butter, margarine, and shortening-you eat. Use olive, peanut, or canola oil when you cook. Bake, broil, and steam foods instead of frying them.     Eat a variety of fruit and vegetables every day. Dark green, deep orange, red, or yellow fruits and vegetables are especially good for you.  Examples include spinach, carrots, peaches, and berries.     Foods high in fiber can reduce your cholesterol and provide important vitamins and minerals. High-fiber foods include whole-grain cereals and breads, oatmeal, beans, brown rice, citrus fruits, and apples.     Eat lean proteins. Heart-healthy proteins include seafood, lean meats and poultry, eggs, beans, peas, nuts, seeds, and soy products.     Limit drinks and foods with added sugar. These include candy, desserts, and soda pop. Lifestyle changes    If your doctor recommends it, get more exercise. Walking is a good choice. Bit by bit, increase the amount you walk every day. Try for at least 30 minutes on most days of the week. You also may want to swim, bike, or do other activities.     Do not smoke. If you need help quitting, talk to your doctor about stop-smoking programs and medicines. These can increase your chances of quitting for good. Quitting smoking may be the most important step you can take to protect your heart. It is never too late to quit.     Limit alcohol to 2 drinks a day for men and 1 drink a day for women. Too much alcohol can cause health problems.     Manage other health problems such as diabetes, high blood pressure, and high cholesterol. If you think you may have a problem with alcohol or drug use, talk to your doctor. Medicines    Take your medicines exactly as prescribed. Call your doctor if you think you are having a problem with your medicine.     If your doctor recommends aspirin, take the amount directed each day. Make sure you take aspirin and not another kind of pain reliever, such as acetaminophen (Tylenol). When should you call for help? Call 911 if you have symptoms of a heart attack. These may include:    Chest pain or pressure, or a strange feeling in the chest.     Sweating.     Shortness of breath.     Pain, pressure, or a strange feeling in the back, neck, jaw, or upper belly or in one or both shoulders or arms.     Lightheadedness or sudden weakness.     A fast or irregular heartbeat.    After you call 911, the  may tell you to chew 1 adult-strength or 2 to 4 low-dose aspirin. Wait for an ambulance. Do not try to drive yourself. Watch closely for changes in your health, and be sure to contact your doctor if you have any problems. Where can you learn more? Go to http://www.rainey.com/ and enter F075 to learn more about \"A Healthy Heart: Care Instructions. \"  Current as of: September 7, 2022               Content Version: 13.5  © 4224-1125 [employer]EX[employer] MD. Care instructions adapted under license by [facility] ([facility]). If you have questions about a medical condition or this instruction, always ask your healthcare professional. [address] any warranty or liability for your use of this information. Personalized Preventive Plan for [address] - 3/6/2023  Medicare offers a range of preventive health benefits. Some of the tests and screenings are paid in full while other may be subject to a deductible, co-insurance, and/or copay. Some of these benefits include a comprehensive review of your medical history including lifestyle, illnesses that may run in your family, and various assessments and screenings as appropriate. After reviewing your medical record and screening and assessments performed today your provider may have ordered immunizations, labs, imaging, and/or referrals for you. A list of these orders (if applicable) as well as your Preventive Care list are included within your After Visit Summary for your review. Other Preventive Recommendations:    A preventive eye exam performed by an eye specialist is recommended every 1-2 years to screen for glaucoma; cataracts, macular degeneration, and other eye disorders. A preventive dental visit is recommended every 6 months. Try to get at least 150 minutes of exercise per week or 10,000 steps per day on a pedometer .   Order or download the FREE \"Exercise & Physical Activity: Your Everyday Guide\" from [employer] on [address] [facility]. Call 4-938.452.9886 or search [employer] online. You need 7696-3627 mg of calcium and 6148-5569 IU of vitamin D per day. It is possible to meet your calcium requirement with diet alone, but a vitamin D supplement is usually necessary to meet this goal.  When exposed to the sun, use a sunscreen that protects against both UVA and UVB radiation with an SPF of 30 or greater. Reapply every 2 to 3 hours or after sweating, drying off with a towel, or swimming. Always wear a seat belt when traveling in a car. Always wear a helmet when riding a bicycle or motorcycle.

## 2023-03-06 NOTE — PROGRESS NOTES
Evelyne Parekh  YOB: 1950    Date of Service:  3/6/2023    Chief Complaint:      Chief Complaint   Patient presents with    Medicare AWV    Hypertension    Hyperlipidemia    Diabetes       Assessment/Plan:  Lori Esparza was seen today for medicare awv, hypertension, hyperlipidemia and diabetes. Diagnoses and all orders for this visit:    Medicare annual wellness visit, subsequent    Controlled type 2 diabetes mellitus with diabetic autonomic neuropathy, without long-term current use of insulin (HCC)  -     POCT glycosylated hemoglobin (Hb A1C)  -     Microalbumin / Creatinine Urine Ratio  -     metFORMIN (GLUCOPHAGE) 1000 MG tablet; Take 1 tablet by mouth 2 times daily (with meals)    Essential hypertension  -     lisinopril (PRINIVIL;ZESTRIL) 20 MG tablet; Take 1 tablet by mouth daily  -     Comprehensive Metabolic Panel  -     CBC with Auto Differential    Mixed hyperlipidemia  -     Lipid Panel    Neuropathy due to type 2 diabetes mellitus (HCC)  Start gabapentin (NEURONTIN) 300 MG capsule; Take 1-2 capsule by mouth qhs    Controlled substance agreement signed  Start gabapentin (NEURONTIN) 300 MG capsule; Take 1-2 capsule by mouth qhs. Contracted tendon  Patient decline orthop      Return Neuropathy and BP 6/5. HPI:  Marifer Kern is a 67 y.o. He complains of tendon problems in his hands and feet for a few months and pain has resolve off Crestor. Treatment Adherence:  Medication compliance: compliant all of the time  Diet compliance: compliant most of the time  Weight trend: stable 230 since have not reduce portion size  Current exercise: remodeling house daily, walk 2-3x/week  Barriers: none  Diabetes Mellitus Type 2: Current symptoms/problems include numbness and tingling in both bottom of feet for a year. Trulicity 1.5 mcg q week and Metformin 1 g bid.    Home blood sugar records: fasting range: 110-135 AM and 100-130 PM  Any episodes of hypoglycemia? no  Eye exam current (within one year): yes  Tobacco history: He reports that he has never smoked. He has never used smokeless tobacco.  Daily Aspirin? Yes  Known diabetic complications: none  Hypertension: Home blood pressure monitoring: Yes - /55-68 on Atenolol 50 mg qAM and Lisinopril 20 mg qAM. He is adherent to a low sodium diet. Patient denies chest pain, shortness of breath, headache, lightheadedness, blurred vision, peripheral edema, palpitations, dry cough and fatigue. Antihypertensive medication side effects: no medication side effects noted. Use of agents associated with hypertension: none. Hyperlipidemia: having myalgia daily on Crestor 5 mg daily and pain resolve off medication. Myalgia resolve off simvastatin (Zocor) 20 mg qhs. Trying to be on a low fat diet. Gout in left toe and ankle: resolve since been on Allopurinol 300 mg qd. Hypogonadism: he got off the Androgel due to nipple tenderness and he has not felt more tired. ED: unchanged. able to have an erection a little easier but not much of a difference when he was on testosterone replacement.      Lab Results   Component Value Date    LABA1C 6.0 03/06/2023    LABA1C 5.9 10/24/2022    LABA1C 7.3 08/01/2022    LABMICR <1.20 03/02/2022     Lab Results   Component Value Date     03/02/2022    K 5.5 (H) 03/02/2022    CL 99 03/02/2022    CO2 23 03/02/2022    BUN 19 03/02/2022    CREATININE 1.1 03/02/2022    GLUCOSE 167 (H) 03/02/2022    CALCIUM 10.0 03/02/2022     Lab Results   Component Value Date/Time    CHOL 124 03/02/2022 10:34 AM    TRIG 202 03/02/2022 10:34 AM    HDL 50 03/02/2022 10:34 AM    HDL 44 05/08/2012 10:12 AM    LDLCALC 34 03/02/2022 10:34 AM     Lab Results   Component Value Date    ALT 27 03/02/2022    AST 27 03/02/2022     Lab Results   Component Value Date    TSH 2.01 08/23/2011    TSH 1.78 03/24/2010     Lab Results   Component Value Date    WBC 6.9 03/02/2022    HGB 14.3 03/02/2022    HCT 43.4 03/02/2022    MCV 97.2 03/02/2022     03/02/2022     Lab Results   Component Value Date    INR 1.04 12/28/2020      Lab Results   Component Value Date    PSA 0.45 02/22/2016    PSA 0.47 02/02/2015    PSA 0.52 02/26/2014      Lab Results   Component Value Date    LABURIC 3.9 08/31/2022        Patient Active Problem List   Diagnosis    Essential hypertension    Controlled type 2 diabetes mellitus with diabetic autonomic neuropathy, without long-term current use of insulin ([facility] [address]    ED (erectile dysfunction)    Hypogonadism male    Lipodermatosclerosis    Mixed hyperlipidemia    Class 2 severe obesity with body mass index (BMI) of 35 to 39.9 with serious comorbidity (HCC)    Chronic gout of left foot    History of tobacco abuse       Allergies   Allergen Reactions    Augmentin [Amoxicillin-Pot Clavulanate]      Stomach pain with n/v    Bactrim [Sulfamethoxazole-Trimethoprim]      SOB and diaphorectic    Simvastatin      Myalgia    Sulfa Antibiotics      GI upset     Outpatient Medications Marked as Taking for the 3/6/23 encounter (Office Visit) with Alexa Espinal MD   Medication Sig Dispense Refill    allopurinol (ZYLOPRIM) 300 MG tablet TAKE 1 TABLET BY MOUTH DAILY 90 tablet 3    metFORMIN (GLUCOPHAGE) 1000 MG tablet Take 1 tablet by mouth 2 times daily (with meals) 180 tablet 0    dulaglutide (TRULICITY) 1.5 WL/2.9BS SC injection Inject 0.5 mLs into the skin once a week 4 Adjustable Dose Pre-filled Pen Syringe 2    lisinopril (PRINIVIL;ZESTRIL) 20 MG tablet Take 1 tablet by mouth daily 90 tablet 3    atenolol (TENORMIN) 50 MG tablet Take 1 tablet by mouth daily 90 tablet 3    rosuvastatin (CRESTOR) 5 MG tablet Take 1 tablet by mouth nightly 90 tablet 3    clindamycin (CLEOCIN) 300 MG capsule Take 600 mg by mouth See Admin Instructions Prior to dental procedures only      fluocinonide (LIDEX) 0.05 % ointment Apply topically daily Indications: 30 gm Apply topically 2 times daily.        Compression Stockings MISC by Does not apply route 1 each 1    aspirin 81 MG EC tablet Take 81 mg by mouth daily. Review of Systems: 14 systems were negative except of what was stated on HPI    Nursing note and vitals reviewed. Vitals:    03/06/23 0954   BP: 120/64   Pulse: 75   SpO2: 99%   Weight: 222 lb (100.7 kg)   Height: 5' 8\" (1.727 m)     Wt Readings from Last 3 Encounters:   03/06/23 222 lb (100.7 kg)   10/24/22 228 lb (103.4 kg)   08/01/22 236 lb (107 kg)     BP Readings from Last 3 Encounters:   03/06/23 120/64   10/24/22 (!) 102/58   08/01/22 118/62     Body mass index is 33.75 kg/m². Constitutional: Patient appears well-developed and well-nourished. No distress. Head: Normocephalic and atraumatic. Neck: Normal range of motion. Neck supple. No thyroidmegaly. Cardiovascular: Normal rate, regular rhythm, normal heart sounds and intact distal pulses. Pulmonary/Chest: Effort normal and breath sounds normal. No stridor. No respiratory distress. No wheezes and no rales. Abdominal: Soft. Bowel sounds are normal. No distension and no mass. No tenderness. No rebound and no guarding. Musculoskeletal: No edema and no tenderness. Skin: No rash or erythema.   Palm with contracted tendon

## 2023-03-06 NOTE — PROGRESS NOTES
Medicare Annual Wellness Visit    Greg Parekh is here for Medicare AWV, Hypertension, Hyperlipidemia, and Diabetes    Assessment & Plan   Controlled type 2 diabetes mellitus with diabetic autonomic neuropathy, without long-term current use of insulin (HCC)  -     POCT glycosylated hemoglobin (Hb A1C)  Medicare annual wellness visit, subsequent      Recommendations for Preventive Services Due: see orders and patient instructions/AVS.  Recommended screening schedule for the next 5-10 years is provided to the patient in written form: see Patient Instructions/AVS.     Return for Medicare Annual Wellness Visit in 1 year.     Subjective       Patient's complete Health Risk Assessment and screening values have been reviewed and are found in Flowsheets. The following problems were reviewed today and where indicated follow up appointments were made and/or referrals ordered.    Positive Risk Factor Screenings with Interventions:    Fall Risk:  Do you feel unsteady or are you worried about falling? : (!) yes (doesn't use cane or walker)  2 or more falls in past year?: (!) yes (feels unsteady- loss of balance)  Fall with injury in past year?: no     Interventions:                  Weight and Activity:  Physical Activity: Insufficiently Active    Days of Exercise per Week: 1 day    Minutes of Exercise per Session: 40 min     On average, how many days per week do you engage in moderate to strenuous exercise (like a brisk walk)?: 1 day  Have you lost any weight without trying in the past 3 months?: No  Body mass index: (!) 33.75    Obesity Interventions:             Hearing Screen:  Do you or your family notice any trouble with your hearing that hasn't been managed with hearing aids?: (!) Yes (hearing is muffled)    Interventions:  Not want hearing aids right now                       Objective   Vitals:    03/06/23 0954   BP: 120/64   Pulse: 75   SpO2: 99%   Weight: 222 lb (100.7 kg)   Height: 5' 8\" (1.727 m)      Body mass  index is 33.75 kg/m². Allergies   Allergen Reactions    Augmentin [Amoxicillin-Pot Clavulanate]      Stomach pain with n/v    Bactrim [Sulfamethoxazole-Trimethoprim]      SOB and diaphorectic    Simvastatin      Myalgia    Sulfa Antibiotics      GI upset     Prior to Visit Medications    Medication Sig Taking? Authorizing Provider   allopurinol (ZYLOPRIM) 300 MG tablet TAKE 1 TABLET BY MOUTH DAILY Yes Bart Espinal MD   metFORMIN (GLUCOPHAGE) 1000 MG tablet Take 1 tablet by mouth 2 times daily (with meals) Yes Bart Espinal MD   dulaglutide (TRULICITY) 1.5 IQ/3.0GH SC injection Inject 0.5 mLs into the skin once a week Yes Bart Espinal MD   lisinopril (PRINIVIL;ZESTRIL) 20 MG tablet Take 1 tablet by mouth daily Yes Bart Espinal MD   atenolol (TENORMIN) 50 MG tablet Take 1 tablet by mouth daily Yes Bart Espinal MD   rosuvastatin (CRESTOR) 5 MG tablet Take 1 tablet by mouth nightly Yes Bart Espinal MD   clindamycin (CLEOCIN) 300 MG capsule Take 600 mg by mouth See Admin Instructions Prior to dental procedures only Yes Historical Provider, MD   fluocinonide (LIDEX) 0.05 % ointment Apply topically daily Indications: 30 gm Apply topically 2 times daily. Yes Historical Provider, MD   Compression Stockings MISC by Does not apply route Yes Bart Espinal MD   aspirin 81 MG EC tablet Take 81 mg by mouth daily.  Yes Historical Provider, MD   ONE TOUCH ULTRA TEST strip TEST EVERY DAY  Bart Espinal MD       [facility] (Including outside providers/suppliers regularly involved in providing care):   Patient Care Team:  Kate Lao MD as PCP - General (Internal Medicine)  Kate Lao MD as PCP - Empaneled Provider     Reviewed and updated this visit:  Tobacco  Allergies  Meds  Problems  Med Hx  Surg Hx  Soc Hx  Fam Hx               BART ESPINAL MD

## 2023-03-06 NOTE — LETTER
CONTROLLED SUBSTANCE MEDICATION AGREEMENT     Patient Name: Jessika Parekh  Patient YOB: 1950   I understand, that controlled substance medications may be used to help better manage my symptoms and to improve my ability to function at home, work and in social settings. However, I also understand that these medications do have risks, which have been discussed with me, including possible development of physical or psychological dependence. I understand that successful treatment requires mutual trust and honesty between me and my provider. I understand and agree that following this Medication Agreement is necessary in continuing my provider-patient relationship and the success of my treatment plan. Explanation from my Provider: Benefits and Goals of Controlled Substance Medications: There are two potential goals for your treatment: (1) decreased pain and suffering (2) improved daily life functions. There are many possible treatments for your chronic condition(s). Alternatives such as physical therapy, yoga, massage, home daily exercise, meditation, relaxation techniques, injections, chiropractic manipulations, surgery, cognitive therapy, hypnosis and many medications that are not habit-forming may be used. Use of controlled substance medications may be helpful, but they are unlikely to resolve all symptoms or restore all function. Explanation from my Provider: Risks of Controlled Substance Medications:  Opioid pain medications: These medications can lead to problems such as addiction/dependence, sedation, lightheadedness/dizziness, memory issues, falls, constipation, nausea, or vomiting. They may also impair the ability to drive or operate machinery. Additionally, these medications may lower testosterone levels, leading to loss of bone strength, stamina and sex drive.   They may cause problems with breathing, sleep apnea and reduced coughing, which is especially dangerous for patients with lung disease. Overdose or dangerous interactions with alcohol and other medications may occur, leading to death. Hyperalgesia may develop, which means patients receiving opioids for the treatment of pain may become more sensitive to certain painful stimuli, and in some cases, experience pain from ordinarily non-painful stimuli. Women between the ages of 14-53 who could become pregnant should carefully weigh the risks and benefits of opioids with their physicians, as these medications increase the risk of pregnancy complications, including miscarriage,  delivery and stillbirth. It is also possible for babies to be born addicted to opioids. Opioid dependence withdrawal symptoms may include; feelings of uneasiness, increased pain, irritability, belly pain, diarrhea, sweats and goose-flesh. Benzodiazepines and non-benzodiazepine sleep medications: These medications can lead to problems such as addiction/dependence, sedation, fatigue, lightheadedness, dizziness, incoordination, falls, depression, hallucinations, and impaired judgment, memory and concentration. The ability to drive and operate machinery may also be affected. Abnormal sleep-related behaviors have been reported, including sleepwalking, driving, making telephone calls, eating, or having sex while not fully awake. These medications can suppress breathing and worsen sleep apnea, particularly when combined with alcohol or other sedating medications, potentially leading to death. Dependence withdrawal symptoms may include tremors, anxiety, hallucinations and seizures. Stimulants:  Common adverse effects include addiction/dependence, increased blood  pressure and heart rate, decreased appetite, nausea, involuntary weight loss, insomnia,                                                                                                                     Initials:_______   irritability, and headaches.   These risks may increase when these medications are combined with other stimulants, such as caffeine pills or energy drinks, certain weight loss supplements and oral decongestants. Dependence withdrawal symptoms may include depressed mood, loss of interest, suicidal thoughts, anxiety, fatigue, appetite changes and agitation. Testosterone replacement therapy:  Potential side effects include increased risk of stroke and heart attack, blood clots, increased blood pressure, increased cholesterol, enlarged prostate, sleep apnea, irritability/aggression and other mood disorders, and decreased fertility. I agree and understand that I and my prescriber have the following rights and responsibilities regarding my treatment plan:     1. MY RIGHTS:  To be informed of my treatment and medication plan. To be an active participant in my health and wellbeing. 2. MY RESPONSIBILITY AND UNDERSTANDING FOR USE OF MEDICATIONS   I will take medications at the dose and frequency as directed. For my safety, I will not increase or change how I take my medications without the recommendation of my healthcare provider.  I will actively participate in any program recommended by my provider which may improve function, including social, physical, psychological programs.  I will not take my medications with alcohol or other drugs not prescribed to me. I understand that drinking alcohol with my medications increases the chances of side effects, including reduced breathing rate and could lead to personal injury when operating machinery.  I understand that if I have a history of substance use disorders, including alcohol or other illicit drugs, that I may be at increased risk of addiction to my medications.  I agree to notify my provider immediately if I should become pregnant so that my treatment plan can be adjusted.    I agree and understand that I shall only receive controlled substance medications from the prescriber that signed this agreement unless there is written agreement among other prescribers of controlled substances outlining the responsibility of the medications being prescribed.  I understand that the if the controlled medication is not helping to achieve goals, the dosage may be tapered and no longer prescribed. 3. MY RESPONSIBILITY FOR COMMUNICATION / PRESCRIPTION RENEWALS   I agree that all controlled substance medications that I take will be prescribed only by my provider. If another healthcare provider prescribes me medication in an emergency, I will notify my provider within seventy-two (72) hours.  I will arrange for refills at the prescribed interval ONLY during regular office hours. I will not ask for refills earlier than agreed, after-hours, on holidays or weekends. Refills may take up to 72 hours for processing and prescriptions to reach the pharmacy.  I will inform my other health care providers that I am taking these medications and of the existence of this [id number]. In the event of an emergency, I will provide the same information to the emergency department prescribers.  I will keep my provider updated on the pharmacy I am using for controlled medication prescription filling. Initials:_______  4. MY RESPONSIBILITY FOR PROTECTING MEDICATIONS   I will protect my prescriptions and medications. I understand that lost or misplaced prescriptions will not be replaced.  I will keep medications only for my own use and will not share them with others. I will keep all medications away from children.  I agree that if my medications are adjusted or discontinued, I will properly dispose of any remaining medications. I understand that I will be required to dispose of any remaining controlled medications as, directed by my prescriber, prior to being provided with any prescriptions for other controlled medications.   Medication drop box locations can be found at: [email or web address]    5. MY RESPONSIBILITY WITH ILLEGAL DRUGS    I will not use illegal or street drugs or another person's prescription medications not prescribed to me.  If there are identified addiction type symptoms, then referral to a program may be provided by my provider and I agree to follow through with this recommendation. 6. MY RESPONSIBILITY FOR COOPERATION WITH INVESTIGATIONS   I understand that my provider will comply with any applicable law and may discuss my use and/or possible misuse/abuse of controlled substances and alcohol, as appropriate, with any health care provider involved in my care, pharmacist, or legal authority.  I authorize my provider and pharmacy to cooperate fully with law enforcement agencies (as permitted by law) in the investigation of any possible misuse, sale, or other diversion of my controlled substances.  I agree to waive any applicable privilege or right of privacy or confidentiality with respect to these authorizations. 7. PROVIDERS RIGHT TO MONITOR FOR SAFETY: PRESCRIPTION MONITORING / DRUG TESTING   I consent to drug/toxicology screening and will submit to a drug screen upon my providers request to assure I am only taking the prescribed drugs for my safety monitoring. I understand that a drug screen is a laboratory test in which a sample of my urine, blood or saliva is checked to see what drugs I have been taking. This may entail an observed urine specimen, which means that a nurse or other health care provider may watch me provide urine, and I will cooperate if I am asked to provide an observed specimen.  I understand that my provider will check a copy of my State Prescription Monitoring Program () Report in order to safely prescribe medications.  Pill Counts: I consent to pill counts when requested.   I may be asked to bring all my prescribed controlled substance medications, in their original bottles, to all of my scheduled appointments. In addition, my provider may ask me to come to the practice at any time for a random pill count. 8. TERMINATION OF THIS AGREEMENT  For my safety, my prescriber has the right to stop prescribing controlled substance medications and may end this agreement. Initials:_______   Conditions that may result in termination of this agreement:  a. I do not show any improvement in pain, or my activity has not improved. b. I develop rapid tolerance or loss of improvement, as described in my treatment plan.  c. I develop significant side effects from the medication. d. My behavior is not consistent with the responsibilities outlined above, thereby causing safety concerns to continue prescribing controlled substance medications. e. I fail to follow the terms of this agreement. f. Other:____________________________       UNDERSTANDING THIS MEDICATION AGREEMENT:    I have read the above and have had all my questions answered. For chronic disease management, I know that my symptoms can be managed with many types of treatments. A chronic medication trial may be part of my treatment, but I must be an active participant in my care. Medication therapy is only one part of my symptom management plan. In some cases, there may be limited scientific evidence to support the chronic use of certain medications to improve symptoms and daily function. Furthermore, in certain circumstances, there may be scientific information that suggests that the use of chronic controlled substances may worsen my symptoms and increase my risk of unintentional death directly related to this medication therapy. I know that if my provider feels my risk from controlled medications is greater than my benefit, I will have my controlled substance medication(s) compassionately lowered or removed altogether.      I further agree to allow this office to contact my HIPAA contact if there are concerns about my safety and use of the controlled medications. I have agreed to use the prescribed controlled substance medications to me as instructed by my provider and as stated in this Medication Agreement. My initial on each page and my signature below shows that I have read each page and I have had the opportunity to ask questions with answers provided by my provider.     Patient Name (Printed): _____________________________________    Patient Signature:  ______________________   Date: _____________    Prescriber Name (Printed): Mylene Barriga MD    Prescriber Signature:  Date:3/6/2023

## 2023-03-07 LAB
A/G RATIO: 1.8 (ref 1.1–2.2)
ALBUMIN SERPL-MCNC: 4.6 G/DL (ref 3.4–5)
ALP BLD-CCNC: 60 U/L (ref 40–129)
ALT SERPL-CCNC: 18 U/L (ref 10–40)
ANION GAP SERPL CALCULATED.3IONS-SCNC: 16 MMOL/L (ref 3–16)
AST SERPL-CCNC: 20 U/L (ref 15–37)
BASOPHILS ABSOLUTE: 0.1 K/UL (ref 0–0.2)
BASOPHILS RELATIVE PERCENT: 1.2 %
BILIRUB SERPL-MCNC: 0.5 MG/DL (ref 0–1)
BUN BLDV-MCNC: 24 MG/DL (ref 7–20)
CALCIUM SERPL-MCNC: 9.9 MG/DL (ref 8.3–10.6)
CHLORIDE BLD-SCNC: 101 MMOL/L (ref 99–110)
CHOLESTEROL, TOTAL: 181 MG/DL (ref 0–199)
CO2: 23 MMOL/L (ref 21–32)
CREAT SERPL-MCNC: 1.2 MG/DL (ref 0.8–1.3)
CREATININE URINE: 92.8 MG/DL (ref 39–259)
EOSINOPHILS ABSOLUTE: 0.1 K/UL (ref 0–0.6)
EOSINOPHILS RELATIVE PERCENT: 1.8 %
GFR SERPL CREATININE-BSD FRML MDRD: >60 ML/MIN/{1.73_M2}
GLUCOSE BLD-MCNC: 109 MG/DL (ref 70–99)
HCT VFR BLD CALC: 41.3 % (ref 40.5–52.5)
HDLC SERPL-MCNC: 49 MG/DL (ref 40–60)
HEMOGLOBIN: 13.3 G/DL (ref 13.5–17.5)
LDL CHOLESTEROL CALCULATED: 105 MG/DL
LYMPHOCYTES ABSOLUTE: 2 K/UL (ref 1–5.1)
LYMPHOCYTES RELATIVE PERCENT: 34.7 %
MCH RBC QN AUTO: 32 PG (ref 26–34)
MCHC RBC AUTO-ENTMCNC: 32.2 G/DL (ref 31–36)
MCV RBC AUTO: 99.6 FL (ref 80–100)
MICROALBUMIN UR-MCNC: <1.2 MG/DL
MICROALBUMIN/CREAT UR-RTO: NORMAL MG/G (ref 0–30)
MONOCYTES ABSOLUTE: 0.4 K/UL (ref 0–1.3)
MONOCYTES RELATIVE PERCENT: 6.9 %
NEUTROPHILS ABSOLUTE: 3.2 K/UL (ref 1.7–7.7)
NEUTROPHILS RELATIVE PERCENT: 55.4 %
PDW BLD-RTO: 14.8 % (ref 12.4–15.4)
PLATELET # BLD: 152 K/UL (ref 135–450)
PMV BLD AUTO: 10.6 FL (ref 5–10.5)
POTASSIUM SERPL-SCNC: 4.9 MMOL/L (ref 3.5–5.1)
RBC # BLD: 4.14 M/UL (ref 4.2–5.9)
SODIUM BLD-SCNC: 140 MMOL/L (ref 136–145)
TOTAL PROTEIN: 7.1 G/DL (ref 6.4–8.2)
TRIGL SERPL-MCNC: 137 MG/DL (ref 0–150)
VLDLC SERPL CALC-MCNC: 27 MG/DL
WBC # BLD: 5.8 K/UL (ref 4–11)

## 2023-03-08 ENCOUNTER — APPOINTMENT (OUTPATIENT)
Dept: CT IMAGING | Age: 73
End: 2023-03-08
Payer: MEDICARE

## 2023-03-08 ENCOUNTER — HOSPITAL ENCOUNTER (EMERGENCY)
Age: 73
Discharge: HOME | End: 2023-03-08
Payer: MEDICARE

## 2023-03-08 VITALS
OXYGEN SATURATION: 94 % | RESPIRATION RATE: 18 BRPM | SYSTOLIC BLOOD PRESSURE: 111 MMHG | HEART RATE: 83 BPM | DIASTOLIC BLOOD PRESSURE: 78 MMHG | TEMPERATURE: 98.2 F

## 2023-03-08 DIAGNOSIS — S01.81XA FACIAL LACERATION, INITIAL ENCOUNTER: Primary | ICD-10-CM

## 2023-03-08 PROCEDURE — 70486 CT MAXILLOFACIAL W/O DYE: CPT

## 2023-03-08 PROCEDURE — 90715 TDAP VACCINE 7 YRS/> IM: CPT | Performed by: NURSE PRACTITIONER

## 2023-03-08 PROCEDURE — 6360000002 HC RX W HCPCS: Performed by: NURSE PRACTITIONER

## 2023-03-08 PROCEDURE — 70450 CT HEAD/BRAIN W/O DYE: CPT

## 2023-03-08 PROCEDURE — 90471 IMMUNIZATION ADMIN: CPT | Performed by: NURSE PRACTITIONER

## 2023-03-08 PROCEDURE — 99284 EMERGENCY DEPT VISIT MOD MDM: CPT

## 2023-03-08 PROCEDURE — 6370000000 HC RX 637 (ALT 250 FOR IP): Performed by: NURSE PRACTITIONER

## 2023-03-08 RX ORDER — BACITRACIN, NEOMYCIN, POLYMYXIN B 400; 3.5; 5 [USP'U]/G; MG/G; [USP'U]/G
OINTMENT TOPICAL ONCE
Status: COMPLETED | OUTPATIENT
Start: 2023-03-08 | End: 2023-03-08

## 2023-03-08 RX ADMIN — POLYMYXIN B SULFATE, BACITRACIN ZINC, NEOMYCIN SULFATE: 5000; 3.5; 4 OINTMENT TOPICAL at 22:41

## 2023-03-08 RX ADMIN — TETANUS TOXOID, REDUCED DIPHTHERIA TOXOID AND ACELLULAR PERTUSSIS VACCINE, ADSORBED 0.5 ML: 5; 2.5; 8; 8; 2.5 SUSPENSION INTRAMUSCULAR at 20:23

## 2023-03-08 ASSESSMENT — PAIN SCALES - GENERAL
PAINLEVEL_OUTOF10: 0
PAINLEVEL_OUTOF10: 0
PAINLEVEL_OUTOF10: 8

## 2023-03-08 ASSESSMENT — PAIN - FUNCTIONAL ASSESSMENT
PAIN_FUNCTIONAL_ASSESSMENT: NONE - DENIES PAIN
PAIN_FUNCTIONAL_ASSESSMENT: 0-10

## 2023-03-08 ASSESSMENT — PAIN DESCRIPTION - LOCATION: LOCATION: FACE

## 2023-03-08 ASSESSMENT — PAIN DESCRIPTION - ORIENTATION: ORIENTATION: RIGHT

## 2023-03-09 ASSESSMENT — ENCOUNTER SYMPTOMS
COUGH: 0
BACK PAIN: 0
VOMITING: 0
SHORTNESS OF BREATH: 0
RHINORRHEA: 0
ABDOMINAL PAIN: 0
NAUSEA: 0
EYE PAIN: 0
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
## 2023-03-16 ENCOUNTER — OFFICE VISIT (OUTPATIENT)
Dept: INTERNAL MEDICINE CLINIC | Age: 73
End: 2023-03-16

## 2023-03-16 VITALS
SYSTOLIC BLOOD PRESSURE: 112 MMHG | OXYGEN SATURATION: 96 % | HEIGHT: 68 IN | DIASTOLIC BLOOD PRESSURE: 60 MMHG | HEART RATE: 84 BPM | BODY MASS INDEX: 33.75 KG/M2

## 2023-03-16 DIAGNOSIS — S01.81XS FACIAL LACERATION, SEQUELA: Primary | ICD-10-CM
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
## 2023-03-20 ENCOUNTER — OFFICE VISIT (OUTPATIENT)
Dept: INTERNAL MEDICINE CLINIC | Age: 73
End: 2023-03-20
Payer: MEDICARE

## 2023-03-20 VITALS
OXYGEN SATURATION: 98 % | SYSTOLIC BLOOD PRESSURE: 140 MMHG | HEART RATE: 65 BPM | DIASTOLIC BLOOD PRESSURE: 70 MMHG | WEIGHT: 222 LBS | HEIGHT: 68 IN | BODY MASS INDEX: 33.65 KG/M2

## 2023-03-20 DIAGNOSIS — J31.0 CHRONIC RHINITIS: ICD-10-CM

## 2023-03-20 DIAGNOSIS — H81.13 BPV (BENIGN POSITIONAL VERTIGO), BILATERAL: Primary | ICD-10-CM

## 2023-03-20 PROCEDURE — 3077F SYST BP >= 140 MM HG: CPT | Performed by: INTERNAL MEDICINE

## 2023-03-20 PROCEDURE — G8484 FLU IMMUNIZE NO ADMIN: HCPCS | Performed by: INTERNAL MEDICINE

## 2023-03-20 PROCEDURE — 1123F ACP DISCUSS/DSCN MKR DOCD: CPT | Performed by: INTERNAL MEDICINE

## 2023-03-20 PROCEDURE — G8417 CALC BMI ABV UP PARAM F/U: HCPCS | Performed by: INTERNAL MEDICINE

## 2023-03-20 PROCEDURE — G8427 DOCREV CUR MEDS BY ELIG CLIN: HCPCS | Performed by: INTERNAL MEDICINE

## 2023-03-20 PROCEDURE — 3078F DIAST BP <80 MM HG: CPT | Performed by: INTERNAL MEDICINE

## 2023-03-20 PROCEDURE — 3017F COLORECTAL CA SCREEN DOC REV: CPT | Performed by: INTERNAL MEDICINE

## 2023-03-20 PROCEDURE — 1036F TOBACCO NON-USER: CPT | Performed by: INTERNAL MEDICINE

## 2023-03-20 PROCEDURE — 99213 OFFICE O/P EST LOW 20 MIN: CPT | Performed by: INTERNAL MEDICINE

## 2023-03-20 RX ORDER — FLUTICASONE PROPIONATE 50 MCG
2 SPRAY, SUSPENSION (ML) NASAL DAILY
Qty: 3 EACH | Refills: 3 | Status: SHIPPED | OUTPATIENT
Start: 2023-03-20

## 2023-03-20 NOTE — PROGRESS NOTES
Galileo Cornelius Parekh  YOB: 1950    Date of Service:  3/20/2023    Chief Complaint:      Chief Complaint   Patient presents with    Otalgia     Right ear x 2 days     Dizziness       Assessment/Plan:  Sarah Apodaca was seen today for otalgia and dizziness. Diagnoses and all orders for this visit:    BPV (benign positional vertigo), bilateral  Epley maneuver was perform on the patient and patient is instructed on repeating the Epley maneuver daily in the morning and not lay back down for a few hours after doing it until patient has gone 24 hours without dizziness. Patient is to avoid turning his/her head quickly which could reproduce the dizziness. Try to turn his/her body instead of his/her head until the dizziness resolves. Chronic rhinitis  Restart fluticasone (FLONASE) 50 MCG/ACT nasal spray; 2 sprays by Each Nostril route daily    Return if symptoms worsen or fail to improve. HPI:  Davy Solano is a 68 y.o. He woke up yesterday morning with dizziness. He does feel a little stuffy. He denies fever or chills. He has been having chronic rhinitis and used to be on Flonase which helped.     Lab Results   Component Value Date    LABA1C 6.0 03/06/2023    LABA1C 5.9 10/24/2022    LABA1C 7.3 08/01/2022    LABMICR <1.20 03/06/2023     Lab Results   Component Value Date     03/06/2023    K 4.9 03/06/2023     03/06/2023    CO2 23 03/06/2023    BUN 24 (H) 03/06/2023    CREATININE 1.2 03/06/2023    GLUCOSE 109 (H) 03/06/2023    CALCIUM 9.9 03/06/2023     Lab Results   Component Value Date/Time    CHOL 181 03/06/2023 10:36 AM    TRIG 137 03/06/2023 10:36 AM    HDL 49 03/06/2023 10:36 AM    HDL 44 05/08/2012 10:12 AM    LDLCALC 105 03/06/2023 10:36 AM     Lab Results   Component Value Date    ALT 18 03/06/2023    AST 20 03/06/2023     Lab Results   Component Value Date    TSH 2.01 08/23/2011    TSH 1.78 03/24/2010     Lab Results   Component Value Date    WBC 5.8 03/06/2023    HGB 13.3 (L)

## 2023-05-30 ENCOUNTER — TELEPHONE (OUTPATIENT)
Dept: INTERNAL MEDICINE CLINIC | Age: 73
End: 2023-05-30

## 2023-05-30 NOTE — TELEPHONE ENCOUNTER
Received request from patient for records release to Department of [facility] on 05/30/2023.  Faxed to [phone] for processing on 05/30/2023

## 2023-06-05 ENCOUNTER — OFFICE VISIT (OUTPATIENT)
Dept: INTERNAL MEDICINE CLINIC | Age: 73
End: 2023-06-05

## 2023-06-05 VITALS
HEIGHT: 68 IN | HEART RATE: 81 BPM | BODY MASS INDEX: 33.65 KG/M2 | DIASTOLIC BLOOD PRESSURE: 58 MMHG | WEIGHT: 222 LBS | OXYGEN SATURATION: 95 % | SYSTOLIC BLOOD PRESSURE: 110 MMHG

## 2023-06-05 DIAGNOSIS — E66.01 CLASS 2 SEVERE OBESITY WITH BODY MASS INDEX (BMI) OF 35 TO 39.9 WITH SERIOUS COMORBIDITY (HCC): ICD-10-CM

## 2023-06-05 DIAGNOSIS — I10 ESSENTIAL HYPERTENSION: ICD-10-CM

## 2023-06-05 DIAGNOSIS — E11.43 CONTROLLED TYPE 2 DIABETES MELLITUS WITH DIABETIC AUTONOMIC NEUROPATHY, WITHOUT LONG-TERM CURRENT USE OF INSULIN (HCC): Primary | ICD-10-CM

## 2023-06-05 DIAGNOSIS — E11.40 NEUROPATHY DUE TO TYPE 2 DIABETES MELLITUS (HCC): ICD-10-CM

## 2023-06-05 DIAGNOSIS — E78.2 MIXED HYPERLIPIDEMIA: ICD-10-CM

## 2023-06-05 LAB — HBA1C MFR BLD: 6.6 %

## 2023-06-05 NOTE — PROGRESS NOTES
06/05/23 222 lb (100.7 kg)   03/20/23 222 lb (100.7 kg)   03/06/23 222 lb (100.7 kg)     BP Readings from Last 3 Encounters:   06/05/23 (!) 110/58   03/20/23 (!) 140/70   03/16/23 112/60     Body mass index is 33.75 kg/m². Constitutional: Patient appears well-developed and well-nourished. No distress. Head: Normocephalic and atraumatic. Neck: Normal range of motion. Neck supple. No thyroidmegaly. Cardiovascular: Normal rate, regular rhythm, normal heart sounds and intact distal pulses. Pulmonary/Chest: Effort normal and breath sounds normal. No stridor. No respiratory distress. No wheezes and no rales. Abdominal: Soft. Bowel sounds are normal. No distension and no mass. No tenderness. No rebound and no guarding. Musculoskeletal: No edema and no tenderness. Skin: No rash or erythema.

## 2023-06-06 RX ORDER — TIRZEPATIDE 10 MG/.5ML
10 INJECTION, SOLUTION SUBCUTANEOUS WEEKLY
Qty: 4 ADJUSTABLE DOSE PRE-FILLED PEN SYRINGE | Refills: 0 | Status: SHIPPED | OUTPATIENT
Start: 2023-06-06

## 2023-06-07 ENCOUNTER — TELEPHONE (OUTPATIENT)
Dept: ADMINISTRATIVE | Age: 73
End: 2023-06-07

## 2023-06-07 NOTE — TELEPHONE ENCOUNTER
Submitted PA for [facility]  [facility]: [id number] STATUS: APPROVED FROM 5/8/23-6/6/24. Follow up done daily; if no response in three days we will refax for status check. If another three days goes by with no response we will call the insurance for status.

## 2023-07-11 ENCOUNTER — TELEMEDICINE (OUTPATIENT)
Dept: INTERNAL MEDICINE CLINIC | Age: 73
End: 2023-07-11

## 2023-07-11 DIAGNOSIS — E66.01 MORBIDLY OBESE (HCC): ICD-10-CM

## 2023-07-11 DIAGNOSIS — E11.40 NEUROPATHY DUE TO TYPE 2 DIABETES MELLITUS (HCC): Primary | ICD-10-CM

## 2023-07-11 DIAGNOSIS — E11.43 CONTROLLED TYPE 2 DIABETES MELLITUS WITH DIABETIC AUTONOMIC NEUROPATHY, WITHOUT LONG-TERM CURRENT USE OF INSULIN (HCC): ICD-10-CM

## 2023-07-11 DIAGNOSIS — I10 ESSENTIAL HYPERTENSION: ICD-10-CM

## 2023-07-11 DIAGNOSIS — Z79.899 CONTROLLED SUBSTANCE AGREEMENT SIGNED: ICD-10-CM

## 2023-07-11 RX ORDER — GABAPENTIN 600 MG/1
600 TABLET ORAL 3 TIMES DAILY
Qty: 270 TABLET | Refills: 0 | Status: SHIPPED | OUTPATIENT
Start: 2023-07-11 | End: 2023-10-09

## 2023-07-11 NOTE — PROGRESS NOTES
Patient was evaluated through a synchronous (real-time) video encounter. Patient identification was verified at the start of the visit. She (or guardian if applicable) is aware that this is a billable service, which includes applicable co-pays. This visit was conducted with the patient's (and/or legal guardian's) verbal consent. She has not had a related appointment within my department in the past 7 days or scheduled within the next 24 hours. The patient was located at Home: [address] [place] [address]. The provider was located at Home ([address] [place] [address]): [place]. Date of Service:  7/11/2023    Chief Complaint:      Chief Complaint   Patient presents with    Hypertension    Diabetes    Peripheral Neuropathy       Assessment/Plan:    Cedrick Jamil was seen today for hypertension, diabetes and peripheral neuropathy. Diagnoses and all orders for this visit:    Neuropathy due to type 2 diabetes mellitus (HCC)  -     gabapentin (NEURONTIN) 600 MG tablet; Take 1 tablet by mouth 3 times daily for 90 days. Controlled substance agreement signed  -     gabapentin (NEURONTIN) 600 MG tablet; Take 1 tablet by mouth 3 times daily for 90 days. Controlled type 2 diabetes mellitus with diabetic autonomic neuropathy, without long-term current use of insulin (HCC)  Stable and continue on current treatment    Essential hypertension  Try to lose weight or may need to increase dose    Morbidly obese (HCC)    Goals:   -Eat small amounts of food 4-5 times daily  -Avoid high fat and high sugar foods  -Include protein with all meals and snacks  -Avoid carbonation and caffeine  -Avoid calorie containing beverages  -Increase physical activity as tolerated      Return keep 9/13 f/u. HPI:  Roberto Parekh is a 68 y.o.      Bilateral neuropathy:  Persistent numbness and tingling in both feet and have not started on neurontin 300 mg tid.      Treatment Adherence:  Medication compliance: compliant all of the time  Diet compliance:

## 2023-08-03 DIAGNOSIS — M79.642 HAND PAIN, LEFT: Primary | ICD-10-CM

## 2023-08-21 ENCOUNTER — OFFICE VISIT (OUTPATIENT)
Dept: INTERNAL MEDICINE CLINIC | Age: 73
End: 2023-08-21

## 2023-08-21 VITALS
HEART RATE: 84 BPM | DIASTOLIC BLOOD PRESSURE: 72 MMHG | WEIGHT: 222 LBS | TEMPERATURE: 98.1 F | OXYGEN SATURATION: 97 % | BODY MASS INDEX: 33.65 KG/M2 | HEIGHT: 68 IN | SYSTOLIC BLOOD PRESSURE: 128 MMHG

## 2023-08-21 DIAGNOSIS — J06.9 URI WITH COUGH AND CONGESTION: ICD-10-CM

## 2023-08-21 RX ORDER — AZITHROMYCIN 250 MG/1
250 TABLET, FILM COATED ORAL SEE ADMIN INSTRUCTIONS
Qty: 6 TABLET | Refills: 0 | Status: SHIPPED | OUTPATIENT
Start: 2023-08-21 | End: 2023-08-26

## 2023-08-21 NOTE — PROGRESS NOTES
Ezekiel Parekh  YOB: 1950    Date of Service:  8/21/2023    Chief Complaint:      Chief Complaint   Patient presents with    Cough     For 1 week, 3 neg Covid tests (last one this AM)    Chest Congestion    Otalgia     Rt ear painful. Assessment/Plan:  Yesenia Zabala was seen today for cough, chest congestion and otalgia. Diagnoses and all orders for this visit:    URI with cough and congestion  -     azithromycin (ZITHROMAX) 250 MG tablet; Take 1 tablet by mouth See Admin Instructions for 5 days 500mg on day 1 followed by 250mg on days 2 - 5    If you're still having congestion, buy some over the counter Mucinex 1200 mg or Mucinex DM 1200 mg (if coughing) 1 pill twice a day  to thin up your secretions so it can drain to relieve pressure in your face/ears. Return if symptoms worsen or fail to improve. HPI:  Glo Roque is a 68 y.o. He complains of right ear pain and brownish discharge for a few days. He's coughing up green phlegm for about a week. Clear runny nose. No more sore throat, myalgia, fever or chills.     Lab Results   Component Value Date    LABA1C 6.6 06/05/2023    LABA1C 6.0 03/06/2023    LABA1C 5.9 10/24/2022     Lab Results   Component Value Date     03/06/2023    K 4.9 03/06/2023     03/06/2023    CO2 23 03/06/2023    BUN 24 (H) 03/06/2023    CREATININE 1.2 03/06/2023    GLUCOSE 109 (H) 03/06/2023    CALCIUM 9.9 03/06/2023     Lab Results   Component Value Date/Time    CHOL 181 03/06/2023 10:36 AM    TRIG 137 03/06/2023 10:36 AM    HDL 49 03/06/2023 10:36 AM    HDL 44 05/08/2012 10:12 AM    LDLCALC 105 03/06/2023 10:36 AM     Lab Results   Component Value Date    ALT 18 03/06/2023    AST 20 03/06/2023     Lab Results   Component Value Date    TSH 2.01 08/23/2011    TSH 1.78 03/24/2010     Lab Results   Component Value Date    WBC 5.8 03/06/2023    HGB 13.3 (L) 03/06/2023    HCT 41.3 03/06/2023    MCV 99.6 03/06/2023     03/06/2023     Lab

## 2023-09-03 SDOH — HEALTH STABILITY: PHYSICAL HEALTH: ON AVERAGE, HOW MANY MINUTES DO YOU ENGAGE IN EXERCISE AT THIS LEVEL?: 30 MIN

## 2023-09-03 SDOH — HEALTH STABILITY: PHYSICAL HEALTH: ON AVERAGE, HOW MANY DAYS PER WEEK DO YOU ENGAGE IN MODERATE TO STRENUOUS EXERCISE (LIKE A BRISK WALK)?: 2 DAYS

## 2023-09-06 ENCOUNTER — OFFICE VISIT (OUTPATIENT)
Dept: ORTHOPEDIC SURGERY | Age: 73
End: 2023-09-06

## 2023-09-06 VITALS — HEIGHT: 68 IN | RESPIRATION RATE: 16 BRPM | BODY MASS INDEX: 33.65 KG/M2 | WEIGHT: 222 LBS

## 2023-09-06 DIAGNOSIS — M72.0 DUPUYTREN'S CONTRACTURE: Primary | ICD-10-CM

## 2023-09-06 NOTE — PATIENT INSTRUCTIONS
Screening Evaluation for progression of Dupuytren's Contracture    Every 3 - 6 months, perform the \"TableTop Test\". (Illustrated below)    If you are able to place your hand flat on hard surface (table, counter, etc) then there is no treatment needed. If you find that you are unable to place your hand flat on hard surface, that usually indicates a 30 degree contracture of a finger. When TableTop test turns \"positive\" (can not lay hand flat anymore) this is the time to think about treatment of Dupuytren's Contracture. I recommend scheduling a follow-up appointment if/when your TableTop Test turns positive or if you think you range of motion is continuing to worsen. Call the office at [phone] or [phone] to schedule a follow-up appointment for your Dupuytren's Contracture.

## 2023-09-06 NOTE — PROGRESS NOTES
not be expected to 'cure' his  Dupuytren's Contracture or resolve his current contractures, but we are rather treating the symptoms with which he presents. He has understood that in order to achieve more durable relief of his symptoms and to prevent future worsening or further damage, that definitive treatment, in the form of surgery or enzyme injection, would be required. Mr. Erick Parekh  voiced an appropriate understanding of our discussion, the options available to him, and of the expectations of his selected  treatment. I have also discussed with Mr. Erick Parekh  the other treatment options available to him  for this condition. We have today selected to proceed with conservative management. He and I have agreed that if our current course of conservative treatment does not prove to be effective over the short term future, that he will schedule a follow-up appointment to discuss and select an alternate course of therapy including possibly injection or surgical treatment. I have asked Mr. Erick Parekh  to feel free to contact me or schedule a follow-up appointment at any time that he feels the need for any further evaluation or treatment for his upper extremitiy condition. If he feels that he continues to be feeling and functioning well, he may choose not to seek any further follow-up or treatment at his discretion. I will remain available to continue his care at any time in the future.

## 2023-09-13 ENCOUNTER — OFFICE VISIT (OUTPATIENT)
Dept: INTERNAL MEDICINE CLINIC | Age: 73
End: 2023-09-13

## 2023-09-13 VITALS
HEART RATE: 83 BPM | BODY MASS INDEX: 34.25 KG/M2 | SYSTOLIC BLOOD PRESSURE: 130 MMHG | DIASTOLIC BLOOD PRESSURE: 72 MMHG | WEIGHT: 226 LBS | HEIGHT: 68 IN | OXYGEN SATURATION: 96 %

## 2023-09-13 DIAGNOSIS — E11.40 NEUROPATHY DUE TO TYPE 2 DIABETES MELLITUS (HCC): ICD-10-CM

## 2023-09-13 DIAGNOSIS — E11.43 CONTROLLED TYPE 2 DIABETES MELLITUS WITH DIABETIC AUTONOMIC NEUROPATHY, WITHOUT LONG-TERM CURRENT USE OF INSULIN (HCC): Primary | ICD-10-CM

## 2023-09-13 DIAGNOSIS — M79.671 RIGHT FOOT PAIN: ICD-10-CM

## 2023-09-13 DIAGNOSIS — Z79.899 CONTROLLED SUBSTANCE AGREEMENT SIGNED: ICD-10-CM

## 2023-09-13 DIAGNOSIS — M76.60 ACHILLES TENDON PAIN: ICD-10-CM

## 2023-09-13 RX ORDER — MELOXICAM 15 MG/1
15 TABLET ORAL DAILY
Qty: 30 TABLET | Refills: 0 | Status: SHIPPED | OUTPATIENT
Start: 2023-09-13

## 2023-09-13 RX ORDER — GABAPENTIN 600 MG/1
600 TABLET ORAL 3 TIMES DAILY
Qty: 270 TABLET | Refills: 1 | Status: SHIPPED | OUTPATIENT
Start: 2023-09-13 | End: 2024-03-11

## 2023-09-13 NOTE — PROGRESS NOTES
Brian Parekh  YOB: 1950    Date of Service:  9/13/2023    Chief Complaint:      Chief Complaint   Patient presents with    Hypertension    Diabetes    Hyperlipidemia       Assessment/Plan:  Sherwin Walker was seen today for hypertension, diabetes and hyperlipidemia. Diagnoses and all orders for this visit:    Controlled type 2 diabetes mellitus with diabetic autonomic neuropathy, without long-term current use of insulin (HCC)  -     metFORMIN (GLUCOPHAGE) 1000 MG tablet; Take 1 tablet by mouth 2 times daily (with meals)    Neuropathy due to type 2 diabetes mellitus (HCC)  -     gabapentin (NEURONTIN) 600 MG tablet; Take 1 tablet by mouth 3 times daily for 180 days. Controlled substance agreement signed  -     gabapentin (NEURONTIN) 600 MG tablet; Take 1 tablet by mouth 3 times daily for 180 days. Right foot pain  Start meloxicam (MOBIC) 15 MG tablet; Take 1 tablet by mouth daily    Achilles tendon pain  Start meloxicam (MOBIC) 15 MG tablet; Take 1 tablet by mouth daily    Stable and continue on current medications. Return Fasting AWV and diabetes 3/6. HPI:  Rachel Christine is a 68 y.o. He complain of pain in his left achilles for about a year. He went to see a podiatrist and couldn't find what the problem is and now has an appointment with an orthopedic. He also has pain along right foot for about a year and been seeing a podiatrist who doesn't know the cause for the pain. Bilateral neuropathy:  resolve on neurontin 600 mg tid. Treatment Adherence:  Medication compliance: compliant all of the time  Diet compliance: compliant most of the time  Weight trend: stable 230 since have not reduce portion size  Current exercise: remodeling house daily, walk 2-3x/week  Barriers: none  Diabetes Mellitus Type 2: Current symptoms/problems include impove numbness and tingling in both bottom of feet .   Metformin 1 g twice a day   Home blood sugar records: fasting range: 110-160 AM and

## 2023-10-02 SDOH — HEALTH STABILITY: PHYSICAL HEALTH: ON AVERAGE, HOW MANY DAYS PER WEEK DO YOU ENGAGE IN MODERATE TO STRENUOUS EXERCISE (LIKE A BRISK WALK)?: 0 DAYS

## 2023-10-02 SDOH — HEALTH STABILITY: PHYSICAL HEALTH: ON AVERAGE, HOW MANY MINUTES DO YOU ENGAGE IN EXERCISE AT THIS LEVEL?: 0 MIN

## 2023-10-03 ENCOUNTER — OFFICE VISIT (OUTPATIENT)
Dept: ORTHOPEDIC SURGERY | Age: 73
End: 2023-10-03
Payer: MEDICARE

## 2023-10-03 VITALS — HEIGHT: 68 IN | BODY MASS INDEX: 34.56 KG/M2 | WEIGHT: 228 LBS

## 2023-10-03 DIAGNOSIS — M79.671 RIGHT FOOT PAIN: ICD-10-CM

## 2023-10-03 DIAGNOSIS — M25.572 LEFT ANKLE PAIN, UNSPECIFIED CHRONICITY: ICD-10-CM

## 2023-10-03 DIAGNOSIS — M76.62 LEFT ACHILLES TENDINITIS: Primary | ICD-10-CM

## 2023-10-03 DIAGNOSIS — M76.71 PERONEAL TENDONITIS, RIGHT: ICD-10-CM

## 2023-10-03 PROCEDURE — 1123F ACP DISCUSS/DSCN MKR DOCD: CPT | Performed by: ORTHOPAEDIC SURGERY

## 2023-10-03 PROCEDURE — G8427 DOCREV CUR MEDS BY ELIG CLIN: HCPCS | Performed by: ORTHOPAEDIC SURGERY

## 2023-10-03 PROCEDURE — 99214 OFFICE O/P EST MOD 30 MIN: CPT | Performed by: ORTHOPAEDIC SURGERY

## 2023-10-03 PROCEDURE — 3017F COLORECTAL CA SCREEN DOC REV: CPT | Performed by: ORTHOPAEDIC SURGERY

## 2023-10-03 PROCEDURE — G8484 FLU IMMUNIZE NO ADMIN: HCPCS | Performed by: ORTHOPAEDIC SURGERY

## 2023-10-03 PROCEDURE — G8417 CALC BMI ABV UP PARAM F/U: HCPCS | Performed by: ORTHOPAEDIC SURGERY

## 2023-10-03 PROCEDURE — 1036F TOBACCO NON-USER: CPT | Performed by: ORTHOPAEDIC SURGERY

## 2023-10-03 RX ORDER — MELOXICAM 7.5 MG/1
7.5 TABLET ORAL DAILY
Qty: 30 TABLET | Refills: 0 | Status: SHIPPED | OUTPATIENT
Start: 2023-10-03 | End: 2023-11-02

## 2023-10-09 ENCOUNTER — OFFICE VISIT (OUTPATIENT)
Dept: INTERNAL MEDICINE CLINIC | Age: 73
End: 2023-10-09

## 2023-10-09 VITALS
SYSTOLIC BLOOD PRESSURE: 166 MMHG | HEART RATE: 114 BPM | WEIGHT: 232 LBS | BODY MASS INDEX: 35.16 KG/M2 | DIASTOLIC BLOOD PRESSURE: 64 MMHG | HEIGHT: 68 IN | OXYGEN SATURATION: 99 %

## 2023-10-09 DIAGNOSIS — G89.29 CHRONIC LEFT HIP PAIN: ICD-10-CM

## 2023-10-09 DIAGNOSIS — M25.552 CHRONIC LEFT HIP PAIN: ICD-10-CM

## 2023-10-09 DIAGNOSIS — I10 ESSENTIAL HYPERTENSION: ICD-10-CM

## 2023-10-09 DIAGNOSIS — M25.511 ACUTE PAIN OF RIGHT SHOULDER: Primary | ICD-10-CM

## 2023-10-09 DIAGNOSIS — I49.9 IRREGULAR HEARTBEAT: ICD-10-CM

## 2023-10-09 RX ORDER — TIZANIDINE 4 MG/1
4 TABLET ORAL 3 TIMES DAILY PRN
Qty: 30 TABLET | Refills: 0 | Status: SHIPPED | OUTPATIENT
Start: 2023-10-09

## 2023-10-09 RX ORDER — PREDNISONE 20 MG/1
TABLET ORAL
Qty: 15 TABLET | Refills: 0 | Status: SHIPPED | OUTPATIENT
Start: 2023-10-09 | End: 2023-10-19

## 2023-10-09 NOTE — PROGRESS NOTES
Elisha Parekh  YOB: 1950    Date of Service:  10/9/2023    Chief Complaint:      Chief Complaint   Patient presents with    Joint Pain    Shoulder Pain     Right shoulder       Hip Pain     Left hip          Assessment/Plan:  Divine Roman was seen today for joint pain, shoulder pain and hip pain. Diagnoses and all orders for this visit:    Acute pain of right shoulder  Start tiZANidine (ZANAFLEX) 4 MG tablet; Take 1 tablet by mouth 3 times daily as needed (pain)  Start predniSONE (DELTASONE) 20 MG tablet; Take 3 pills in the AM for 1 day, 2 pills in AM for 5 days, then 1 pill in AM for 2 days    Irregular heartbeat  -     EKG 12 lead    Essential hypertension  Monitor to see if will decrease with decrease in pain    Chronic left hip pain  Follow up with Orthopedic        Return if symptoms worsen or fail to improve. HPI:  Germania Montero is a 68 y.o. He complains of right shoulder pain 10/10 when moving but no pain if not moving after he fell over a month ago. He couldn't lift his foot as he was going upstairs at home and fell face down on tile stairs. No shoulder pain till 3 weeks later. No pain currently until he tries to reach behind his back. He's been on Mobic daily per [facility] orthopedic. No lifting or other injuries recently but he had an old injury over 20 years ago. Chronic left hip pain:  H/o left hip OA and told he'll need surgery soon    He bought a fancy BP cuff that's connected to his iphone and it's been telling him that he's in A. Fib.     Lab Results   Component Value Date    LABA1C 6.6 06/05/2023    LABA1C 6.0 03/06/2023    LABA1C 5.9 10/24/2022     Lab Results   Component Value Date     03/06/2023    K 4.9 03/06/2023     03/06/2023    CO2 23 03/06/2023    BUN 24 (H) 03/06/2023    CREATININE 1.2 03/06/2023    GLUCOSE 109 (H) 03/06/2023    CALCIUM 9.9 03/06/2023     Lab Results   Component Value Date/Time    CHOL 181 03/06/2023 10:36 AM    TRIG 137 03/06/2023

## 2023-11-06 DIAGNOSIS — M79.671 RIGHT FOOT PAIN: ICD-10-CM

## 2023-11-06 DIAGNOSIS — M76.60 ACHILLES TENDON PAIN: ICD-10-CM

## 2023-11-06 RX ORDER — MELOXICAM 15 MG/1
15 TABLET ORAL DAILY
Qty: 30 TABLET | Refills: 0 | OUTPATIENT
Start: 2023-11-06

## 2023-11-21 ENCOUNTER — OFFICE VISIT (OUTPATIENT)
Dept: ORTHOPEDIC SURGERY | Age: 73
End: 2023-11-21

## 2023-11-21 VITALS — WEIGHT: 232 LBS | HEIGHT: 68 IN | BODY MASS INDEX: 35.16 KG/M2

## 2023-11-21 DIAGNOSIS — M76.71 PERONEAL TENDONITIS, RIGHT: ICD-10-CM

## 2023-11-21 DIAGNOSIS — M76.62 LEFT ACHILLES TENDINITIS: Primary | ICD-10-CM

## 2023-11-22 RX ORDER — MELOXICAM 7.5 MG/1
15 TABLET ORAL DAILY
Qty: 60 TABLET | Refills: 0 | Status: SHIPPED | OUTPATIENT
Start: 2023-11-22 | End: 2023-12-22

## 2024-01-18 ENCOUNTER — OFFICE VISIT (OUTPATIENT)
Dept: INTERNAL MEDICINE CLINIC | Age: 74
End: 2024-01-18

## 2024-01-18 VITALS
WEIGHT: 233 LBS | BODY MASS INDEX: 35.31 KG/M2 | HEIGHT: 68 IN | OXYGEN SATURATION: 98 % | HEART RATE: 78 BPM | DIASTOLIC BLOOD PRESSURE: 74 MMHG | SYSTOLIC BLOOD PRESSURE: 132 MMHG

## 2024-01-18 DIAGNOSIS — E11.43 CONTROLLED TYPE 2 DIABETES MELLITUS WITH DIABETIC AUTONOMIC NEUROPATHY, WITHOUT LONG-TERM CURRENT USE OF INSULIN (HCC): ICD-10-CM

## 2024-01-18 DIAGNOSIS — Z01.818 PREOP EXAM FOR INTERNAL MEDICINE: ICD-10-CM

## 2024-01-18 DIAGNOSIS — E78.2 MIXED HYPERLIPIDEMIA: ICD-10-CM

## 2024-01-18 DIAGNOSIS — I10 ESSENTIAL HYPERTENSION: ICD-10-CM

## 2024-01-18 DIAGNOSIS — S86.012S ACHILLES TENDON RUPTURE, LEFT, SEQUELA: Primary | ICD-10-CM

## 2024-01-18 DIAGNOSIS — E66.01 SEVERE OBESITY (BMI 35.0-39.9) WITH COMORBIDITY (HCC): ICD-10-CM

## 2024-01-18 ASSESSMENT — PATIENT HEALTH QUESTIONNAIRE - PHQ9
1. LITTLE INTEREST OR PLEASURE IN DOING THINGS: 0
SUM OF ALL RESPONSES TO PHQ9 QUESTIONS 1 & 2: 0
SUM OF ALL RESPONSES TO PHQ QUESTIONS 1-9: 0
SUM OF ALL RESPONSES TO PHQ QUESTIONS 1-9: 0
2. FEELING DOWN, DEPRESSED OR HOPELESS: 0
SUM OF ALL RESPONSES TO PHQ QUESTIONS 1-9: 0
SUM OF ALL RESPONSES TO PHQ QUESTIONS 1-9: 0

## 2024-01-18 NOTE — PROGRESS NOTES
Years of education: Not on file    Highest education level: Not on file   Occupational History    Not on file   Tobacco Use    Smoking status: Former     Current packs/day: 0.00     Average packs/day: 1 pack/day for 23.0 years (23.0 ttl pk-yrs)     Types: Cigarettes     Start date: 8/15/1968     Quit date: 8/15/1991     Years since quittin.4    Smokeless tobacco: Never   Vaping Use    Vaping Use: Never used   Substance and Sexual Activity    Alcohol use: No    Drug use: No    Sexual activity: Not Currently     Partners: Female     Comment: Straight,    Other Topics Concern    Not on file   Social History Narrative    Not on file     Social Determinants of Health     Financial Resource Strain: Low Risk  (3/6/2023)    Overall Financial Resource Strain (CARDIA)     Difficulty of Paying Living Expenses: Not hard at all   Food Insecurity: Not on file (3/6/2023)   Transportation Needs: Unknown (3/6/2023)    PRAPARE - Transportation     Lack of Transportation (Medical): Not on file     Lack of Transportation (Non-Medical): No   Physical Activity: Inactive (10/2/2023)    Exercise Vital Sign     Days of Exercise per Week: 0 days     Minutes of Exercise per Session: 0 min   Stress: Not on file   Social Connections: Not on file   Intimate Partner Violence: Not At Risk (10/2/2023)    Humiliation, Afraid, Rape, and Kick questionnaire     Fear of Current or Ex-Partner: No     Emotionally Abused: No     Physically Abused: No     Sexually Abused: No   Housing Stability: Unknown (3/6/2023)    Housing Stability Vital Sign     Unable to Pay for Housing in the Last Year: Not on file     Number of Places Lived in the Last Year: Not on file     Unstable Housing in the Last Year: No       Review of Systems  A comprehensive review of systems was negative except for what was noted in the HPI.    Physical Exam   Vitals:    24 1243   BP: 132/74   Pulse: 78   SpO2: 98%   Weight: 105.7 kg (233 lb)   Height: 1.727 m (5' 8\")

## 2024-03-03 SDOH — HEALTH STABILITY: PHYSICAL HEALTH: ON AVERAGE, HOW MANY DAYS PER WEEK DO YOU ENGAGE IN MODERATE TO STRENUOUS EXERCISE (LIKE A BRISK WALK)?: 0 DAYS

## 2024-03-03 SDOH — HEALTH STABILITY: PHYSICAL HEALTH: ON AVERAGE, HOW MANY MINUTES DO YOU ENGAGE IN EXERCISE AT THIS LEVEL?: 0 MIN

## 2024-03-03 ASSESSMENT — PATIENT HEALTH QUESTIONNAIRE - PHQ9
SUM OF ALL RESPONSES TO PHQ9 QUESTIONS 1 & 2: 0
SUM OF ALL RESPONSES TO PHQ QUESTIONS 1-9: 0
1. LITTLE INTEREST OR PLEASURE IN DOING THINGS: 0
SUM OF ALL RESPONSES TO PHQ QUESTIONS 1-9: 0
2. FEELING DOWN, DEPRESSED OR HOPELESS: 0

## 2024-03-03 ASSESSMENT — LIFESTYLE VARIABLES
HOW MANY STANDARD DRINKS CONTAINING ALCOHOL DO YOU HAVE ON A TYPICAL DAY: 0
HOW MANY STANDARD DRINKS CONTAINING ALCOHOL DO YOU HAVE ON A TYPICAL DAY: PATIENT DOES NOT DRINK
HOW OFTEN DO YOU HAVE SIX OR MORE DRINKS ON ONE OCCASION: 1
HOW OFTEN DO YOU HAVE A DRINK CONTAINING ALCOHOL: 1
HOW OFTEN DO YOU HAVE A DRINK CONTAINING ALCOHOL: NEVER

## 2024-03-06 ENCOUNTER — OFFICE VISIT (OUTPATIENT)
Dept: INTERNAL MEDICINE CLINIC | Age: 74
End: 2024-03-06

## 2024-03-06 VITALS
SYSTOLIC BLOOD PRESSURE: 152 MMHG | HEART RATE: 115 BPM | TEMPERATURE: 101 F | WEIGHT: 223 LBS | BODY MASS INDEX: 33.8 KG/M2 | HEIGHT: 68 IN | OXYGEN SATURATION: 93 % | DIASTOLIC BLOOD PRESSURE: 74 MMHG

## 2024-03-06 DIAGNOSIS — R50.9 FEVER AND CHILLS: ICD-10-CM

## 2024-03-06 DIAGNOSIS — E78.2 MIXED HYPERLIPIDEMIA: ICD-10-CM

## 2024-03-06 DIAGNOSIS — Z00.00 MEDICARE ANNUAL WELLNESS VISIT, SUBSEQUENT: Primary | ICD-10-CM

## 2024-03-06 DIAGNOSIS — E11.43 CONTROLLED TYPE 2 DIABETES MELLITUS WITH DIABETIC AUTONOMIC NEUROPATHY, WITHOUT LONG-TERM CURRENT USE OF INSULIN (HCC): ICD-10-CM

## 2024-03-06 DIAGNOSIS — J10.1 INFLUENZA A: ICD-10-CM

## 2024-03-06 DIAGNOSIS — J31.0 CHRONIC RHINITIS: ICD-10-CM

## 2024-03-06 DIAGNOSIS — R35.0 URINE FREQUENCY: ICD-10-CM

## 2024-03-06 DIAGNOSIS — Z79.899 CONTROLLED SUBSTANCE AGREEMENT SIGNED: ICD-10-CM

## 2024-03-06 DIAGNOSIS — M1A.0720 CHRONIC GOUT OF LEFT FOOT, UNSPECIFIED CAUSE: ICD-10-CM

## 2024-03-06 DIAGNOSIS — I10 ESSENTIAL HYPERTENSION: ICD-10-CM

## 2024-03-06 DIAGNOSIS — E11.40 NEUROPATHY DUE TO TYPE 2 DIABETES MELLITUS (HCC): ICD-10-CM

## 2024-03-06 LAB
BLOOD URINE, POC: NORMAL
CLARITY, POC: CLEAR
COLOR, POC: YELLOW
GLUCOSE URINE, POC: NORMAL
HBA1C MFR BLD: 7.3 %
INFLUENZA A ANTIBODY: POSITIVE
INFLUENZA B ANTIBODY: NEGATIVE
KETONES, POC: NORMAL
LEUKOCYTE EST, POC: NORMAL
PROTEIN, POC: NORMAL
SPECIFIC GRAVITY, POC: >1.03
UROBILINOGEN, POC: 0.2

## 2024-03-06 RX ORDER — LISINOPRIL 20 MG/1
20 TABLET ORAL DAILY
Qty: 90 TABLET | Refills: 3 | Status: SHIPPED | OUTPATIENT
Start: 2024-03-06

## 2024-03-06 RX ORDER — ALLOPURINOL 300 MG/1
300 TABLET ORAL DAILY
Qty: 90 TABLET | Refills: 3 | Status: SHIPPED | OUTPATIENT
Start: 2024-03-06

## 2024-03-06 RX ORDER — OSELTAMIVIR PHOSPHATE 75 MG/1
75 CAPSULE ORAL 2 TIMES DAILY
Qty: 10 CAPSULE | Refills: 0 | Status: SHIPPED | OUTPATIENT
Start: 2024-03-06 | End: 2024-03-11

## 2024-03-06 RX ORDER — GABAPENTIN 600 MG/1
600 TABLET ORAL 3 TIMES DAILY
Qty: 270 TABLET | Refills: 1 | Status: SHIPPED | OUTPATIENT
Start: 2024-03-06 | End: 2024-09-02

## 2024-03-06 RX ORDER — FLUTICASONE PROPIONATE 50 MCG
2 SPRAY, SUSPENSION (ML) NASAL DAILY
Qty: 3 EACH | Refills: 3 | Status: SHIPPED | OUTPATIENT
Start: 2024-03-06

## 2024-03-06 SDOH — ECONOMIC STABILITY: INCOME INSECURITY: HOW HARD IS IT FOR YOU TO PAY FOR THE VERY BASICS LIKE FOOD, HOUSING, MEDICAL CARE, AND HEATING?: NOT HARD AT ALL

## 2024-03-06 SDOH — ECONOMIC STABILITY: FOOD INSECURITY: WITHIN THE PAST 12 MONTHS, YOU WORRIED THAT YOUR FOOD WOULD RUN OUT BEFORE YOU GOT MONEY TO BUY MORE.: NEVER TRUE

## 2024-03-06 SDOH — ECONOMIC STABILITY: FOOD INSECURITY: WITHIN THE PAST 12 MONTHS, THE FOOD YOU BOUGHT JUST DIDN'T LAST AND YOU DIDN'T HAVE MONEY TO GET MORE.: NEVER TRUE

## 2024-03-06 NOTE — PROGRESS NOTES
Medicare Annual Wellness Visit    Greg Parekh is here for Medicare AWV, Diabetes, Cough, and Chest Congestion    Assessment & Plan   Controlled type 2 diabetes mellitus with diabetic autonomic neuropathy, without long-term current use of insulin ([facility])  -     POCT glycosylated hemoglobin (Hb A1C)  Medicare annual wellness visit, subsequent    Recommendations for Preventive Services Due: see orders and patient instructions/AVS.  Recommended screening schedule for the next 5-10 years is provided to the patient in written form: see Patient Instructions/AVS.     No follow-ups on file.     Subjective       Patient's complete Health Risk Assessment and screening values have been reviewed and are found in Flowsheets. The following problems were reviewed today and where indicated follow up appointments were made and/or referrals ordered.    Positive Risk Factor Screenings with Interventions:    Fall Risk:  Do you feel unsteady or are you worried about falling? : (!) yes (thinks it because of recent inury and surgery for sore tendon.)  2 or more falls in past year?: (!) yes (lost balance)  Fall with injury in past year?: no     Interventions:    Reviewed medications, home hazards, visual acuity, and co-morbidities that can increase risk for falls             Activity, Diet, and Weight:  On average, how many days per week do you engage in moderate to strenuous exercise (like a brisk walk)?: 0 days  On average, how many minutes do you engage in exercise at this level?: 0 min    Do you eat balanced/healthy meals regularly?: (!) No (working on this)    Body mass index is 33.91 kg/m². (!) Abnormal      Inactivity Interventions:  Patient comments: not as active since had achilles surgery  Do you eat balanced/healthy meals regularly Interventions:  Patient comments: been watching his diet  Obesity Interventions:  Patient comments: losing weight                               Objective   Vitals:    03/06/24 0848   BP: (!) 152/74

## 2024-03-06 NOTE — PATIENT INSTRUCTIONS
dried.  Beans, peas, and lentils count too.    Whole grains    Choose whole-grain breads, cereals, and noodles.  Try brown rice.    Lean proteins    These can include lean meat, poultry, fish, and eggs.  You can also have tofu, beans, peas, lentils, nuts, and seeds.    Dairy    Try milk, yogurt, and cheese.  Choose low-fat or fat-free when you can.  If you need to, use lactose-free milk or fortified plant-based milk products, such as soy milk.    Water    Drink water when you're thirsty.  Limit sugar-sweetened drinks, including soda, fruit drinks, and sports drinks.  Where can you learn more?  Go to https://www.[employer].net/patientEd and enter T756 to learn more about \"Eating Healthy Foods: Care Instructions.\"  Current as of: September 20, 2023               Content Version: 13.9  © 4696-3672 [employer].   Care instructions adapted under license by [employer]. If you have questions about a medical condition or this instruction, always ask your healthcare professional. [employer] disclaims any warranty or liability for your use of this information.           Starting a Weight Loss Plan: Care Instructions  Overview     If you're thinking about losing weight, it can be hard to know where to start. Your doctor can help you set up a weight loss plan that best meets your needs. You may want to take a class on nutrition or exercise, or you could join a weight loss support group. If you have questions about how to make changes to your eating or exercise habits, ask your doctor about seeing a registered dietitian or an exercise specialist.  It can be a big challenge to lose weight. But you don't have to make huge changes at once. Make small changes, and stick with them. When those changes become habit, add a few more changes.  If you don't think you're ready to make changes right now, try to pick a date in the future. Make an appointment to see your doctor to discuss whether the time is right

## 2024-03-07 LAB
ALBUMIN SERPL-MCNC: 4.6 G/DL (ref 3.4–5)
ALBUMIN/GLOB SERPL: 1.9 {RATIO} (ref 1.1–2.2)
ALP SERPL-CCNC: 78 U/L (ref 40–129)
ALT SERPL-CCNC: 16 U/L (ref 10–40)
ANION GAP SERPL CALCULATED.3IONS-SCNC: 12 MMOL/L (ref 3–16)
BASOPHILS # BLD: 0.1 K/UL (ref 0–0.2)
BASOPHILS NFR BLD: 0.8 %
BILIRUB SERPL-MCNC: 0.8 MG/DL (ref 0–1)
BUN SERPL-MCNC: 16 MG/DL (ref 7–20)
CALCIUM SERPL-MCNC: 9.9 MG/DL (ref 8.3–10.6)
CHLORIDE SERPL-SCNC: 98 MMOL/L (ref 99–110)
CHOLEST SERPL-MCNC: 198 MG/DL (ref 0–199)
CO2 SERPL-SCNC: 29 MMOL/L (ref 21–32)
CREAT SERPL-MCNC: 1.4 MG/DL (ref 0.8–1.3)
CREAT UR-MCNC: 131 MG/DL (ref 39–259)
DEPRECATED RDW RBC AUTO: 13 % (ref 12.4–15.4)
EOSINOPHIL # BLD: 0.1 K/UL (ref 0–0.6)
EOSINOPHIL NFR BLD: 0.9 %
GLUCOSE SERPL-MCNC: 191 MG/DL (ref 70–99)
HCT VFR BLD AUTO: 40.1 % (ref 40.5–52.5)
HDLC SERPL-MCNC: 47 MG/DL (ref 40–60)
HGB BLD-MCNC: 13.4 G/DL (ref 13.5–17.5)
LDLC SERPL CALC-MCNC: 113 MG/DL
LYMPHOCYTES # BLD: 0.9 K/UL (ref 1–5.1)
LYMPHOCYTES NFR BLD: 9.8 %
MCH RBC QN AUTO: 31.9 PG (ref 26–34)
MCV RBC AUTO: 95.5 FL (ref 80–100)
MICROALBUMIN UR DL<=1MG/L-MCNC: 5.2 MG/DL
MICROALBUMIN/CREAT UR: 39.7 MG/G (ref 0–30)
MONOCYTES # BLD: 0.5 K/UL (ref 0–1.3)
MONOCYTES NFR BLD: 5.8 %
NEUTROPHILS # BLD: 7.2 K/UL (ref 1.7–7.7)
NEUTROPHILS NFR BLD: 82.7 %
PLATELET # BLD AUTO: 166 K/UL (ref 135–450)
PMV BLD AUTO: 10.2 FL (ref 5–10.5)
POTASSIUM SERPL-SCNC: 4.7 MMOL/L (ref 3.5–5.1)
PROT SERPL-MCNC: 7 G/DL (ref 6.4–8.2)
RBC # BLD AUTO: 4.19 M/UL (ref 4.2–5.9)
SODIUM SERPL-SCNC: 139 MMOL/L (ref 136–145)
TRIGL SERPL-MCNC: 190 MG/DL (ref 0–150)
VLDLC SERPL CALC-MCNC: 38 MG/DL

## 2024-06-11 ENCOUNTER — TELEPHONE (OUTPATIENT)
Dept: ADMINISTRATIVE | Age: 74
End: 2024-06-11

## 2024-06-11 NOTE — TELEPHONE ENCOUNTER
There was a PA received VIA [facility] FOR Mounjaro 10MG/0.5ML pen-injectors , but there is not a current RX on file.(Key: [id number])     If you want PA please submit new RX, and resend this PA request back to the pool    If this requires a response please respond to the pool ( P [facility] MEDICATION PRE-AUTH).      Thank you

## 2024-06-14 ENCOUNTER — OFFICE VISIT (OUTPATIENT)
Dept: INTERNAL MEDICINE CLINIC | Age: 74
End: 2024-06-14

## 2024-06-14 VITALS
HEIGHT: 68 IN | RESPIRATION RATE: 14 BRPM | WEIGHT: 227 LBS | HEART RATE: 92 BPM | DIASTOLIC BLOOD PRESSURE: 76 MMHG | BODY MASS INDEX: 34.4 KG/M2 | SYSTOLIC BLOOD PRESSURE: 140 MMHG

## 2024-06-14 DIAGNOSIS — R00.2 PALPITATIONS: ICD-10-CM

## 2024-06-14 DIAGNOSIS — N39.43 DRIBBLING OF URINE: ICD-10-CM

## 2024-06-14 DIAGNOSIS — R39.14 BENIGN PROSTATIC HYPERPLASIA WITH INCOMPLETE BLADDER EMPTYING: ICD-10-CM

## 2024-06-14 DIAGNOSIS — N40.1 BENIGN PROSTATIC HYPERPLASIA WITH INCOMPLETE BLADDER EMPTYING: ICD-10-CM

## 2024-06-14 DIAGNOSIS — I48.0 PAROXYSMAL ATRIAL FIBRILLATION (HCC): Primary | ICD-10-CM

## 2024-06-14 DIAGNOSIS — R79.89 LOW TESTOSTERONE: ICD-10-CM

## 2024-06-14 DIAGNOSIS — E11.43 CONTROLLED TYPE 2 DIABETES MELLITUS WITH DIABETIC AUTONOMIC NEUROPATHY, WITHOUT LONG-TERM CURRENT USE OF INSULIN (HCC): ICD-10-CM

## 2024-06-14 PROCEDURE — 3078F DIAST BP <80 MM HG: CPT | Performed by: NURSE PRACTITIONER

## 2024-06-14 PROCEDURE — 3077F SYST BP >= 140 MM HG: CPT | Performed by: NURSE PRACTITIONER

## 2024-06-14 PROCEDURE — 99204 OFFICE O/P NEW MOD 45 MIN: CPT | Performed by: NURSE PRACTITIONER

## 2024-06-14 PROCEDURE — 2022F DILAT RTA XM EVC RTNOPTHY: CPT | Performed by: NURSE PRACTITIONER

## 2024-06-14 PROCEDURE — 3017F COLORECTAL CA SCREEN DOC REV: CPT | Performed by: NURSE PRACTITIONER

## 2024-06-14 PROCEDURE — 1036F TOBACCO NON-USER: CPT | Performed by: NURSE PRACTITIONER

## 2024-06-14 PROCEDURE — G8427 DOCREV CUR MEDS BY ELIG CLIN: HCPCS | Performed by: NURSE PRACTITIONER

## 2024-06-14 PROCEDURE — 1123F ACP DISCUSS/DSCN MKR DOCD: CPT | Performed by: NURSE PRACTITIONER

## 2024-06-14 PROCEDURE — G8417 CALC BMI ABV UP PARAM F/U: HCPCS | Performed by: NURSE PRACTITIONER

## 2024-06-14 PROCEDURE — 3051F HG A1C>EQUAL 7.0%<8.0%: CPT | Performed by: NURSE PRACTITIONER

## 2024-06-14 NOTE — PROGRESS NOTES
Transportation (Non-Medical): No   Physical Activity: Inactive (3/3/2024)    Exercise Vital Sign     Days of Exercise per Week: 0 days     Minutes of Exercise per Session: 0 min   Stress: Not on file   Social Connections: Not on file   Intimate Partner Violence: Not At Risk (10/2/2023)    Humiliation, Afraid, Rape, and Kick questionnaire     Fear of Current or Ex-Partner: No     Emotionally Abused: No     Physically Abused: No     Sexually Abused: No   Housing Stability: Unknown (3/6/2024)    Housing Stability Vital Sign     Unable to Pay for Housing in the Last Year: Not on file     Number of Places Lived in the Last Year: Not on file     Unstable Housing in the Last Year: No       SurgicalHistory  Past Surgical History:   Procedure Laterality Date    ARTHROGRAPHY Right 09/29/2020    ARTHROGRAM AND CORTISONE INJECTION RIGHT HIP performed by Julian Dunn MD at [facility] OR    BACK SURGERY      COLONOSCOPY  2007    normal per pt    EYE SURGERY  2018?    Macular hole, Catarac, left eye    JOINT REPLACEMENT  Hip 2021    SHOULDER SURGERY      TONGUE SURGERY      ectactic vessel and thrombus    TONSILLECTOMY  unknown    UMBILICAL HERNIA REPAIR         Physical Exam  Physical Exam  Constitutional:       Appearance: Normal appearance. He is well-developed.   HENT:      Head: Normocephalic and atraumatic.   Eyes:      Conjunctiva/sclera: Conjunctivae normal.      Pupils: Pupils are equal, round, and reactive to light.   Neck:      Vascular: No carotid bruit.      Trachea: No tracheal deviation.   Cardiovascular:      Rate and Rhythm: Normal rate and regular rhythm.      Pulses: Normal pulses.      Heart sounds: Normal heart sounds. No murmur heard.     No friction rub. No gallop.   Pulmonary:      Effort: Pulmonary effort is normal.      Breath sounds: Normal breath sounds. No wheezing, rhonchi or rales.   Abdominal:      General: Bowel sounds are normal. There is no distension.      Palpations: Abdomen is soft.

## 2024-06-15 LAB
EST. AVERAGE GLUCOSE BLD GHB EST-MCNC: 174.3 MG/DL
HBA1C MFR BLD: 7.7 %
MAGNESIUM SERPL-MCNC: 1.6 MG/DL (ref 1.8–2.4)
PSA SERPL DL<=0.01 NG/ML-MCNC: 0.46 NG/ML (ref 0–4)
TSH SERPL DL<=0.005 MIU/L-ACNC: 2.3 UIU/ML (ref 0.27–4.2)

## 2024-06-19 ENCOUNTER — ANCILLARY PROCEDURE (OUTPATIENT)
Dept: CARDIOLOGY CLINIC | Age: 74
End: 2024-06-19
Payer: MEDICARE

## 2024-06-19 ENCOUNTER — TELEPHONE (OUTPATIENT)
Dept: CARDIOLOGY CLINIC | Age: 74
End: 2024-06-19

## 2024-06-19 DIAGNOSIS — I48.0 PAROXYSMAL ATRIAL FIBRILLATION (HCC): ICD-10-CM

## 2024-06-19 PROCEDURE — 93228 REMOTE 30 DAY ECG REV/REPORT: CPT | Performed by: INTERNAL MEDICINE

## 2024-06-19 NOTE — TELEPHONE ENCOUNTER
Monitor placed by Mabel MCADAMS  Monitor company [employer]  Length of monitor 30 days  Monitor ordered by Veronica Lezama CNP  Serial number [id number]    Activation successful prior to pt leaving office? Yes

## 2024-06-20 LAB
SHBG SERPL-SCNC: 21 NMOL/L (ref 19–76)
TESTOST FREE SERPL-MCNC: 44.7 PG/ML (ref 47–244)
TESTOST SERPL-MCNC: 186 NG/DL (ref 193–740)

## 2024-06-24 ASSESSMENT — ENCOUNTER SYMPTOMS: SHORTNESS OF BREATH: 0

## 2024-07-19 ENCOUNTER — TELEPHONE (OUTPATIENT)
Dept: INTERNAL MEDICINE CLINIC | Age: 74
End: 2024-07-19

## 2024-07-19 DIAGNOSIS — I48.91 ATRIAL FIBRILLATION, UNSPECIFIED TYPE (HCC): Primary | ICD-10-CM

## 2024-07-19 NOTE — TELEPHONE ENCOUNTER
----- Message from HARESH Avitia CNP sent at 7/19/2024  3:53 PM EDT -----  Patient's heart monitor picked up a-fib.    Referral to cardiology  Patient will also need to be on blood thinner.  Will send in Eliquis.   Patient should watch HR level.  If it is higher than 110-120's, or if he has symptoms of shortness of breath, he should go to the ED.

## 2024-07-19 NOTE — TELEPHONE ENCOUNTER
Left voicemail to return call to inform him of the results, to give him Dr. Lambert phone number and to let him know eliquis was sent in.

## 2024-08-02 ENCOUNTER — TELEPHONE (OUTPATIENT)
Dept: INTERNAL MEDICINE CLINIC | Age: 74
End: 2024-08-02

## 2024-08-02 DIAGNOSIS — I49.9 IRREGULAR HEARTBEAT: Primary | ICD-10-CM

## 2024-08-02 NOTE — TELEPHONE ENCOUNTER
----- Message from HARESH Avitia CNP sent at 8/2/2024 12:58 PM EDT -----  Have him see cardiology to be sure we could stop the Eliquis.   Referral to Claudia .

## 2024-08-20 ENCOUNTER — HOSPITAL ENCOUNTER (OUTPATIENT)
Age: 74
Discharge: HOME | End: 2024-08-20
Payer: OTHER GOVERNMENT

## 2024-08-20 LAB
EKG ATRIAL RATE: 104 BPM
EKG DIAGNOSIS: NORMAL
EKG P AXIS: 44 DEGREES
EKG P-R INTERVAL: 200 MS
EKG Q-T INTERVAL: 354 MS
EKG QRS DURATION: 132 MS
EKG QTC CALCULATION (BAZETT): 465 MS
EKG R AXIS: 44 DEGREES
EKG T AXIS: 9 DEGREES
EKG VENTRICULAR RATE: 104 BPM

## 2024-08-20 PROCEDURE — 93010 ELECTROCARDIOGRAM REPORT: CPT | Performed by: INTERNAL MEDICINE

## 2024-08-20 PROCEDURE — 93005 ELECTROCARDIOGRAM TRACING: CPT | Performed by: INTERNAL MEDICINE

## 2024-09-06 ENCOUNTER — TELEPHONE (OUTPATIENT)
Dept: INTERNAL MEDICINE CLINIC | Age: 74
End: 2024-09-06

## 2024-09-06 RX ORDER — BUDESONIDE AND FORMOTEROL FUMARATE DIHYDRATE 160; 4.5 UG/1; UG/1
2 AEROSOL RESPIRATORY (INHALATION) 2 TIMES DAILY
Qty: 10.2 G | Refills: 2 | Status: SHIPPED | OUTPATIENT
Start: 2024-09-06

## 2024-09-06 NOTE — TELEPHONE ENCOUNTER
----- Message from Dr. Andrei Asher MD sent at 9/6/2024  3:47 PM EDT -----  Refill  ----- Message -----  From: Brandie Carpenter MA  Sent: 9/6/2024   3:03 PM EDT  To: Andrei Asher MD    Okay to fill? Please advise.     Symbicort 160-4.5 MCG/ACT Aero  `  Started taking on September 6, 2024  Comments: I had been using this under my previous doctor. I had an ample supply, as I did not use it daily, only as needed.  I have now exhausted my supply and need a refill.  Can you request a refill for me? Thanks in advance, Antonio

## 2024-09-11 ENCOUNTER — TELEPHONE (OUTPATIENT)
Dept: INTERNAL MEDICINE CLINIC | Age: 74
End: 2024-09-11

## 2024-09-11 RX ORDER — FLUTICASONE FUROATE AND VILANTEROL 100; 25 UG/1; UG/1
1 POWDER RESPIRATORY (INHALATION) 2 TIMES DAILY
Qty: 60 EACH | Refills: 2 | Status: SHIPPED | OUTPATIENT
Start: 2024-09-11 | End: 2024-09-12 | Stop reason: SDUPTHER

## 2024-09-12 ENCOUNTER — TELEPHONE (OUTPATIENT)
Dept: INTERNAL MEDICINE CLINIC | Age: 74
End: 2024-09-12

## 2024-09-12 RX ORDER — FLUTICASONE FUROATE AND VILANTEROL 100; 25 UG/1; UG/1
1 POWDER RESPIRATORY (INHALATION) DAILY
Qty: 60 EACH | Refills: 2 | Status: SHIPPED | OUTPATIENT
Start: 2024-09-12

## 2024-09-18 ENCOUNTER — OFFICE VISIT (OUTPATIENT)
Dept: INTERNAL MEDICINE CLINIC | Age: 74
End: 2024-09-18

## 2024-09-18 VITALS
RESPIRATION RATE: 18 BRPM | HEART RATE: 85 BPM | HEIGHT: 68 IN | DIASTOLIC BLOOD PRESSURE: 75 MMHG | BODY MASS INDEX: 34.86 KG/M2 | SYSTOLIC BLOOD PRESSURE: 142 MMHG | WEIGHT: 230 LBS

## 2024-09-18 DIAGNOSIS — R39.14 BENIGN PROSTATIC HYPERPLASIA WITH INCOMPLETE BLADDER EMPTYING: ICD-10-CM

## 2024-09-18 DIAGNOSIS — E11.40 NEUROPATHY DUE TO TYPE 2 DIABETES MELLITUS (HCC): ICD-10-CM

## 2024-09-18 DIAGNOSIS — N40.1 BENIGN PROSTATIC HYPERPLASIA WITH INCOMPLETE BLADDER EMPTYING: ICD-10-CM

## 2024-09-18 DIAGNOSIS — J31.0 CHRONIC RHINITIS: ICD-10-CM

## 2024-09-18 DIAGNOSIS — I10 ESSENTIAL HYPERTENSION: ICD-10-CM

## 2024-09-18 DIAGNOSIS — E11.43 CONTROLLED TYPE 2 DIABETES MELLITUS WITH DIABETIC AUTONOMIC NEUROPATHY, WITHOUT LONG-TERM CURRENT USE OF INSULIN (HCC): Primary | ICD-10-CM

## 2024-09-18 DIAGNOSIS — I48.0 PAROXYSMAL ATRIAL FIBRILLATION (HCC): ICD-10-CM

## 2024-09-18 DIAGNOSIS — D68.69 SECONDARY HYPERCOAGULABLE STATE (HCC): ICD-10-CM

## 2024-09-18 PROCEDURE — 2022F DILAT RTA XM EVC RTNOPTHY: CPT | Performed by: INTERNAL MEDICINE

## 2024-09-18 PROCEDURE — 1123F ACP DISCUSS/DSCN MKR DOCD: CPT | Performed by: INTERNAL MEDICINE

## 2024-09-18 PROCEDURE — 99213 OFFICE O/P EST LOW 20 MIN: CPT | Performed by: INTERNAL MEDICINE

## 2024-09-18 PROCEDURE — G8427 DOCREV CUR MEDS BY ELIG CLIN: HCPCS | Performed by: INTERNAL MEDICINE

## 2024-09-18 PROCEDURE — 1036F TOBACCO NON-USER: CPT | Performed by: INTERNAL MEDICINE

## 2024-09-18 PROCEDURE — 3051F HG A1C>EQUAL 7.0%<8.0%: CPT | Performed by: INTERNAL MEDICINE

## 2024-09-18 PROCEDURE — 3078F DIAST BP <80 MM HG: CPT | Performed by: INTERNAL MEDICINE

## 2024-09-18 PROCEDURE — 3017F COLORECTAL CA SCREEN DOC REV: CPT | Performed by: INTERNAL MEDICINE

## 2024-09-18 PROCEDURE — G8417 CALC BMI ABV UP PARAM F/U: HCPCS | Performed by: INTERNAL MEDICINE

## 2024-09-18 PROCEDURE — 3077F SYST BP >= 140 MM HG: CPT | Performed by: INTERNAL MEDICINE

## 2024-09-18 RX ORDER — METOPROLOL SUCCINATE 25 MG/1
25 TABLET, EXTENDED RELEASE ORAL DAILY
Qty: 30 TABLET | Refills: 5 | Status: SHIPPED | OUTPATIENT
Start: 2024-09-18

## 2024-09-18 ASSESSMENT — ENCOUNTER SYMPTOMS: SHORTNESS OF BREATH: 0

## 2024-11-07 ENCOUNTER — OFFICE VISIT (OUTPATIENT)
Dept: INTERNAL MEDICINE CLINIC | Age: 74
End: 2024-11-07

## 2024-11-07 VITALS
HEIGHT: 68 IN | RESPIRATION RATE: 18 BRPM | WEIGHT: 232 LBS | DIASTOLIC BLOOD PRESSURE: 75 MMHG | BODY MASS INDEX: 35.16 KG/M2 | SYSTOLIC BLOOD PRESSURE: 145 MMHG | HEART RATE: 65 BPM

## 2024-11-07 DIAGNOSIS — E66.01 CLASS 2 SEVERE OBESITY WITH BODY MASS INDEX (BMI) OF 35 TO 39.9 WITH SERIOUS COMORBIDITY: ICD-10-CM

## 2024-11-07 DIAGNOSIS — I10 BENIGN ESSENTIAL HTN: ICD-10-CM

## 2024-11-07 DIAGNOSIS — E78.2 MIXED HYPERLIPIDEMIA: Primary | ICD-10-CM

## 2024-11-07 DIAGNOSIS — E66.812 CLASS 2 SEVERE OBESITY WITH BODY MASS INDEX (BMI) OF 35 TO 39.9 WITH SERIOUS COMORBIDITY: ICD-10-CM

## 2024-11-07 DIAGNOSIS — E11.43 CONTROLLED TYPE 2 DIABETES MELLITUS WITH DIABETIC AUTONOMIC NEUROPATHY, WITHOUT LONG-TERM CURRENT USE OF INSULIN (HCC): ICD-10-CM

## 2024-11-07 PROCEDURE — G8484 FLU IMMUNIZE NO ADMIN: HCPCS | Performed by: INTERNAL MEDICINE

## 2024-11-07 PROCEDURE — 1160F RVW MEDS BY RX/DR IN RCRD: CPT | Performed by: INTERNAL MEDICINE

## 2024-11-07 PROCEDURE — 3017F COLORECTAL CA SCREEN DOC REV: CPT | Performed by: INTERNAL MEDICINE

## 2024-11-07 PROCEDURE — 1159F MED LIST DOCD IN RCRD: CPT | Performed by: INTERNAL MEDICINE

## 2024-11-07 PROCEDURE — 3078F DIAST BP <80 MM HG: CPT | Performed by: INTERNAL MEDICINE

## 2024-11-07 PROCEDURE — G8427 DOCREV CUR MEDS BY ELIG CLIN: HCPCS | Performed by: INTERNAL MEDICINE

## 2024-11-07 PROCEDURE — G8417 CALC BMI ABV UP PARAM F/U: HCPCS | Performed by: INTERNAL MEDICINE

## 2024-11-07 PROCEDURE — 99213 OFFICE O/P EST LOW 20 MIN: CPT | Performed by: INTERNAL MEDICINE

## 2024-11-07 PROCEDURE — 3077F SYST BP >= 140 MM HG: CPT | Performed by: INTERNAL MEDICINE

## 2024-11-07 PROCEDURE — 1036F TOBACCO NON-USER: CPT | Performed by: INTERNAL MEDICINE

## 2024-11-07 PROCEDURE — 2022F DILAT RTA XM EVC RTNOPTHY: CPT | Performed by: INTERNAL MEDICINE

## 2024-11-07 PROCEDURE — 1123F ACP DISCUSS/DSCN MKR DOCD: CPT | Performed by: INTERNAL MEDICINE

## 2024-11-07 PROCEDURE — 3051F HG A1C>EQUAL 7.0%<8.0%: CPT | Performed by: INTERNAL MEDICINE

## 2024-11-07 RX ORDER — METFORMIN HYDROCHLORIDE 500 MG/1
1000 TABLET, EXTENDED RELEASE ORAL 2 TIMES DAILY
Qty: 180 TABLET | Refills: 0 | Status: SHIPPED | OUTPATIENT
Start: 2024-11-07

## 2024-11-07 RX ORDER — LISINOPRIL 40 MG/1
40 TABLET ORAL DAILY
Qty: 90 TABLET | Refills: 1 | Status: SHIPPED | OUTPATIENT
Start: 2024-11-07

## 2024-11-07 ASSESSMENT — ENCOUNTER SYMPTOMS: SHORTNESS OF BREATH: 0

## 2024-11-07 NOTE — PROGRESS NOTES
daily 30 tablet 5    fluticasone furoate-vilanterol (BREO ELLIPTA) 100-25 MCG/ACT inhaler Inhale 1 puff into the lungs daily 60 each 2    gabapentin (NEURONTIN) 600 MG tablet Take 1 tablet by mouth 3 times daily for 180 days. 270 tablet 1    metFORMIN (GLUCOPHAGE) 1000 MG tablet Take 1 tablet by mouth 2 times daily (with meals) 180 tablet 3    fluticasone (FLONASE) 50 MCG/ACT nasal spray 2 sprays by Each Nostril route daily 3 each 3    lisinopril (PRINIVIL;ZESTRIL) 20 MG tablet Take 1 tablet by mouth daily 90 tablet 3    allopurinol (ZYLOPRIM) 300 MG tablet Take 1 tablet by mouth daily 90 tablet 3    fluocinonide (LIDEX) 0.05 % ointment Apply topically daily Indications: 30 gm Apply topically 2 times daily.       Compression Stockings MISC by Does not apply route 1 each 1    aspirin 81 MG EC tablet Take 1 tablet by mouth daily       Current Facility-Administered Medications   Medication Dose Route Frequency Provider Last Rate Last Admin    Tirzepatide (MOUNJARO) SC injection 2.5 mg  2.5 mg SubCUTAneous Weekly              Review of Systems  Review of Systems   Constitutional:  Negative for activity change, diaphoresis and fatigue.   Respiratory:  Negative for shortness of breath.    Cardiovascular:  Negative for chest pain and palpitations.   Musculoskeletal:  Positive for arthralgias.     Vitals:    11/07/24 1009   BP: (!) 145/75   Pulse: 65   Resp: 18         General: elderly male healthy appearing   Awake, alert and oriented. Appears to be not in any distress  Mucous Membranes:  Pink , anicteric  Neck: No JVD, no carotid bruit, no thyromegaly  Chest:  Clear to auscultation bilaterally, no added sounds  Cardiovascular:  RRR S1S2 heard, no murmurs or gallops  Abdomen:  Soft, obese, undistended, non tender, no organomegaly, BS present  Small umbilical hernia reducible   Extremities: No edema or cyanosis. Distal pulses well felt  Neurological : grossly normal- non focal     Stress test  2021     Normal myocardial

## 2024-11-08 NOTE — PROGRESS NOTES
CARDIOLOGY CONSULTATION        Patient Name: Greg Parekh  Primary Care physician: Andrei Asher MD    Reason for Referral/Chief Complaint: Greg Parekh is a 74 y.o. patient who is referred to cardiology clinic today for evaluation and treatment of Afib.     History of Present Illness:   Greg Parekh is a 74 y.o. male with a pmhx of PAF diagnosed in 2023, DM II, HTN, HLD, CKD 3A. He is referred by Andrei Asher MD for afib. Prior cardiac testing includes; echo 9/27/21 EF of 55-60%, grade I DD, mild MR, AR. 9/27/21 stress test showed a mild inferior defect consistent with attenuation artifact. There is no evidence for ischemia.    Patient wore a cardiac event monitor from 6/20 to 7/19/24 which demonstrated predominately sinus rhythm with an average HR of 87 () BPM.     Today, he says \"I'm not having chest pain anymore\" he says that prior he was having pains after eating in middle of his chest for about a year. He says it feels like food will get stuck. However he was placed on a medication that he does not recall the name of and it seems to help. Patient reports shortness of breath that has gotten increasingly worse over the past year. He is short of breath with exertion. \" I associated this with the RSV shot that I got last year because this has gotten worse since the vaccine.\" He reports a 10lb weight gain within the last 4 months.  He equates this with being more sedentary over the past year.  He reports his apple watch will tell him when his heart is racing. Prior to being put on eliquis and metoprolol his heart rate would be in the 150's. Last year 7/2023 his BP monitor showed an irregular HR and he put on his apple watch which confirmed AFIBB. (Dr. Asher saw those tracings). He has symptoms at times of dizziness/lightheadedness and feeling like he is going to pass out. \"I have fallen a lot, I am guessing it is from gabapentin due to my neuropathy\". Last fall 4 months ago.

## 2024-11-12 ENCOUNTER — OFFICE VISIT (OUTPATIENT)
Dept: CARDIOLOGY CLINIC | Age: 74
End: 2024-11-12

## 2024-11-12 VITALS
HEART RATE: 66 BPM | BODY MASS INDEX: 34.86 KG/M2 | DIASTOLIC BLOOD PRESSURE: 70 MMHG | OXYGEN SATURATION: 98 % | HEIGHT: 68 IN | SYSTOLIC BLOOD PRESSURE: 130 MMHG | WEIGHT: 230 LBS

## 2024-11-12 DIAGNOSIS — R07.9 CHEST PAIN, UNSPECIFIED TYPE: ICD-10-CM

## 2024-11-12 DIAGNOSIS — Z79.899 MEDICATION MANAGEMENT: ICD-10-CM

## 2024-11-12 DIAGNOSIS — E11.43 CONTROLLED TYPE 2 DIABETES MELLITUS WITH DIABETIC AUTONOMIC NEUROPATHY, WITHOUT LONG-TERM CURRENT USE OF INSULIN (HCC): ICD-10-CM

## 2024-11-12 DIAGNOSIS — Z87.891 HISTORY OF TOBACCO ABUSE: ICD-10-CM

## 2024-11-12 DIAGNOSIS — I10 ESSENTIAL HYPERTENSION: Primary | ICD-10-CM

## 2024-11-12 DIAGNOSIS — R06.02 SHORTNESS OF BREATH: ICD-10-CM

## 2024-11-12 DIAGNOSIS — E78.2 MIXED HYPERLIPIDEMIA: ICD-10-CM

## 2024-11-12 DIAGNOSIS — I10 ESSENTIAL (PRIMARY) HYPERTENSION: ICD-10-CM

## 2024-11-12 DIAGNOSIS — I45.10 RBBB: ICD-10-CM

## 2024-11-12 DIAGNOSIS — Z82.49 FAMILY HISTORY OF ABDOMINAL AORTIC ANEURYSM (AAA): ICD-10-CM

## 2024-11-12 DIAGNOSIS — R06.83 SNORES: ICD-10-CM

## 2024-11-12 RX ORDER — EZETIMIBE 10 MG/1
10 TABLET ORAL DAILY
Qty: 90 TABLET | Refills: 3 | Status: SHIPPED | OUTPATIENT
Start: 2024-11-12

## 2024-11-12 NOTE — PATIENT INSTRUCTIONS
Continue taking lisinopril 40mg daily, eliquis 5mg twice a day, metoprolol 25mg daily.   Schedule echo to assess heart structure and function  Schedule stress test   Referral to Dr. Suarez for sleep apnea evaluation   Start taking zetia 10mg daily. Patient verbalizes understanding of the need for treatment and education provided at today's visit. Additional education materials will be provided in the AVS.   Please obtain the following labs; -LIPID FASTING, LIPOPROTEIN A, CBC   Recommend obtaining knee high compression stockings. Wear these during the day and take off at night. Would also recommend elevating feet when sitting to help mobilize fluid.       Your provider has ordered testing for further evaluation.  An order/prescription has been included in your paper work.   To schedule outpatient testing, contact Central Scheduling by calling [address] [place] [address] ([phone]).

## 2024-12-13 RX ORDER — FLUTICASONE FUROATE AND VILANTEROL TRIFENATATE 100; 25 UG/1; UG/1
1 POWDER RESPIRATORY (INHALATION) DAILY
Qty: 60 EACH | Refills: 2 | Status: SHIPPED | OUTPATIENT
Start: 2024-12-13

## 2024-12-18 RX ORDER — METFORMIN HYDROCHLORIDE 500 MG/1
1000 TABLET, EXTENDED RELEASE ORAL 2 TIMES DAILY
Qty: 180 TABLET | Refills: 0 | Status: SHIPPED | OUTPATIENT
Start: 2024-12-18

## 2024-12-20 RX ORDER — METFORMIN HYDROCHLORIDE 500 MG/1
1000 TABLET, EXTENDED RELEASE ORAL 2 TIMES DAILY
Qty: 180 TABLET | Refills: 0 | OUTPATIENT
Start: 2024-12-20

## 2025-01-03 ENCOUNTER — HOSPITAL ENCOUNTER (OUTPATIENT)
Age: 75
Discharge: HOME | End: 2025-01-03
Attending: STUDENT IN AN ORGANIZED HEALTH CARE EDUCATION/TRAINING PROGRAM
Payer: MEDICARE

## 2025-01-03 ENCOUNTER — HOSPITAL ENCOUNTER (OUTPATIENT)
Age: 75
End: 2025-01-03
Attending: STUDENT IN AN ORGANIZED HEALTH CARE EDUCATION/TRAINING PROGRAM
Payer: MEDICARE

## 2025-01-03 ENCOUNTER — HOSPITAL ENCOUNTER (OUTPATIENT)
Dept: NUCLEAR MEDICINE | Age: 75
Discharge: HOME | End: 2025-01-03
Attending: STUDENT IN AN ORGANIZED HEALTH CARE EDUCATION/TRAINING PROGRAM
Payer: MEDICARE

## 2025-01-03 VITALS
DIASTOLIC BLOOD PRESSURE: 70 MMHG | WEIGHT: 230 LBS | SYSTOLIC BLOOD PRESSURE: 130 MMHG | BODY MASS INDEX: 34.86 KG/M2 | HEIGHT: 68 IN

## 2025-01-03 DIAGNOSIS — R06.02 SHORTNESS OF BREATH: ICD-10-CM

## 2025-01-03 DIAGNOSIS — I10 ESSENTIAL (PRIMARY) HYPERTENSION: ICD-10-CM

## 2025-01-03 DIAGNOSIS — R07.9 CHEST PAIN, UNSPECIFIED TYPE: ICD-10-CM

## 2025-01-03 DIAGNOSIS — Z79.899 MEDICATION MANAGEMENT: ICD-10-CM

## 2025-01-03 LAB
BASOPHILS # BLD: 0 K/UL (ref 0–0.2)
BASOPHILS NFR BLD: 0.6 %
CHOLEST SERPL-MCNC: 165 MG/DL (ref 0–199)
DEPRECATED RDW RBC AUTO: 14.1 % (ref 12.4–15.4)
ECHO AO ROOT DIAM: 3.4 CM
ECHO AO ROOT INDEX: 1.57 CM/M2
ECHO AV CUSP MM: 2 CM
ECHO AV PEAK GRADIENT: 11 MMHG
ECHO AV PEAK VELOCITY: 1.6 M/S
ECHO BSA: 2.24 M2
ECHO BSA: 2.24 M2
ECHO LA AREA 2C: 16.3 CM2
ECHO LA AREA 4C: 20.5 CM2
ECHO LA DIAMETER INDEX: 1.38 CM/M2
ECHO LA DIAMETER: 3 CM
ECHO LA MAJOR AXIS: 5.4 CM
ECHO LA MINOR AXIS: 5 CM
ECHO LA TO AORTIC ROOT RATIO: 0.88
ECHO LA VOL BP: 53 ML (ref 18–58)
ECHO LA VOL MOD A2C: 43 ML (ref 18–58)
ECHO LA VOL MOD A4C: 62 ML (ref 18–58)
ECHO LA VOL/BSA BIPLANE: 24 ML/M2 (ref 16–34)
ECHO LA VOLUME INDEX MOD A2C: 20 ML/M2 (ref 16–34)
ECHO LA VOLUME INDEX MOD A4C: 29 ML/M2 (ref 16–34)
ECHO LV E' LATERAL VELOCITY: 10.2 CM/S
ECHO LV E' SEPTAL VELOCITY: 7.4 CM/S
ECHO LV EF PHYSICIAN: 58 %
ECHO LV FRACTIONAL SHORTENING: 60 % (ref 28–44)
ECHO LV INTERNAL DIMENSION DIASTOLE INDEX: 2.4 CM/M2
ECHO LV INTERNAL DIMENSION DIASTOLIC: 5.2 CM (ref 4.2–5.9)
ECHO LV INTERNAL DIMENSION SYSTOLIC INDEX: 0.97 CM/M2
ECHO LV INTERNAL DIMENSION SYSTOLIC: 2.1 CM
ECHO LV ISOVOLUMETRIC RELAXATION TIME (IVRT): 63 MS
ECHO LV IVSD: 1.1 CM (ref 0.6–1)
ECHO LV MASS 2D: 248.8 G (ref 88–224)
ECHO LV MASS INDEX 2D: 114.7 G/M2 (ref 49–115)
ECHO LV POSTERIOR WALL DIASTOLIC: 1.3 CM (ref 0.6–1)
ECHO LV RELATIVE WALL THICKNESS RATIO: 0.5
ECHO MV A VELOCITY: 1.03 M/S
ECHO MV E VELOCITY: 0.69 M/S
ECHO MV E/A RATIO: 0.67
ECHO MV E/E' LATERAL: 6.76
ECHO MV E/E' RATIO (AVERAGED): 8.04
ECHO MV E/E' SEPTAL: 9.32
ECHO PV MAX VELOCITY: 1 M/S
ECHO PV PEAK GRADIENT: 4 MMHG
ECHO RA AREA 4C: 14.4 CM2
ECHO RA END SYSTOLIC VOLUME APICAL 4 CHAMBER INDEX BSA: 17 ML/M2
ECHO RA VOLUME: 36 ML
ECHO RV BASAL DIMENSION: 3.6 CM
ECHO RV FREE WALL PEAK S': 13.7 CM/S
ECHO RV LONGITUDINAL DIMENSION: 7.5 CM
ECHO RV MID DIMENSION: 2.1 CM
ECHO RV TAPSE: 1.9 CM (ref 1.7–?)
ECHO TV PEAK GRADIENT: 2 MMHG
EOSINOPHIL # BLD: 0.2 K/UL (ref 0–0.6)
EOSINOPHIL NFR BLD: 2.9 %
HCT VFR BLD AUTO: 41.4 % (ref 40.5–52.5)
HDLC SERPL-MCNC: 32 MG/DL (ref 40–60)
HGB BLD-MCNC: 13.6 G/DL (ref 13.5–17.5)
LDLC SERPL CALC-MCNC: ABNORMAL MG/DL
LDLC SERPL-MCNC: 82 MG/DL
LYMPHOCYTES # BLD: 1.6 K/UL (ref 1–5.1)
LYMPHOCYTES NFR BLD: 27.8 %
MCH RBC QN AUTO: 33.2 PG (ref 26–34)
MCHC RBC AUTO-ENTMCNC: 32.9 G/DL (ref 31–36)
MCV RBC AUTO: 100.8 FL (ref 80–100)
MONOCYTES # BLD: 0.5 K/UL (ref 0–1.3)
MONOCYTES NFR BLD: 8 %
NEUTROPHILS # BLD: 3.4 K/UL (ref 1.7–7.7)
NEUTROPHILS NFR BLD: 60.7 %
NUC STRESS EJECTION FRACTION: 63 %
NUC STRESS LV EDV: 75 ML (ref 67–155)
NUC STRESS LV ESV: 28 ML (ref 22–58)
NUC STRESS LV MASS: 116 G
PLATELET # BLD AUTO: 139 K/UL (ref 135–450)
PMV BLD AUTO: 9.9 FL (ref 5–10.5)
RBC # BLD AUTO: 4.11 M/UL (ref 4.2–5.9)
STRESS BASELINE DIAS BP: 75 MMHG
STRESS BASELINE HR: 79 BPM
STRESS BASELINE SYS BP: 142 MMHG
STRESS ESTIMATED WORKLOAD: 4.6 METS
STRESS EXERCISE DUR MIN: 5 MIN
STRESS EXERCISE DUR SEC: 1 SEC
STRESS PEAK DIAS BP: 79 MMHG
STRESS PEAK SYS BP: 189 MMHG
STRESS PERCENT HR ACHIEVED: 94 %
STRESS POST PEAK HR: 137 BPM
STRESS RATE PRESSURE PRODUCT: NORMAL BPM*MMHG
STRESS TARGET HR: 146 BPM
TRIGL SERPL-MCNC: 364 MG/DL (ref 0–150)
VLDLC SERPL CALC-MCNC: ABNORMAL MG/DL
WBC # BLD AUTO: 5.7 K/UL (ref 4–11)

## 2025-01-03 PROCEDURE — 78452 HT MUSCLE IMAGE SPECT MULT: CPT

## 2025-01-03 PROCEDURE — 3430000000 HC RX DIAGNOSTIC RADIOPHARMACEUTICAL: Performed by: STUDENT IN AN ORGANIZED HEALTH CARE EDUCATION/TRAINING PROGRAM

## 2025-01-03 PROCEDURE — A9502 TC99M TETROFOSMIN: HCPCS | Performed by: STUDENT IN AN ORGANIZED HEALTH CARE EDUCATION/TRAINING PROGRAM

## 2025-01-03 PROCEDURE — 93306 TTE W/DOPPLER COMPLETE: CPT

## 2025-01-03 PROCEDURE — 85025 COMPLETE CBC W/AUTO DIFF WBC: CPT

## 2025-01-03 PROCEDURE — 36415 COLL VENOUS BLD VENIPUNCTURE: CPT

## 2025-01-03 PROCEDURE — 83695 ASSAY OF LIPOPROTEIN(A): CPT

## 2025-01-03 PROCEDURE — 93306 TTE W/DOPPLER COMPLETE: CPT | Performed by: INTERNAL MEDICINE

## 2025-01-03 PROCEDURE — 93017 CV STRESS TEST TRACING ONLY: CPT

## 2025-01-03 PROCEDURE — 80061 LIPID PANEL: CPT

## 2025-01-03 RX ADMIN — TETROFOSMIN 30.1 MILLICURIE: 1.38 INJECTION, POWDER, LYOPHILIZED, FOR SOLUTION INTRAVENOUS at 11:00

## 2025-01-03 RX ADMIN — TETROFOSMIN 10.5 MILLICURIE: 1.38 INJECTION, POWDER, LYOPHILIZED, FOR SOLUTION INTRAVENOUS at 10:06

## 2025-01-03 NOTE — NURSING NOTE
Patient arrived to stress lab for myoview stress test. Patient was educated on procedure, all questions answered, and consent verified.

## 2025-01-03 NOTE — NURSING NOTE
Patient completed stress portion of cardiac stress test. Pt is discharged to nuclear department for final scan, nuclear tech will remove PIV. Discharge instructions given, pt verbalized understanding to discharge instructions.

## 2025-01-05 LAB — LPA SERPL-MCNC: 55 MG/DL

## 2025-01-06 DIAGNOSIS — E78.1 HYPERTRIGLYCERIDEMIA: Primary | ICD-10-CM

## 2025-01-06 RX ORDER — FENOFIBRATE 160 MG/1
160 TABLET ORAL DAILY
Qty: 30 TABLET | Refills: 5 | Status: SHIPPED | OUTPATIENT
Start: 2025-01-06

## 2025-01-14 NOTE — PROGRESS NOTES
Will continue to monitor clinically with current plan. Will escalate treatment plan as needed.    2. Hypertension: good overall blood pressure control    3. Suspected obstructive sleep apnea: patient has suspected obstructive sleep apnea (based on symptoms and other factors). Given the presence of atrial fibrillation, I discussed with the patient the importance of getting formally tested for obstructive sleep apnea as part of the management of atrial fibrillation. If significant obstructive sleep apnea is found, then treatment would be very important to help with the management of the arrhythmia issues and overall heart health. Patient had his questions answered to his satisfaction. We will help with arrangements for testing.     4. Obesity: on treatment to lose weight. Also doing dieting. Encouraged adding regular exercise to augment weight loss.     Plan:     Monitor for recurrence of AF with Apple Watch.   Proceed with sleep apnea testing and treatment if indicated.   Continue taking current cardiac medications as prescribed.   Follow up with me in 6 months.     Subjective:     Patient ID: Greg Parekh is a 74 y.o. male.    Chief Complaint:  Chief Complaint   Patient presents with    New Patient    Other     RBBB    Shortness of Breath     HPI    Patient is a pleasant 74 y.o. male who presents for evaluation of atrial fibrillation. The patient has a past medical history of atrial fibrillation, hypertension, hyperlipidemia, diabetes, and chronic kidney disease. Echocardiogram 09/27/2021 EF of 55-60%, grade I DD, mild MR, AR. Stress test 09/27/2021 showed a mild inferior defect consistent with attenuation artifact. There is no evidence for ischemia. Patient noted AF on his Apple Watch in 07/2023. Patient wore a cardiac event monitor from 06/20/2024 to 07/19/2024 which demonstrated predominately sinus rhythm with an average HR of 87 () BPM.     Office Visit ([facility], 01/15/2025):  He presents to the

## 2025-01-15 ENCOUNTER — HOSPITAL ENCOUNTER (EMERGENCY)
Age: 75
Discharge: HOME | End: 2025-01-15
Attending: EMERGENCY MEDICINE
Payer: OTHER GOVERNMENT

## 2025-01-15 ENCOUNTER — APPOINTMENT (OUTPATIENT)
Dept: CT IMAGING | Age: 75
End: 2025-01-15
Payer: OTHER GOVERNMENT

## 2025-01-15 ENCOUNTER — OFFICE VISIT (OUTPATIENT)
Dept: CARDIOLOGY CLINIC | Age: 75
End: 2025-01-15
Payer: MEDICARE

## 2025-01-15 VITALS
HEIGHT: 68 IN | SYSTOLIC BLOOD PRESSURE: 150 MMHG | OXYGEN SATURATION: 98 % | TEMPERATURE: 97.7 F | BODY MASS INDEX: 32.28 KG/M2 | DIASTOLIC BLOOD PRESSURE: 70 MMHG | RESPIRATION RATE: 16 BRPM | HEART RATE: 75 BPM | WEIGHT: 213 LBS

## 2025-01-15 VITALS
WEIGHT: 217.6 LBS | OXYGEN SATURATION: 98 % | HEART RATE: 72 BPM | HEIGHT: 68 IN | DIASTOLIC BLOOD PRESSURE: 64 MMHG | BODY MASS INDEX: 32.98 KG/M2 | SYSTOLIC BLOOD PRESSURE: 122 MMHG

## 2025-01-15 DIAGNOSIS — I45.10 RBBB: ICD-10-CM

## 2025-01-15 DIAGNOSIS — I48.0 PAF (PAROXYSMAL ATRIAL FIBRILLATION) (HCC): Primary | ICD-10-CM

## 2025-01-15 DIAGNOSIS — S01.01XA LACERATION OF SCALP, INITIAL ENCOUNTER: Primary | ICD-10-CM

## 2025-01-15 DIAGNOSIS — I10 PRIMARY HYPERTENSION: ICD-10-CM

## 2025-01-15 DIAGNOSIS — E66.811 OBESITY (BMI 30.0-34.9): ICD-10-CM

## 2025-01-15 DIAGNOSIS — R29.818 SUSPECTED SLEEP APNEA: ICD-10-CM

## 2025-01-15 PROCEDURE — G8427 DOCREV CUR MEDS BY ELIG CLIN: HCPCS | Performed by: INTERNAL MEDICINE

## 2025-01-15 PROCEDURE — 1159F MED LIST DOCD IN RCRD: CPT | Performed by: INTERNAL MEDICINE

## 2025-01-15 PROCEDURE — 93000 ELECTROCARDIOGRAM COMPLETE: CPT | Performed by: INTERNAL MEDICINE

## 2025-01-15 PROCEDURE — 3074F SYST BP LT 130 MM HG: CPT | Performed by: INTERNAL MEDICINE

## 2025-01-15 PROCEDURE — 1123F ACP DISCUSS/DSCN MKR DOCD: CPT | Performed by: INTERNAL MEDICINE

## 2025-01-15 PROCEDURE — 12002 RPR S/N/AX/GEN/TRNK2.6-7.5CM: CPT

## 2025-01-15 PROCEDURE — 99204 OFFICE O/P NEW MOD 45 MIN: CPT | Performed by: INTERNAL MEDICINE

## 2025-01-15 PROCEDURE — 3078F DIAST BP <80 MM HG: CPT | Performed by: INTERNAL MEDICINE

## 2025-01-15 PROCEDURE — 99284 EMERGENCY DEPT VISIT MOD MDM: CPT

## 2025-01-15 PROCEDURE — 70450 CT HEAD/BRAIN W/O DYE: CPT

## 2025-01-15 PROCEDURE — 3017F COLORECTAL CA SCREEN DOC REV: CPT | Performed by: INTERNAL MEDICINE

## 2025-01-15 PROCEDURE — G8417 CALC BMI ABV UP PARAM F/U: HCPCS | Performed by: INTERNAL MEDICINE

## 2025-01-15 PROCEDURE — 1036F TOBACCO NON-USER: CPT | Performed by: INTERNAL MEDICINE

## 2025-01-15 ASSESSMENT — ENCOUNTER SYMPTOMS
SNORING: 1
RIGHT EYE: 0
LEFT EYE: 0
HEMATEMESIS: 0
SHORTNESS OF BREATH: 1
STRIDOR: 0
HEMATOCHEZIA: 0
WHEEZING: 0

## 2025-01-15 ASSESSMENT — PAIN - FUNCTIONAL ASSESSMENT
PAIN_FUNCTIONAL_ASSESSMENT: 0-10
PAIN_FUNCTIONAL_ASSESSMENT: NONE - DENIES PAIN

## 2025-01-15 ASSESSMENT — PAIN DESCRIPTION - LOCATION: LOCATION: HEAD

## 2025-01-15 ASSESSMENT — PAIN SCALES - GENERAL: PAINLEVEL_OUTOF10: 2

## 2025-01-15 ASSESSMENT — PAIN DESCRIPTION - DESCRIPTORS: DESCRIPTORS: DISCOMFORT

## 2025-01-15 ASSESSMENT — LIFESTYLE VARIABLES
HOW MANY STANDARD DRINKS CONTAINING ALCOHOL DO YOU HAVE ON A TYPICAL DAY: PATIENT DOES NOT DRINK
HOW OFTEN DO YOU HAVE A DRINK CONTAINING ALCOHOL: NEVER

## 2025-01-15 NOTE — PATIENT INSTRUCTIONS
Plan:     Monitor for recurrence of AF with Apple Watch.   Proceed with sleep apnea testing and treatment if indicated.   Continue taking current cardiac medications as prescribed.   Follow up with me in 6 months.

## 2025-01-15 NOTE — ED PROVIDER NOTES
[facility] EMERGENCY DEPARTMENT  EMERGENCY DEPARTMENT ENCOUNTER        Pt Name: Greg Parekh  MRN: [id number]  Birthdate 1950  Date of evaluation: 1/15/2025  Provider: CASSI Goodman  PCP: Andrei Asher MD  Note Started: 1:33 PM EST 1/15/25       I have seen and evaluated this patient with my supervising physician Dr. Douglas       CHIEF COMPLAINT       Chief Complaint   Patient presents with    Laceration     Pt arrives from home with lac to head. Pt states he hit his head on one of his slide outs to his camper. Pt denies LOC +blood thinners. Bleeding controlled at this time        HISTORY OF PRESENT ILLNESS: 1 or more Elements     History From: Patient    Limitations to history : None    Social Determinants Significantly Affecting Health : None    Chief Complaint: Laceration    Greg Parekh is a 74 y.o. male who presents to the emergency department for a laceration on the top of his scalp.  States that he was walking outside and hit his head on the corner of a sign out for a camper.  He is on Eliquis.  He denies loss of consciousness, vision changes, headache, numbness or weakness in extremities, neck pain.  Has not tried taking any medication for pain.  Tetanus is up-to-date.    Nursing Notes were all reviewed and agreed with or any disagreements were addressed in the HPI.    REVIEW OF SYSTEMS :      Review of Systems    Positives and Pertinent negatives as per HPI.     SURGICAL HISTORY     Past Surgical History:   Procedure Laterality Date    ACHILLES TENDON SURGERY      ruptured achilles tendon    ARTHROGRAPHY Right 09/29/2020    ARTHROGRAM AND CORTISONE INJECTION RIGHT HIP performed by Julian Dunn MD at [facility] OR    BACK SURGERY      COLONOSCOPY  2007    normal per pt    EYE SURGERY  2018?    Macular hole, Catarac, left eye    JOINT REPLACEMENT  Hip 2021    SHOULDER SURGERY      TONGUE SURGERY      ectactic vessel and thrombus    TOTAL HIP ARTHROPLASTY    explored through full range of motion and entire depth of wound visualized      Contaminated: no    Treatment:     Area cleansed with:  Chlorhexidine and saline    Amount of cleaning:  Standard    Irrigation solution:  Sterile saline    Irrigation method:  Tap    Visualized foreign bodies/material removed: no      Debridement:  None    Undermining:  None    Scar revision: no    Skin repair:     Repair method:  Staples    Number of staples:  8  Approximation:     Approximation:  Close  Repair type:     Repair type:  Simple  Post-procedure details:     Dressing:  Antibiotic ointment and non-adherent dressing    Procedure completion:  Tolerated well, no immediate complications      CRITICAL CARE TIME (.cctime)         EMERGENCY DEPARTMENT COURSE and DIFFERENTIAL DIAGNOSIS/MDM:   Vitals:    Vitals:    01/15/25 1336   BP: (!) 150/70   Pulse: 75   Resp: 16   Temp: 97.7 °F (36.5 °C)   TempSrc: Oral   SpO2: 98%   Weight: 96.6 kg (213 lb)   Height: 1.727 m (5' 8\")       Patient was given the following medications:  Medications - No data to display          Is this patient to be included in the SEP-1 Core Measure due to severe sepsis or septic shock?   No   Exclusion criteria - the patient is NOT to be included for SEP-1 Core Measure due to:  Infection is not suspected      Chronic Conditions affecting care:    has a past medical history of Allergic rhinitis, BPH, Colitis, ED (erectile dysfunction) (08/05/2013), ED (erectile dysfunction), Gout involving toe of right foot, Hyperlipidemia, Hyperlipidemia with target LDL less than 100, Hypertension, Hypothyroidism (unknown), Morbidly obese (08/26/2020), Type 2 diabetes mellitus without complication (HCC), and Type II or unspecified type diabetes mellitus without mention of complication, not stated as uncontrolled.    CONSULTS: (Who and What was discussed)  None      Records Reviewed (External and Source)   Laceration 3/9/2023 patient presented for a laceration, tetanus was

## 2025-01-15 NOTE — DISCHARGE INSTRUCTIONS
.Please follow-up with your primary care provider in 1 week for reevaluation and to have staples removed.  Please keep wound clean and dry.  Do not submerge head in water such as pools or hot tubs..  Take over-the-counter Tylenol (1000 mg up to 3 times a day) as needed for pain relief.  Return to this department for any new or worsening symptoms including increased redness, swelling, purulent drainage, fevers, severe headaches, vision/speech changes, numbness/weakness in extremities.

## 2025-01-17 ENCOUNTER — TELEPHONE (OUTPATIENT)
Dept: SLEEP CENTER | Age: 75
End: 2025-01-17

## 2025-01-17 ENCOUNTER — TELEPHONE (OUTPATIENT)
Dept: SLEEP MEDICINE | Age: 75
End: 2025-01-17

## 2025-01-17 ENCOUNTER — OFFICE VISIT (OUTPATIENT)
Dept: PULMONOLOGY | Age: 75
End: 2025-01-17
Payer: OTHER GOVERNMENT

## 2025-01-17 VITALS
HEIGHT: 68 IN | DIASTOLIC BLOOD PRESSURE: 80 MMHG | BODY MASS INDEX: 33.1 KG/M2 | OXYGEN SATURATION: 96 % | RESPIRATION RATE: 16 BRPM | HEART RATE: 78 BPM | SYSTOLIC BLOOD PRESSURE: 132 MMHG | WEIGHT: 218.4 LBS

## 2025-01-17 DIAGNOSIS — R06.83 SNORING: Primary | ICD-10-CM

## 2025-01-17 DIAGNOSIS — R53.83 OTHER FATIGUE: ICD-10-CM

## 2025-01-17 DIAGNOSIS — I10 ESSENTIAL HYPERTENSION: ICD-10-CM

## 2025-01-17 DIAGNOSIS — E66.9 OBESITY (BMI 30-39.9): ICD-10-CM

## 2025-01-17 DIAGNOSIS — G47.30 OBSERVED SLEEP APNEA: ICD-10-CM

## 2025-01-17 PROCEDURE — 3075F SYST BP GE 130 - 139MM HG: CPT | Performed by: NURSE PRACTITIONER

## 2025-01-17 PROCEDURE — 99244 OFF/OP CNSLTJ NEW/EST MOD 40: CPT | Performed by: NURSE PRACTITIONER

## 2025-01-17 PROCEDURE — 3079F DIAST BP 80-89 MM HG: CPT | Performed by: NURSE PRACTITIONER

## 2025-01-17 ASSESSMENT — SLEEP AND FATIGUE QUESTIONNAIRES
HOW LIKELY ARE YOU TO NOD OFF OR FALL ASLEEP WHILE WATCHING TV: SLIGHT CHANCE OF DOZING
HOW LIKELY ARE YOU TO NOD OFF OR FALL ASLEEP WHILE LYING DOWN TO REST IN THE AFTERNOON WHEN CIRCUMSTANCES PERMIT: MODERATE CHANCE OF DOZING
HOW LIKELY ARE YOU TO NOD OFF OR FALL ASLEEP WHILE SITTING QUIETLY AFTER LUNCH WITHOUT ALCOHOL: WOULD NEVER DOZE
HOW LIKELY ARE YOU TO NOD OFF OR FALL ASLEEP WHILE SITTING INACTIVE IN A PUBLIC PLACE: WOULD NEVER DOZE
ESS TOTAL SCORE: 3
HOW LIKELY ARE YOU TO NOD OFF OR FALL ASLEEP WHEN YOU ARE A PASSENGER IN A CAR FOR AN HOUR WITHOUT A BREAK: WOULD NEVER DOZE
HOW LIKELY ARE YOU TO NOD OFF OR FALL ASLEEP WHILE SITTING AND TALKING TO SOMEONE: WOULD NEVER DOZE
HOW LIKELY ARE YOU TO NOD OFF OR FALL ASLEEP IN A CAR, WHILE STOPPED FOR A FEW MINUTES IN TRAFFIC: WOULD NEVER DOZE
HOW LIKELY ARE YOU TO NOD OFF OR FALL ASLEEP WHILE SITTING AND READING: WOULD NEVER DOZE

## 2025-01-17 NOTE — PROGRESS NOTES
diarrhea   Genitourinary: Negative for hematuria   Musculoskeletal: +arthralgias   Skin: Negative for rash  Neurological: Negative for syncope  Hematological: Negative for adenopathy  Psychiatric/Behavorial: Negative for anxiety      Objective:   PHYSICAL EXAM:  /80 (Site: Right Upper Arm, Position: Sitting, Cuff Size: Large Adult)   Pulse 78   Resp 16   Ht 1.727 m (5' 8\")   Wt 99.1 kg (218 lb 6.4 oz)   SpO2 96%   BMI 33.21 kg/m²     Physical Exam  Gen: No acute distress. Obese. BMI of 33.21  Eyes: PERRL. No sclera icterus. No conjunctival injection.   ENT: No discharge. Pharynx clear. Mallampati class III  Neck: Trachea midline. No obvious mass. Neck circumference 16.5\"  Resp: No accessory muscle use. Nocrackles. No wheezes. No rhonchi.  CV: Regular rate. Regular rhythm. No murmur or rub.   Skin: Warm and dry   M/S: No cyanosis. No obvious joint deformity.   Neuro: Awake. Alert. Moves all four extremities.   Psych: Oriented x 3. No anxiety.       DATA:   6/14/2024 TSH 2.30  1/3/2025 echo EF 55-60%    Assessment:       Snoring  Observed sleep apnea   Fatigue  Obesity  Hypertension and paroxysmal [id number] by cardiology  ?COPD- taking breo per PCP        Plan:      PSG to evaluate forsleep related breathing disorder.   Treatment options were discussed with patient if PSG reveals KRUT, including CPAP therapy, oral appliances and upper airway surgery.  Sleep hygiene  D/W patient non pharmacological therapy for RLS including avoiding aggravating factors (sleep deprivation, mental alerting activities at time of rest, antihistamines), moderate regular exercise, leg message and heating pads/hot shower.     Avoidsedatives, alcohol and caffeinated drinks at bed time.  No driving motorized vehicles or operating heavy machinery while fatigue, drowsy or sleepy.   Weight loss is also recommended as a long-term intervention.    Complications of KURT if not treated were discussed with patient patient to include

## 2025-01-17 NOTE — PATIENT INSTRUCTIONS
[facility]                   The [facility]                     [address], [address], [place] [address]                  The [facility]                   [address], [address], [place] [address]                      Phone: [phone] Fax: [phone]                Dear Sleep Center Patient,     For in-lab testing please, bring with you:  Appropriate nightclothes (pajamas, sweats, etc.), slippers and robe  All medications you will need during you stay, including any breathing medications, nebulizers and metered dose inhalers  Your own toiletries and hairdryer if you wish to shower before you leave  Current photo I.D. and Insurance card  We do not allow any pillows or bed linens from home due to health regulations  -We recommend that you not bring valuables with you to the Sleep Center, as we cannot be responsible for any lost or damaged personal items.  Please be aware that [facility] is a non-smoking facility. There is no smoking allowed anywhere on [facility] property at any time.  Each patient room is a private room with a private bathroom.  The in-lab test itself consist of electrodes and sensors attached to specific areas of your scalp, face, chest and legs. We will also monitor respiratory effort, nasal and oral airflow and oxygen levels. The test will not hurt- it is painless and not invasive in any way.      At home testing when you come to  the rental unit, please bring:   -I.D. and Insurance card  **Please note the Home Sleep Test Units are limited. It is a 1 night rental and must be dropped off the following day to ensure you are not billed a late or replacement fee**    Please refrain from or reduce the use of caffeine and/or alcohol prior to your sleep study and avoid napping the day of your study, as these will affect the accuracy of your test results.  If you are ill the day of your test

## 2025-01-17 NOTE — TELEPHONE ENCOUNTER
Pt had appt with KW today 1/17/25. Sleep study ordered and sent to the Sleep Lab. Provided pt with Sleep Lab info at appt.

## 2025-01-18 SDOH — ECONOMIC STABILITY: FOOD INSECURITY: WITHIN THE PAST 12 MONTHS, YOU WORRIED THAT YOUR FOOD WOULD RUN OUT BEFORE YOU GOT MONEY TO BUY MORE.: NEVER TRUE

## 2025-01-18 SDOH — ECONOMIC STABILITY: FOOD INSECURITY: WITHIN THE PAST 12 MONTHS, THE FOOD YOU BOUGHT JUST DIDN'T LAST AND YOU DIDN'T HAVE MONEY TO GET MORE.: NEVER TRUE

## 2025-01-18 SDOH — ECONOMIC STABILITY: INCOME INSECURITY: IN THE LAST 12 MONTHS, WAS THERE A TIME WHEN YOU WERE NOT ABLE TO PAY THE MORTGAGE OR RENT ON TIME?: NO

## 2025-01-18 SDOH — ECONOMIC STABILITY: TRANSPORTATION INSECURITY
IN THE PAST 12 MONTHS, HAS THE LACK OF TRANSPORTATION KEPT YOU FROM MEDICAL APPOINTMENTS OR FROM GETTING MEDICATIONS?: NO

## 2025-01-18 ASSESSMENT — PATIENT HEALTH QUESTIONNAIRE - PHQ9
SUM OF ALL RESPONSES TO PHQ QUESTIONS 1-9: 0
SUM OF ALL RESPONSES TO PHQ QUESTIONS 1-9: 0
SUM OF ALL RESPONSES TO PHQ9 QUESTIONS 1 & 2: 0
2. FEELING DOWN, DEPRESSED OR HOPELESS: NOT AT ALL
2. FEELING DOWN, DEPRESSED OR HOPELESS: NOT AT ALL
1. LITTLE INTEREST OR PLEASURE IN DOING THINGS: NOT AT ALL
SUM OF ALL RESPONSES TO PHQ9 QUESTIONS 1 & 2: 0
SUM OF ALL RESPONSES TO PHQ QUESTIONS 1-9: 0
SUM OF ALL RESPONSES TO PHQ QUESTIONS 1-9: 0
1. LITTLE INTEREST OR PLEASURE IN DOING THINGS: NOT AT ALL

## 2025-01-21 ENCOUNTER — OFFICE VISIT (OUTPATIENT)
Dept: INTERNAL MEDICINE CLINIC | Age: 75
End: 2025-01-21

## 2025-01-21 VITALS
WEIGHT: 221 LBS | DIASTOLIC BLOOD PRESSURE: 78 MMHG | RESPIRATION RATE: 14 BRPM | SYSTOLIC BLOOD PRESSURE: 128 MMHG | HEART RATE: 62 BPM | BODY MASS INDEX: 33.49 KG/M2 | HEIGHT: 68 IN

## 2025-01-21 DIAGNOSIS — Z48.02 ENCOUNTER FOR STAPLE REMOVAL: Primary | ICD-10-CM

## 2025-01-21 PROCEDURE — 99212 OFFICE O/P EST SF 10 MIN: CPT | Performed by: NURSE PRACTITIONER

## 2025-01-21 PROCEDURE — 3074F SYST BP LT 130 MM HG: CPT | Performed by: NURSE PRACTITIONER

## 2025-01-21 PROCEDURE — 1123F ACP DISCUSS/DSCN MKR DOCD: CPT | Performed by: NURSE PRACTITIONER

## 2025-01-21 PROCEDURE — 3078F DIAST BP <80 MM HG: CPT | Performed by: NURSE PRACTITIONER

## 2025-01-21 NOTE — PROGRESS NOTES
Chief Complaint:   Greg Parekh is a 74 y.o. male who presents for   Chief Complaint   Patient presents with    Suture / Staple Removal       Suture / Staple Removal        Patient presents for staple removal to his head.  He hit his head on camper and cut his head.       Review of Systems  Review of Systems      Allergies  Augmentin [amoxicillin-pot clavulanate], Bactrim [sulfamethoxazole-trimethoprim], Simvastatin, Statins, and Sulfa antibiotics      Vitals  /78 (Site: Right Upper Arm, Position: Sitting, Cuff Size: Large Adult)   Pulse 62   Resp 14   Ht 1.727 m (5' 8\")   Wt 100.2 kg (221 lb)   BMI 33.60 kg/m²     Current Medications  Current Outpatient Medications   Medication Sig Dispense Refill    fenofibrate 160 MG tablet Take 1 tablet by mouth daily 30 tablet 5    metFORMIN (GLUCOPHAGE-XR) 500 MG extended release tablet TAKE 2 TABLETS BY MOUTH IN THE MORNING AND AT BEDTIME 180 tablet 0    BREO ELLIPTA 100-25 MCG/ACT inhaler INHALE 1 PUFF INTO THE LUNGS DAILY 60 each 2    ezetimibe (ZETIA) 10 MG tablet Take 1 tablet by mouth daily 90 tablet 3    lisinopril (PRINIVIL;ZESTRIL) 40 MG tablet Take 1 tablet by mouth daily 90 tablet 1    Tirzepatide 2.5 MG/0.5ML SOAJ Inject 2.5 mg into the skin once a week 6 mL 0    apixaban (ELIQUIS) 5 MG TABS tablet Take 1 tablet by mouth 2 times daily 60 tablet 2    metoprolol succinate (TOPROL XL) 25 MG extended release tablet Take 1 tablet by mouth daily 30 tablet 5    gabapentin (NEURONTIN) 600 MG tablet Take 1 tablet by mouth 3 times daily for 180 days. (Patient taking differently: Take 1 tablet by mouth 2 times daily.) 270 tablet 1    fluticasone (FLONASE) 50 MCG/ACT nasal spray 2 sprays by Each Nostril route daily 3 each 3    allopurinol (ZYLOPRIM) 300 MG tablet Take 1 tablet by mouth daily 90 tablet 3    fluocinonide (LIDEX) 0.05 % ointment Apply topically daily Indications: 30 gm Apply topically 2 times daily.       Compression Stockings MISC by Does not

## 2025-01-30 DIAGNOSIS — E66.812 CLASS 2 SEVERE OBESITY WITH BODY MASS INDEX (BMI) OF 35 TO 39.9 WITH SERIOUS COMORBIDITY: ICD-10-CM

## 2025-01-30 DIAGNOSIS — E66.01 CLASS 2 SEVERE OBESITY WITH BODY MASS INDEX (BMI) OF 35 TO 39.9 WITH SERIOUS COMORBIDITY: ICD-10-CM

## 2025-01-30 DIAGNOSIS — E11.43 CONTROLLED TYPE 2 DIABETES MELLITUS WITH DIABETIC AUTONOMIC NEUROPATHY, WITHOUT LONG-TERM CURRENT USE OF INSULIN (HCC): ICD-10-CM

## 2025-02-09 ENCOUNTER — HOSPITAL ENCOUNTER (OUTPATIENT)
Dept: SLEEP CENTER | Age: 75
Discharge: HOME | End: 2025-02-11
Payer: MEDICARE

## 2025-02-09 DIAGNOSIS — I10 ESSENTIAL HYPERTENSION: ICD-10-CM

## 2025-02-09 DIAGNOSIS — R06.83 SNORING: ICD-10-CM

## 2025-02-09 DIAGNOSIS — G47.30 OBSERVED SLEEP APNEA: ICD-10-CM

## 2025-02-09 DIAGNOSIS — R53.83 OTHER FATIGUE: ICD-10-CM

## 2025-02-09 PROCEDURE — 95810 POLYSOM 6/> YRS 4/> PARAM: CPT

## 2025-02-10 RX ORDER — METFORMIN HYDROCHLORIDE 500 MG/1
1000 TABLET, EXTENDED RELEASE ORAL 2 TIMES DAILY
Qty: 360 TABLET | OUTPATIENT
Start: 2025-02-10

## 2025-02-10 RX ORDER — METFORMIN HYDROCHLORIDE 500 MG/1
1000 TABLET, EXTENDED RELEASE ORAL 2 TIMES DAILY
Qty: 180 TABLET | Refills: 0 | Status: SHIPPED | OUTPATIENT
Start: 2025-02-10

## 2025-02-11 PROBLEM — R06.83 SNORING: Status: ACTIVE | Noted: 2025-02-11

## 2025-02-13 ENCOUNTER — TELEPHONE (OUTPATIENT)
Dept: CARDIOLOGY CLINIC | Age: 75
End: 2025-02-13

## 2025-02-13 NOTE — TELEPHONE ENCOUNTER
Pt came to [facility] office for appt with AMP today. Trying to reschedule appt for pt and would like to know if we can use one of the 2/28/25 urgent hold slots? I informed pt that we mehnaz call him back to rs this appt at [phone].

## 2025-02-25 RX ORDER — METOPROLOL SUCCINATE 25 MG/1
25 TABLET, EXTENDED RELEASE ORAL DAILY
Qty: 30 TABLET | Refills: 5 | Status: SHIPPED | OUTPATIENT
Start: 2025-02-25

## 2025-02-27 NOTE — PROGRESS NOTES
CARDIOLOGY OFFICE NOTE        Patient Name: Greg Parekh  Primary Care physician: Andrei Asher MD    Reason for Referral/Chief Complaint: Greg Parekh is a 74 y.o. patient who is referred to cardiology clinic today for evaluation and treatment of Afib.     History of Present Illness:   Greg Parekh is a 74 y.o. male with a pmhx of PAF diagnosed in 2023, COPD, DM II, HTN, HLD, CKD 3A. He is referred by Andrei Asher MD for afib. Prior cardiac testing includes; echo 9/27/21 EF of 55-60%, grade I DD, mild MR, AR. 9/27/21 stress test showed a mild inferior defect consistent with attenuation artifact. There is no evidence for ischemia.    Patient wore a cardiac event monitor from 6/20 to 7/19/24 which demonstrated predominately sinus rhythm with an average HR of 87 () BPM.   OV 11/12/24 he says \"I'm not having chest pain anymore\" he says that prior he was having pains after eating in middle of his chest for about a year. He says it feels like food will get stuck. However he was placed on a medication that he does not recall the name of and it seems to help. Patient reports shortness of breath that has gotten increasingly worse over the past year. He is short of breath with exertion. \" I associated this with the RSV shot that I got last year because this has gotten worse since the vaccine.\" He reports a 10lb weight gain within the last 4 months.  He equates this with being more sedentary over the past year.  He reports his apple watch will tell him when his heart is racing. Prior to being put on eliquis and metoprolol his heart rate would be in the 150's. Last year 7/2023 his BP monitor showed an irregular HR and he put on his apple watch which confirmed AFIBB. (Dr. Asher saw those tracings). He has symptoms at times of dizziness/lightheadedness and feeling like he is going to pass out. \"I have fallen a lot, I am guessing it is from gabapentin due to my neuropathy\". Last fall 4

## 2025-02-28 ENCOUNTER — OFFICE VISIT (OUTPATIENT)
Dept: CARDIOLOGY CLINIC | Age: 75
End: 2025-02-28
Payer: MEDICARE

## 2025-02-28 VITALS
WEIGHT: 218 LBS | OXYGEN SATURATION: 99 % | HEART RATE: 72 BPM | BODY MASS INDEX: 33.04 KG/M2 | HEIGHT: 68 IN | SYSTOLIC BLOOD PRESSURE: 126 MMHG | DIASTOLIC BLOOD PRESSURE: 62 MMHG

## 2025-02-28 DIAGNOSIS — Z82.49 FAMILY HISTORY OF ABDOMINAL AORTIC ANEURYSM (AAA): ICD-10-CM

## 2025-02-28 DIAGNOSIS — I48.0 PAF (PAROXYSMAL ATRIAL FIBRILLATION) (HCC): ICD-10-CM

## 2025-02-28 DIAGNOSIS — E66.01 CLASS 2 SEVERE OBESITY WITH BODY MASS INDEX (BMI) OF 35 TO 39.9 WITH SERIOUS COMORBIDITY: ICD-10-CM

## 2025-02-28 DIAGNOSIS — Z79.899 MEDICATION MANAGEMENT: ICD-10-CM

## 2025-02-28 DIAGNOSIS — E78.2 MIXED HYPERLIPIDEMIA: Primary | ICD-10-CM

## 2025-02-28 DIAGNOSIS — I45.10 RBBB: ICD-10-CM

## 2025-02-28 DIAGNOSIS — R26.81 UNSTEADY GAIT WHEN WALKING: ICD-10-CM

## 2025-02-28 DIAGNOSIS — E66.812 CLASS 2 SEVERE OBESITY WITH BODY MASS INDEX (BMI) OF 35 TO 39.9 WITH SERIOUS COMORBIDITY: ICD-10-CM

## 2025-02-28 DIAGNOSIS — E11.43 CONTROLLED TYPE 2 DIABETES MELLITUS WITH DIABETIC AUTONOMIC NEUROPATHY, WITHOUT LONG-TERM CURRENT USE OF INSULIN (HCC): ICD-10-CM

## 2025-02-28 DIAGNOSIS — Z87.891 HISTORY OF TOBACCO ABUSE: ICD-10-CM

## 2025-02-28 DIAGNOSIS — I10 ESSENTIAL HYPERTENSION: ICD-10-CM

## 2025-02-28 PROCEDURE — 3078F DIAST BP <80 MM HG: CPT | Performed by: STUDENT IN AN ORGANIZED HEALTH CARE EDUCATION/TRAINING PROGRAM

## 2025-02-28 PROCEDURE — G8417 CALC BMI ABV UP PARAM F/U: HCPCS | Performed by: STUDENT IN AN ORGANIZED HEALTH CARE EDUCATION/TRAINING PROGRAM

## 2025-02-28 PROCEDURE — G8427 DOCREV CUR MEDS BY ELIG CLIN: HCPCS | Performed by: STUDENT IN AN ORGANIZED HEALTH CARE EDUCATION/TRAINING PROGRAM

## 2025-02-28 PROCEDURE — 3046F HEMOGLOBIN A1C LEVEL >9.0%: CPT | Performed by: STUDENT IN AN ORGANIZED HEALTH CARE EDUCATION/TRAINING PROGRAM

## 2025-02-28 PROCEDURE — 3074F SYST BP LT 130 MM HG: CPT | Performed by: STUDENT IN AN ORGANIZED HEALTH CARE EDUCATION/TRAINING PROGRAM

## 2025-02-28 PROCEDURE — 99214 OFFICE O/P EST MOD 30 MIN: CPT | Performed by: STUDENT IN AN ORGANIZED HEALTH CARE EDUCATION/TRAINING PROGRAM

## 2025-02-28 PROCEDURE — 2022F DILAT RTA XM EVC RTNOPTHY: CPT | Performed by: STUDENT IN AN ORGANIZED HEALTH CARE EDUCATION/TRAINING PROGRAM

## 2025-02-28 PROCEDURE — G2211 COMPLEX E/M VISIT ADD ON: HCPCS | Performed by: STUDENT IN AN ORGANIZED HEALTH CARE EDUCATION/TRAINING PROGRAM

## 2025-02-28 PROCEDURE — 1123F ACP DISCUSS/DSCN MKR DOCD: CPT | Performed by: STUDENT IN AN ORGANIZED HEALTH CARE EDUCATION/TRAINING PROGRAM

## 2025-02-28 PROCEDURE — 1036F TOBACCO NON-USER: CPT | Performed by: STUDENT IN AN ORGANIZED HEALTH CARE EDUCATION/TRAINING PROGRAM

## 2025-02-28 PROCEDURE — 3017F COLORECTAL CA SCREEN DOC REV: CPT | Performed by: STUDENT IN AN ORGANIZED HEALTH CARE EDUCATION/TRAINING PROGRAM

## 2025-02-28 PROCEDURE — 1159F MED LIST DOCD IN RCRD: CPT | Performed by: STUDENT IN AN ORGANIZED HEALTH CARE EDUCATION/TRAINING PROGRAM

## 2025-02-28 NOTE — PATIENT INSTRUCTIONS
Continue taking lisinopril 40mg daily, eliquis 5mg twice a day, metoprolol 25mg daily, zetia 10mg daily, fenofibrate 160mg daily.   Please obtain the following labs in one week; -LIPID FASTING, CMP

## 2025-03-03 ENCOUNTER — OFFICE VISIT (OUTPATIENT)
Dept: PULMONOLOGY | Age: 75
End: 2025-03-03
Payer: MEDICARE

## 2025-03-03 ENCOUNTER — TELEPHONE (OUTPATIENT)
Dept: PULMONOLOGY | Age: 75
End: 2025-03-03

## 2025-03-03 VITALS
OXYGEN SATURATION: 99 % | HEIGHT: 68 IN | SYSTOLIC BLOOD PRESSURE: 100 MMHG | HEART RATE: 72 BPM | DIASTOLIC BLOOD PRESSURE: 60 MMHG | BODY MASS INDEX: 33.74 KG/M2 | WEIGHT: 222.6 LBS

## 2025-03-03 DIAGNOSIS — E66.9 OBESITY (BMI 30-39.9): ICD-10-CM

## 2025-03-03 DIAGNOSIS — R06.83 SNORING: Primary | ICD-10-CM

## 2025-03-03 DIAGNOSIS — R53.83 OTHER FATIGUE: ICD-10-CM

## 2025-03-03 DIAGNOSIS — I10 ESSENTIAL HYPERTENSION: ICD-10-CM

## 2025-03-03 DIAGNOSIS — G47.30 OBSERVED SLEEP APNEA: ICD-10-CM

## 2025-03-03 PROCEDURE — 1160F RVW MEDS BY RX/DR IN RCRD: CPT | Performed by: NURSE PRACTITIONER

## 2025-03-03 PROCEDURE — 1036F TOBACCO NON-USER: CPT | Performed by: NURSE PRACTITIONER

## 2025-03-03 PROCEDURE — 1159F MED LIST DOCD IN RCRD: CPT | Performed by: NURSE PRACTITIONER

## 2025-03-03 PROCEDURE — 99214 OFFICE O/P EST MOD 30 MIN: CPT | Performed by: NURSE PRACTITIONER

## 2025-03-03 PROCEDURE — 1123F ACP DISCUSS/DSCN MKR DOCD: CPT | Performed by: NURSE PRACTITIONER

## 2025-03-03 PROCEDURE — 3074F SYST BP LT 130 MM HG: CPT | Performed by: NURSE PRACTITIONER

## 2025-03-03 PROCEDURE — 3078F DIAST BP <80 MM HG: CPT | Performed by: NURSE PRACTITIONER

## 2025-03-03 PROCEDURE — G8417 CALC BMI ABV UP PARAM F/U: HCPCS | Performed by: NURSE PRACTITIONER

## 2025-03-03 PROCEDURE — 3017F COLORECTAL CA SCREEN DOC REV: CPT | Performed by: NURSE PRACTITIONER

## 2025-03-03 PROCEDURE — G8427 DOCREV CUR MEDS BY ELIG CLIN: HCPCS | Performed by: NURSE PRACTITIONER

## 2025-03-03 ASSESSMENT — SLEEP AND FATIGUE QUESTIONNAIRES
HOW LIKELY ARE YOU TO NOD OFF OR FALL ASLEEP WHILE SITTING INACTIVE IN A PUBLIC PLACE: WOULD NEVER DOZE
HOW LIKELY ARE YOU TO NOD OFF OR FALL ASLEEP WHILE SITTING AND READING: WOULD NEVER DOZE
ESS TOTAL SCORE: 0
HOW LIKELY ARE YOU TO NOD OFF OR FALL ASLEEP WHILE LYING DOWN TO REST IN THE AFTERNOON WHEN CIRCUMSTANCES PERMIT: WOULD NEVER DOZE
HOW LIKELY ARE YOU TO NOD OFF OR FALL ASLEEP WHILE SITTING AND TALKING TO SOMEONE: WOULD NEVER DOZE
HOW LIKELY ARE YOU TO NOD OFF OR FALL ASLEEP WHILE WATCHING TV: WOULD NEVER DOZE
HOW LIKELY ARE YOU TO NOD OFF OR FALL ASLEEP WHILE SITTING QUIETLY AFTER LUNCH WITHOUT ALCOHOL: WOULD NEVER DOZE
HOW LIKELY ARE YOU TO NOD OFF OR FALL ASLEEP WHEN YOU ARE A PASSENGER IN A CAR FOR AN HOUR WITHOUT A BREAK: WOULD NEVER DOZE
HOW LIKELY ARE YOU TO NOD OFF OR FALL ASLEEP IN A CAR, WHILE STOPPED FOR A FEW MINUTES IN TRAFFIC: WOULD NEVER DOZE

## 2025-03-03 NOTE — PROGRESS NOTES
pharmacological therapy for RLS including avoiding aggravating factors (sleep deprivation, mental alerting activities at time of rest, antihistamines), moderate regular exercise, leg message and heating pads/hot shower.     Avoid sedatives, alcohol and caffeinated drinks at bed time.  No driving motorized vehicles or operating heavy machinery while fatigue, drowsy or sleepy.   Weight loss is also recommended as a long-term intervention.    Complications of KURT if not treated were discussed with patient patient to include systemic hypertension, pulmonary hypertension, cardiovascular morbidities, car accidents and all cause mortality.  Discussed pathophysiology of KURT with patient today  Patient education handout provided regarding sleep tips

## 2025-03-03 NOTE — PATIENT INSTRUCTIONS
card  We do not allow any pillows or bed linens from home due to health regulations  -We recommend that you not bring valuables with you to the Sleep Center, as we cannot be responsible for any lost or damaged personal items.  Please be aware that [facility] is a non-smoking facility. There is no smoking allowed anywhere on [facility] property at any time.  Each patient room is a private room with a private bathroom.  The in-lab test itself consist of electrodes and sensors attached to specific areas of your scalp, face, chest and legs. We will also monitor respiratory effort, nasal and oral airflow and oxygen levels. The test will not hurt- it is painless and not invasive in any way.      At home testing when you come to  the rental unit, please bring:   -I.D. and Insurance card  **Please note the Home Sleep Test Units are limited. It is a 1 night rental and must be dropped off the following day to ensure you are not billed a late or replacement fee**    Please refrain from or reduce the use of caffeine and/or alcohol prior to your sleep study and avoid napping the day of your study, as these will affect the accuracy of your test results.  If you are ill the day of your test (COVID-19, cold, upper respiratory infection, flu, etc.) please call to reschedule your test as the test will not be accurate, and for other patient safety.  If you should have any questions or need to cancel or reschedule your appointment, please call the [place] location. (Even if your test has been scheduled at our [place] location)   [phone]      Thank you, [facility]

## 2025-03-03 NOTE — TELEPHONE ENCOUNTER
Routed repeat PSG order to sleep lab. Pt going out of town soon. Changed 31-90 to a follow up appt.

## 2025-03-05 ENCOUNTER — HOSPITAL ENCOUNTER (OUTPATIENT)
Dept: SLEEP CENTER | Age: 75
Discharge: HOME | End: 2025-03-07
Payer: MEDICARE

## 2025-03-05 DIAGNOSIS — G47.30 OBSERVED SLEEP APNEA: ICD-10-CM

## 2025-03-05 DIAGNOSIS — R53.83 OTHER FATIGUE: ICD-10-CM

## 2025-03-05 DIAGNOSIS — I10 ESSENTIAL HYPERTENSION: ICD-10-CM

## 2025-03-05 DIAGNOSIS — R06.83 SNORING: ICD-10-CM

## 2025-03-05 DIAGNOSIS — E66.9 OBESITY (BMI 30-39.9): ICD-10-CM

## 2025-03-05 PROCEDURE — 95806 SLEEP STUDY UNATT&RESP EFFT: CPT

## 2025-03-06 PROBLEM — R53.83 OTHER FATIGUE: Status: ACTIVE | Noted: 2025-03-06

## 2025-03-06 PROBLEM — G47.30 OBSERVED SLEEP APNEA: Status: ACTIVE | Noted: 2025-03-06

## 2025-03-10 ENCOUNTER — RESULTS FOLLOW-UP (OUTPATIENT)
Dept: SLEEP CENTER | Age: 75
End: 2025-03-10

## 2025-03-10 SDOH — HEALTH STABILITY: PHYSICAL HEALTH: ON AVERAGE, HOW MANY DAYS PER WEEK DO YOU ENGAGE IN MODERATE TO STRENUOUS EXERCISE (LIKE A BRISK WALK)?: 3 DAYS

## 2025-03-10 SDOH — HEALTH STABILITY: PHYSICAL HEALTH: ON AVERAGE, HOW MANY MINUTES DO YOU ENGAGE IN EXERCISE AT THIS LEVEL?: 40 MIN

## 2025-03-10 ASSESSMENT — LIFESTYLE VARIABLES
HOW OFTEN DO YOU HAVE A DRINK CONTAINING ALCOHOL: 1
HOW OFTEN DO YOU HAVE A DRINK CONTAINING ALCOHOL: NEVER
HOW MANY STANDARD DRINKS CONTAINING ALCOHOL DO YOU HAVE ON A TYPICAL DAY: PATIENT DOES NOT DRINK
HOW MANY STANDARD DRINKS CONTAINING ALCOHOL DO YOU HAVE ON A TYPICAL DAY: 0
HOW OFTEN DO YOU HAVE SIX OR MORE DRINKS ON ONE OCCASION: 1

## 2025-03-10 ASSESSMENT — PATIENT HEALTH QUESTIONNAIRE - PHQ9
2. FEELING DOWN, DEPRESSED OR HOPELESS: NOT AT ALL
SUM OF ALL RESPONSES TO PHQ QUESTIONS 1-9: 0
1. LITTLE INTEREST OR PLEASURE IN DOING THINGS: NOT AT ALL

## 2025-03-13 ENCOUNTER — OFFICE VISIT (OUTPATIENT)
Dept: INTERNAL MEDICINE CLINIC | Age: 75
End: 2025-03-13

## 2025-03-13 VITALS
RESPIRATION RATE: 18 BRPM | WEIGHT: 217 LBS | DIASTOLIC BLOOD PRESSURE: 75 MMHG | SYSTOLIC BLOOD PRESSURE: 110 MMHG | HEIGHT: 68 IN | HEART RATE: 65 BPM | BODY MASS INDEX: 32.89 KG/M2

## 2025-03-13 DIAGNOSIS — N18.31 CHRONIC KIDNEY DISEASE, STAGE 3A (HCC): ICD-10-CM

## 2025-03-13 DIAGNOSIS — E11.43 CONTROLLED TYPE 2 DIABETES MELLITUS WITH DIABETIC AUTONOMIC NEUROPATHY, WITHOUT LONG-TERM CURRENT USE OF INSULIN (HCC): ICD-10-CM

## 2025-03-13 DIAGNOSIS — D68.69 SECONDARY HYPERCOAGULABLE STATE: ICD-10-CM

## 2025-03-13 DIAGNOSIS — I10 BENIGN ESSENTIAL HTN: ICD-10-CM

## 2025-03-13 DIAGNOSIS — E78.2 MIXED HYPERLIPIDEMIA: ICD-10-CM

## 2025-03-13 DIAGNOSIS — E66.812 CLASS 2 SEVERE OBESITY WITH BODY MASS INDEX (BMI) OF 35 TO 39.9 WITH SERIOUS COMORBIDITY: ICD-10-CM

## 2025-03-13 DIAGNOSIS — R39.14 BENIGN PROSTATIC HYPERPLASIA WITH INCOMPLETE BLADDER EMPTYING: ICD-10-CM

## 2025-03-13 DIAGNOSIS — N40.1 BENIGN PROSTATIC HYPERPLASIA WITH INCOMPLETE BLADDER EMPTYING: ICD-10-CM

## 2025-03-13 DIAGNOSIS — Z00.00 MEDICARE ANNUAL WELLNESS VISIT, SUBSEQUENT: Primary | ICD-10-CM

## 2025-03-13 DIAGNOSIS — E66.01 CLASS 2 SEVERE OBESITY WITH BODY MASS INDEX (BMI) OF 35 TO 39.9 WITH SERIOUS COMORBIDITY: ICD-10-CM

## 2025-03-13 DIAGNOSIS — E29.1 HYPOGONADISM MALE: ICD-10-CM

## 2025-03-13 DIAGNOSIS — I48.0 PAROXYSMAL ATRIAL FIBRILLATION (HCC): ICD-10-CM

## 2025-03-13 PROCEDURE — 3017F COLORECTAL CA SCREEN DOC REV: CPT | Performed by: INTERNAL MEDICINE

## 2025-03-13 PROCEDURE — 1159F MED LIST DOCD IN RCRD: CPT | Performed by: INTERNAL MEDICINE

## 2025-03-13 PROCEDURE — 1123F ACP DISCUSS/DSCN MKR DOCD: CPT | Performed by: INTERNAL MEDICINE

## 2025-03-13 PROCEDURE — 3074F SYST BP LT 130 MM HG: CPT | Performed by: INTERNAL MEDICINE

## 2025-03-13 PROCEDURE — 3078F DIAST BP <80 MM HG: CPT | Performed by: INTERNAL MEDICINE

## 2025-03-13 PROCEDURE — 3046F HEMOGLOBIN A1C LEVEL >9.0%: CPT | Performed by: INTERNAL MEDICINE

## 2025-03-13 PROCEDURE — G0439 PPPS, SUBSEQ VISIT: HCPCS | Performed by: INTERNAL MEDICINE

## 2025-03-13 PROCEDURE — 1160F RVW MEDS BY RX/DR IN RCRD: CPT | Performed by: INTERNAL MEDICINE

## 2025-03-13 RX ORDER — BUDESONIDE AND FORMOTEROL FUMARATE DIHYDRATE 160; 4.5 UG/1; UG/1
2 AEROSOL RESPIRATORY (INHALATION) 2 TIMES DAILY
COMMUNITY
Start: 2024-09-06 | End: 2025-03-13 | Stop reason: ALTCHOICE

## 2025-03-13 ASSESSMENT — PATIENT HEALTH QUESTIONNAIRE - PHQ9
2. FEELING DOWN, DEPRESSED OR HOPELESS: NOT AT ALL
1. LITTLE INTEREST OR PLEASURE IN DOING THINGS: NOT AT ALL
SUM OF ALL RESPONSES TO PHQ QUESTIONS 1-9: 0
SUM OF ALL RESPONSES TO PHQ QUESTIONS 1-9: 0
1. LITTLE INTEREST OR PLEASURE IN DOING THINGS: NOT AT ALL
SUM OF ALL RESPONSES TO PHQ QUESTIONS 1-9: 0
2. FEELING DOWN, DEPRESSED OR HOPELESS: NOT AT ALL

## 2025-03-14 ENCOUNTER — RESULTS FOLLOW-UP (OUTPATIENT)
Dept: INTERNAL MEDICINE CLINIC | Age: 75
End: 2025-03-14

## 2025-03-24 RX ORDER — FLUTICASONE FUROATE AND VILANTEROL TRIFENATATE 100; 25 UG/1; UG/1
1 POWDER RESPIRATORY (INHALATION) DAILY
Qty: 60 EACH | Refills: 2 | Status: SHIPPED | OUTPATIENT
Start: 2025-03-24

## 2025-05-05 ENCOUNTER — TELEPHONE (OUTPATIENT)
Dept: PULMONOLOGY | Age: 75
End: 2025-05-05

## 2025-05-05 ENCOUNTER — OFFICE VISIT (OUTPATIENT)
Dept: PULMONOLOGY | Age: 75
End: 2025-05-05
Payer: MEDICARE

## 2025-05-05 ENCOUNTER — TELEPHONE (OUTPATIENT)
Dept: INTERNAL MEDICINE CLINIC | Age: 75
End: 2025-05-05

## 2025-05-05 VITALS
HEART RATE: 89 BPM | OXYGEN SATURATION: 99 % | WEIGHT: 211.6 LBS | SYSTOLIC BLOOD PRESSURE: 114 MMHG | HEIGHT: 68 IN | BODY MASS INDEX: 32.07 KG/M2 | DIASTOLIC BLOOD PRESSURE: 62 MMHG

## 2025-05-05 DIAGNOSIS — E66.9 OBESITY (BMI 30-39.9): ICD-10-CM

## 2025-05-05 DIAGNOSIS — G47.33 MILD OBSTRUCTIVE SLEEP APNEA: Primary | ICD-10-CM

## 2025-05-05 DIAGNOSIS — T30.0 SEVERE BURN: Primary | ICD-10-CM

## 2025-05-05 DIAGNOSIS — I10 ESSENTIAL HYPERTENSION: ICD-10-CM

## 2025-05-05 DIAGNOSIS — R06.83 SNORING: ICD-10-CM

## 2025-05-05 PROCEDURE — 3078F DIAST BP <80 MM HG: CPT | Performed by: NURSE PRACTITIONER

## 2025-05-05 PROCEDURE — 1036F TOBACCO NON-USER: CPT | Performed by: NURSE PRACTITIONER

## 2025-05-05 PROCEDURE — 1160F RVW MEDS BY RX/DR IN RCRD: CPT | Performed by: NURSE PRACTITIONER

## 2025-05-05 PROCEDURE — 1123F ACP DISCUSS/DSCN MKR DOCD: CPT | Performed by: NURSE PRACTITIONER

## 2025-05-05 PROCEDURE — G8427 DOCREV CUR MEDS BY ELIG CLIN: HCPCS | Performed by: NURSE PRACTITIONER

## 2025-05-05 PROCEDURE — 3074F SYST BP LT 130 MM HG: CPT | Performed by: NURSE PRACTITIONER

## 2025-05-05 PROCEDURE — 1159F MED LIST DOCD IN RCRD: CPT | Performed by: NURSE PRACTITIONER

## 2025-05-05 PROCEDURE — 3017F COLORECTAL CA SCREEN DOC REV: CPT | Performed by: NURSE PRACTITIONER

## 2025-05-05 PROCEDURE — G8417 CALC BMI ABV UP PARAM F/U: HCPCS | Performed by: NURSE PRACTITIONER

## 2025-05-05 PROCEDURE — 99214 OFFICE O/P EST MOD 30 MIN: CPT | Performed by: NURSE PRACTITIONER

## 2025-05-05 RX ORDER — METFORMIN HYDROCHLORIDE 500 MG/1
1000 TABLET, EXTENDED RELEASE ORAL 2 TIMES DAILY
Qty: 180 TABLET | Refills: 0 | Status: SHIPPED | OUTPATIENT
Start: 2025-05-05

## 2025-05-05 ASSESSMENT — SLEEP AND FATIGUE QUESTIONNAIRES
HOW LIKELY ARE YOU TO NOD OFF OR FALL ASLEEP WHILE WATCHING TV: WOULD NEVER DOZE
HOW LIKELY ARE YOU TO NOD OFF OR FALL ASLEEP IN A CAR, WHILE STOPPED FOR A FEW MINUTES IN TRAFFIC: WOULD NEVER DOZE
HOW LIKELY ARE YOU TO NOD OFF OR FALL ASLEEP WHEN YOU ARE A PASSENGER IN A CAR FOR AN HOUR WITHOUT A BREAK: WOULD NEVER DOZE
HOW LIKELY ARE YOU TO NOD OFF OR FALL ASLEEP WHILE SITTING QUIETLY AFTER LUNCH WITHOUT ALCOHOL: WOULD NEVER DOZE
HOW LIKELY ARE YOU TO NOD OFF OR FALL ASLEEP WHILE SITTING INACTIVE IN A PUBLIC PLACE: WOULD NEVER DOZE
ESS TOTAL SCORE: 0
HOW LIKELY ARE YOU TO NOD OFF OR FALL ASLEEP WHILE LYING DOWN TO REST IN THE AFTERNOON WHEN CIRCUMSTANCES PERMIT: WOULD NEVER DOZE
HOW LIKELY ARE YOU TO NOD OFF OR FALL ASLEEP WHILE SITTING AND TALKING TO SOMEONE: WOULD NEVER DOZE
HOW LIKELY ARE YOU TO NOD OFF OR FALL ASLEEP WHILE SITTING AND READING: WOULD NEVER DOZE

## 2025-05-05 NOTE — TELEPHONE ENCOUNTER
Per Dr. Farris patient can see Dr. Stephens tomorrow afternoon.  Patient given contact information for Dr. Stephens.

## 2025-05-05 NOTE — PROGRESS NOTES
Patient ID: Greg Parekh is a 75 y.o. male who is being seen today for   Chief Complaint   Patient presents with    Follow-up     Referring: Dr. Donaldo Reid    HPI:     Greg Parekh is a 75 y.o. male in office with wife for KURT follow up. PSG was reviewed by me and noted below.  PSG showed mild KURT.  Results were dicussed with patient and multiple good questions were answered     PLMS on PSG states has some leg movements at night but does not tend to bother him, he does take gabapentin.    No significant daytime sleepiness. No nodding off when driving. No fatigue. Bedtime is MN and rise time is 6-8 am. Sleep onset is 10-15 minutes. Wakes up 1 times at night total. 1 nocturia. It takes few minutes to fall back a sleep. Rare naps during the day. No headache in am. No weight gain. 1 caffienated beverages during the day. No alcohol. ESS is 0        Previous HPI 3/3/25  Greg Parekh is a 75 y.o. male in office for KURT follow up.  PSG was reviewed by me and noted below.  It showed new significant sleep-related breathing disorder.  Results were dicussed with patient and multiple good questions were answered     PLMS on PSG.  States he has leg movements at night but it does not bother him.  He does take gabapentin.      States [facility] pul[facility].      +daytime sleepiness. No nodding off when driving. +fatigue. Bedtime is MN and rise time is 730-8 am. Sleep onset is few minutes. Wakes up 1-3 times at night total. 1-3 nocturia. It takes usually few minutes to fall back a sleep, but might just get up. 1 naps during the day 30 min. No headache in am. No weight gain. 1 caffienated beverages during the day. No alcohol. ESS is 3        Initial HPI 1/17/25  Greg Parekh is a 75 y.o. male in office for snoring evaluation. Patient reports snoring at night for the past 2-3 years. Does not sleep in supine position.  Has witnessed apnea. Wakes up at night choking or gasping for air. Sometimes restorative sleep.
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
## 2025-05-06 ENCOUNTER — HOSPITAL ENCOUNTER (OUTPATIENT)
Dept: WOUND CARE | Age: 75
Discharge: HOME | End: 2025-05-06
Payer: MEDICARE

## 2025-05-06 VITALS
TEMPERATURE: 98.7 F | DIASTOLIC BLOOD PRESSURE: 72 MMHG | RESPIRATION RATE: 18 BRPM | SYSTOLIC BLOOD PRESSURE: 117 MMHG | HEART RATE: 80 BPM | BODY MASS INDEX: 31.4 KG/M2 | HEIGHT: 68 IN | WEIGHT: 207.2 LBS

## 2025-05-06 PROBLEM — T30.4 CHEMICAL BURN: Status: ACTIVE | Noted: 2025-05-06

## 2025-05-06 PROCEDURE — 99203 OFFICE O/P NEW LOW 30 MIN: CPT | Performed by: STUDENT IN AN ORGANIZED HEALTH CARE EDUCATION/TRAINING PROGRAM

## 2025-05-06 PROCEDURE — 99202 OFFICE O/P NEW SF 15 MIN: CPT

## 2025-05-06 RX ORDER — DORZOLAMIDE HCL 20 MG/ML
1 SOLUTION/ DROPS OPHTHALMIC 2 TIMES DAILY
COMMUNITY

## 2025-05-06 RX ORDER — FAMOTIDINE 20 MG/1
20 TABLET, FILM COATED ORAL 2 TIMES DAILY
COMMUNITY
Start: 2025-03-05

## 2025-05-06 NOTE — PROGRESS NOTES
[facility] Progress Note    Greg Parekh     : 1950    DATE OF VISIT:  2025    Subjective:     Greg Parekh is a 75 y.o. male who has (a){[facility] wound type:97991} ulcer(s) located on the {JT wound location:06625}.     Additional ulcer(s) noted? {Responses; yes/no/wildcard:689571:o}. ***    His current medication list consists of Compression Stockings, Tirzepatide, allopurinol, apixaban, dorzolamide, ezetimibe, famotidine, fenofibrate, fluticasone, fluticasone furoate-vilanterol, gabapentin, lisinopril, metFORMIN, and metoprolol succinate.    Allergies: Augmentin [amoxicillin-pot clavulanate], Bactrim [sulfamethoxazole-trimethoprim], Simvastatin, Statins, and Sulfa antibiotics    Objective:     Vitals:    25 1421   BP: 117/72   Pulse: 80   Resp: 18   Temp: 98.7 °F (37.1 °C)   TempSrc: Oral   Weight: 94 kg (207 lb 3.2 oz)   Height: 1.727 m (5' 8\")       Physical  Constitutional:  NAD  Respiratory:  Normal effort  Psychiatric:  Mood and affect appropriate  Lymphatic:  *** edema, *** cellulitis  Neurologic: *** focal deficit   Musculoskeletal:  ***    Kristin-ulcer skin: {[facility] periwound skin exam:95993}.  Ulcer(s): {[facility] wound exam:27557}. Photos also saved in electronic chart.    Today's wound measurements, per RN documentation:                         Assessment:     Patient Active Problem List   Diagnosis Code    Essential hypertension I10    Controlled type 2 diabetes mellitus with diabetic autonomic neuropathy, without long-term current use of insulin ([facility]) E11.43    ED (erectile dysfunction) N52.9    Hypogonadism male E29.1    Lipodermatosclerosis M79.3    Mixed hyperlipidemia E78.2    Class 2 severe obesity with body mass index (BMI) of 35 to 39.9 with serious comorbidity (HCC) E66.812, E66.01    Chronic gout of left foot M1A.0720    History of tobacco abuse Z87.891    Peroneal tendonitis, right M76.71    Left Achilles tendinitis M76.62    Secondary hypercoagulable
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
## 2025-05-23 DIAGNOSIS — E11.43 CONTROLLED TYPE 2 DIABETES MELLITUS WITH DIABETIC AUTONOMIC NEUROPATHY, WITHOUT LONG-TERM CURRENT USE OF INSULIN (HCC): ICD-10-CM

## 2025-05-23 DIAGNOSIS — E66.01 CLASS 2 SEVERE OBESITY WITH BODY MASS INDEX (BMI) OF 35 TO 39.9 WITH SERIOUS COMORBIDITY (HCC): ICD-10-CM

## 2025-05-23 DIAGNOSIS — E66.812 CLASS 2 SEVERE OBESITY WITH BODY MASS INDEX (BMI) OF 35 TO 39.9 WITH SERIOUS COMORBIDITY (HCC): ICD-10-CM

## 2025-05-27 RX ORDER — TIRZEPATIDE 5 MG/.5ML
INJECTION, SOLUTION SUBCUTANEOUS
Qty: 2 ML | Refills: 0 | Status: SHIPPED | OUTPATIENT
Start: 2025-05-27

## 2025-06-20 DIAGNOSIS — E11.40 NEUROPATHY DUE TO TYPE 2 DIABETES MELLITUS (HCC): ICD-10-CM

## 2025-06-20 DIAGNOSIS — Z79.899 CONTROLLED SUBSTANCE AGREEMENT SIGNED: ICD-10-CM

## 2025-06-23 RX ORDER — GABAPENTIN 600 MG/1
600 TABLET ORAL 3 TIMES DAILY
Qty: 270 TABLET | Refills: 1 | Status: SHIPPED | OUTPATIENT
Start: 2025-06-23 | End: 2025-12-20

## 2025-06-25 ENCOUNTER — TELEPHONE (OUTPATIENT)
Dept: INTERNAL MEDICINE CLINIC | Age: 75
End: 2025-06-25

## 2025-06-25 DIAGNOSIS — R05.9 COUGH, UNSPECIFIED TYPE: Primary | ICD-10-CM

## 2025-06-25 RX ORDER — DOXYCYCLINE HYCLATE 100 MG
100 TABLET ORAL 2 TIMES DAILY
Qty: 10 TABLET | Refills: 0 | Status: SHIPPED | OUTPATIENT
Start: 2025-06-25 | End: 2025-06-30

## 2025-06-25 NOTE — TELEPHONE ENCOUNTER
----- Message from Dr. Andrei Asher MD sent at 6/25/2025  2:40 PM EDT -----  Contact: [phone]  Doxy 100 mg bid x 5 days  Cxr if not better  ----- Message -----  From: Lyudmila Weiss  Sent: 6/25/2025   2:12 PM EDT  To: Andrei Asher MD    Pt states he and his spouse went on a cruise and ever since they have been back they have not been feeling well with symptoms of cough, chest congestion with yellow/green sputum and low grade fever. Pt has taken Musinex otc that has not helped. Pt has tested negative for Covid. Please advise     [facility]

## 2025-07-13 DIAGNOSIS — E66.01 CLASS 2 SEVERE OBESITY WITH BODY MASS INDEX (BMI) OF 35 TO 39.9 WITH SERIOUS COMORBIDITY (HCC): ICD-10-CM

## 2025-07-13 DIAGNOSIS — E66.812 CLASS 2 SEVERE OBESITY WITH BODY MASS INDEX (BMI) OF 35 TO 39.9 WITH SERIOUS COMORBIDITY (HCC): ICD-10-CM

## 2025-07-13 DIAGNOSIS — E11.43 CONTROLLED TYPE 2 DIABETES MELLITUS WITH DIABETIC AUTONOMIC NEUROPATHY, WITHOUT LONG-TERM CURRENT USE OF INSULIN (HCC): ICD-10-CM

## 2025-07-14 RX ORDER — TIRZEPATIDE 5 MG/.5ML
5 INJECTION, SOLUTION SUBCUTANEOUS
Qty: 2 ML | Refills: 0 | Status: SHIPPED | OUTPATIENT
Start: 2025-07-14

## 2025-07-16 ENCOUNTER — OFFICE VISIT (OUTPATIENT)
Dept: CARDIOLOGY CLINIC | Age: 75
End: 2025-07-16
Payer: MEDICARE

## 2025-07-16 VITALS
BODY MASS INDEX: 32.08 KG/M2 | HEIGHT: 68 IN | DIASTOLIC BLOOD PRESSURE: 62 MMHG | WEIGHT: 211.64 LBS | HEART RATE: 63 BPM | OXYGEN SATURATION: 100 % | SYSTOLIC BLOOD PRESSURE: 138 MMHG

## 2025-07-16 DIAGNOSIS — I48.0 PAF (PAROXYSMAL ATRIAL FIBRILLATION) (HCC): Primary | ICD-10-CM

## 2025-07-16 DIAGNOSIS — I48.0 PAROXYSMAL ATRIAL FIBRILLATION (HCC): ICD-10-CM

## 2025-07-16 DIAGNOSIS — G47.33 OSA (OBSTRUCTIVE SLEEP APNEA): ICD-10-CM

## 2025-07-16 DIAGNOSIS — Z79.899 ENCOUNTER FOR MONITORING BETA BLOCKER THERAPY: ICD-10-CM

## 2025-07-16 DIAGNOSIS — I10 PRIMARY HYPERTENSION: ICD-10-CM

## 2025-07-16 DIAGNOSIS — E66.811 OBESITY (BMI 30.0-34.9): ICD-10-CM

## 2025-07-16 DIAGNOSIS — I45.10 RBBB: ICD-10-CM

## 2025-07-16 DIAGNOSIS — Z51.81 ENCOUNTER FOR MONITORING BETA BLOCKER THERAPY: ICD-10-CM

## 2025-07-16 LAB
EKG DIAGNOSIS: NORMAL
EKG Q-T INTERVAL: 360 MS
EKG QRS DURATION: 120 MS
EKG QTC CALCULATION (BAZETT): 368 MS
EKG R AXIS: 58 DEGREES
EKG T AXIS: 57 DEGREES
EKG VENTRICULAR RATE: 63 BPM

## 2025-07-16 PROCEDURE — 3017F COLORECTAL CA SCREEN DOC REV: CPT | Performed by: INTERNAL MEDICINE

## 2025-07-16 PROCEDURE — 1159F MED LIST DOCD IN RCRD: CPT | Performed by: INTERNAL MEDICINE

## 2025-07-16 PROCEDURE — 3075F SYST BP GE 130 - 139MM HG: CPT | Performed by: INTERNAL MEDICINE

## 2025-07-16 PROCEDURE — 99214 OFFICE O/P EST MOD 30 MIN: CPT | Performed by: INTERNAL MEDICINE

## 2025-07-16 PROCEDURE — 1036F TOBACCO NON-USER: CPT | Performed by: INTERNAL MEDICINE

## 2025-07-16 PROCEDURE — G8417 CALC BMI ABV UP PARAM F/U: HCPCS | Performed by: INTERNAL MEDICINE

## 2025-07-16 PROCEDURE — G8427 DOCREV CUR MEDS BY ELIG CLIN: HCPCS | Performed by: INTERNAL MEDICINE

## 2025-07-16 PROCEDURE — G2211 COMPLEX E/M VISIT ADD ON: HCPCS | Performed by: INTERNAL MEDICINE

## 2025-07-16 PROCEDURE — 3078F DIAST BP <80 MM HG: CPT | Performed by: INTERNAL MEDICINE

## 2025-07-16 PROCEDURE — 1124F ACP DISCUSS-NO DSCNMKR DOCD: CPT | Performed by: INTERNAL MEDICINE

## 2025-07-16 RX ORDER — CARVEDILOL 6.25 MG/1
6.25 TABLET ORAL 2 TIMES DAILY
Qty: 180 TABLET | Refills: 1 | Status: SHIPPED | OUTPATIENT
Start: 2025-07-16

## 2025-07-16 ASSESSMENT — ENCOUNTER SYMPTOMS
SNORING: 1
RIGHT EYE: 0
WHEEZING: 0
STRIDOR: 0
HEMATEMESIS: 0
HEMATOCHEZIA: 0
SHORTNESS OF BREATH: 1
LEFT EYE: 0

## 2025-07-16 NOTE — PATIENT INSTRUCTIONS
Plan:     Stop Metoprolol.   Start Carvedilol 6.25 mg twice daily.   Cardiac event monitor for two weeks to be worn in two weeks.   Monitor your heart rate and blood pressure at home.   Monitor for recurrence of AF with Apple Watch.   Continue taking Eliquis as prescribed.   Follow up with me in 6 months.

## 2025-07-16 NOTE — PROGRESS NOTES
file (1/18/2025)   Transportation Needs: Unknown (3/6/2024)    PRAPARE - Transportation     Lack of Transportation (Medical): Not on file     Lack of Transportation (Non-Medical): No   Physical Activity: Insufficiently Active (3/10/2025)    Exercise Vital Sign     Days of Exercise per Week: 3 days     Minutes of Exercise per Session: 40 min   Stress: Not on file   Social Connections: Not on file   Intimate Partner Violence: Not At Risk (10/2/2023)    Humiliation, Afraid, Rape, and Kick questionnaire     Fear of Current or Ex-Partner: No     Emotionally Abused: No     Physically Abused: No     Sexually Abused: No   Housing Stability: Unknown (1/18/2025)    Housing Stability Vital Sign     Unable to Pay for Housing in the Last Year: Not on file     Number of Times Moved in the Last Year: 0     Homeless in the Last Year: No         Family History   Problem Relation Age of Onset    Cancer Mother         colon    Colon Cancer Mother     Cancer Father         Lung    Cancer Sister         pancreatic    Diabetes Sister     Cancer Sister         Pancreatic    Other Brother         diverticulitis with partial colectomy    Cancer Brother         Appendix    Heart Attack Brother         Heart Attack, stints    Heart Disease Brother     Cancer Sister         Kidney         Objective:     /62   Pulse 63   Ht 1.727 m (5' 7.99\")   Wt 96 kg (211 lb 10.3 oz)   SpO2 100%   BMI 32.19 kg/m²     Wt Readings from Last 3 Encounters:   07/16/25 96 kg (211 lb 10.3 oz)   05/06/25 94 kg (207 lb 3.2 oz)   05/05/25 96 kg (211 lb 9.6 oz)     Physical Exam  Constitutional:       Appearance: Normal appearance.   HENT:      Head: Normocephalic and atraumatic.      Nose: Nose normal. No rhinorrhea.   Eyes:      General: No scleral icterus.     Conjunctiva/sclera: Conjunctivae normal.   Cardiovascular:      Rate and Rhythm: Normal rate and regular rhythm.   Pulmonary:      Effort: Pulmonary effort is normal.      Breath sounds: Normal

## 2025-07-18 DIAGNOSIS — E78.1 HYPERTRIGLYCERIDEMIA: ICD-10-CM

## 2025-07-18 RX ORDER — FLUTICASONE FUROATE AND VILANTEROL TRIFENATATE 100; 25 UG/1; UG/1
1 POWDER RESPIRATORY (INHALATION) DAILY
Qty: 60 EACH | Refills: 2 | Status: SHIPPED | OUTPATIENT
Start: 2025-07-18

## 2025-07-18 RX ORDER — FENOFIBRATE 160 MG/1
160 TABLET ORAL DAILY
Qty: 90 TABLET | Refills: 3 | Status: SHIPPED | OUTPATIENT
Start: 2025-07-18

## 2025-07-18 NOTE — TELEPHONE ENCOUNTER
Last Office Visit: 07/16/25 Provider: AMP  **Is provider OOT? Yes    Next Office Visit: 8/29/2025 Provider: AMP      LAST LABS:   Lipid:  Lab Results   Component Value Date    CHOL 165 01/03/2025    TRIG 364 (H) 01/03/2025    HDL 32 (L) 01/03/2025    LDL see below 01/03/2025    VLDL see below 01/03/2025     Lab orders needed? no

## 2025-07-18 NOTE — TELEPHONE ENCOUNTER
Greg Parekh  1950  [phone]    Medication Refill    Medication needing refilled: Fenofibrate    Dosage of the medication: 160 mg     How are you taking this medication (QD, BID, TID, QID, PRN): 1 qd    30 or 90 day supply called in: 90    Which Pharmacy are we sending the medication to?: [facility]    Last OV: 2.28.25     Next OV: 08.29.25

## 2025-07-28 ENCOUNTER — TELEPHONE (OUTPATIENT)
Dept: PULMONOLOGY | Age: 75
End: 2025-07-28

## 2025-07-28 ENCOUNTER — TELEMEDICINE (OUTPATIENT)
Dept: PULMONOLOGY | Age: 75
End: 2025-07-28
Payer: MEDICARE

## 2025-07-28 DIAGNOSIS — E66.9 OBESITY (BMI 30-39.9): ICD-10-CM

## 2025-07-28 DIAGNOSIS — I10 ESSENTIAL HYPERTENSION: ICD-10-CM

## 2025-07-28 DIAGNOSIS — Z71.89 CPAP USE COUNSELING: ICD-10-CM

## 2025-07-28 DIAGNOSIS — G47.33 MILD OBSTRUCTIVE SLEEP APNEA: Primary | ICD-10-CM

## 2025-07-28 PROCEDURE — G8427 DOCREV CUR MEDS BY ELIG CLIN: HCPCS | Performed by: NURSE PRACTITIONER

## 2025-07-28 PROCEDURE — 99214 OFFICE O/P EST MOD 30 MIN: CPT | Performed by: NURSE PRACTITIONER

## 2025-07-28 PROCEDURE — G2211 COMPLEX E/M VISIT ADD ON: HCPCS | Performed by: NURSE PRACTITIONER

## 2025-07-28 PROCEDURE — 1124F ACP DISCUSS-NO DSCNMKR DOCD: CPT | Performed by: NURSE PRACTITIONER

## 2025-07-28 PROCEDURE — 1159F MED LIST DOCD IN RCRD: CPT | Performed by: NURSE PRACTITIONER

## 2025-07-28 PROCEDURE — 3017F COLORECTAL CA SCREEN DOC REV: CPT | Performed by: NURSE PRACTITIONER

## 2025-07-28 PROCEDURE — 1160F RVW MEDS BY RX/DR IN RCRD: CPT | Performed by: NURSE PRACTITIONER

## 2025-07-28 ASSESSMENT — SLEEP AND FATIGUE QUESTIONNAIRES
HOW LIKELY ARE YOU TO NOD OFF OR FALL ASLEEP WHILE SITTING AND READING: WOULD NEVER DOZE
HOW LIKELY ARE YOU TO NOD OFF OR FALL ASLEEP WHILE SITTING QUIETLY AFTER LUNCH WITHOUT ALCOHOL: WOULD NEVER DOZE
HOW LIKELY ARE YOU TO NOD OFF OR FALL ASLEEP WHILE WATCHING TV: WOULD NEVER DOZE
ESS TOTAL SCORE: 0
HOW LIKELY ARE YOU TO NOD OFF OR FALL ASLEEP WHILE LYING DOWN TO REST IN THE AFTERNOON WHEN CIRCUMSTANCES PERMIT: WOULD NEVER DOZE
HOW LIKELY ARE YOU TO NOD OFF OR FALL ASLEEP WHEN YOU ARE A PASSENGER IN A CAR FOR AN HOUR WITHOUT A BREAK: WOULD NEVER DOZE
HOW LIKELY ARE YOU TO NOD OFF OR FALL ASLEEP WHILE SITTING AND TALKING TO SOMEONE: WOULD NEVER DOZE
HOW LIKELY ARE YOU TO NOD OFF OR FALL ASLEEP IN A CAR, WHILE STOPPED FOR A FEW MINUTES IN TRAFFIC: WOULD NEVER DOZE
HOW LIKELY ARE YOU TO NOD OFF OR FALL ASLEEP WHILE SITTING INACTIVE IN A PUBLIC PLACE: WOULD NEVER DOZE

## 2025-07-28 NOTE — PROGRESS NOTES
Patient ID: Greg Parekh is a 75 y.o. male who is being seen today for   Chief Complaint   Patient presents with    Follow-up    Sleep Apnea     Referring: Dr. Donaldo Reid    HPI:   Greg Parekh is a 75 y.o. male for televideo appointment via video and audio virtual visit for KURT follow up.  He started auto CPAP after last visit.  States he is doing well with CPAP.    Patient is using CPAP  7 hrs/night. Using humidifier. No snoring on CPAP. The pressure is well tolerated. The mask is comfortable-nasal pillows. No mask leak. No significant daytime sleepiness. No nodding off when driving. No dry nose or throat. No fatigue. Bedtime is MN and rise time is 6-8 am. Sleep onset is few minutes. Wakes up 1 times at night total. No nocturia- better. It takes few minutes to fall back a sleep. No naps during the day. No headache in am. No weight gain. 1 caffienated beverages during the day. No alcohol. ESS is 0        Previous HPI 5/5/25  Greg Parekh is a 75 y.o. male in office with wife for KURT follow up. PSG was reviewed by me and noted below.  PSG showed mild KURT.  Results were dicussed with patient and multiple good questions were answered     PLMS on PSG states has some leg movements at night but does not tend to bother him, he does take gabapentin.    No significant daytime sleepiness. No nodding off when driving. No fatigue. Bedtime is MN and rise time is 6-8 am. Sleep onset is 10-15 minutes. Wakes up 1 times at night total. 1 nocturia. It takes few minutes to fall back a sleep. Rare naps during the day. No headache in am. No weight gain. 1 caffienated beverages during the day. No alcohol. ESS is 0        Previous HPI 3/3/25  Greg Parekh is a 75 y.o. male in office for KURT follow up.  PSG was reviewed by me and noted below.  It showed new significant sleep-related breathing disorder.  Results were dicussed with patient and multiple good questions were answered     PLMS on PSG.  States he has leg

## 2025-07-30 ENCOUNTER — TELEPHONE (OUTPATIENT)
Dept: CARDIOLOGY CLINIC | Age: 75
End: 2025-07-30

## 2025-07-30 ENCOUNTER — ANCILLARY PROCEDURE (OUTPATIENT)
Dept: CARDIOLOGY CLINIC | Age: 75
End: 2025-07-30

## 2025-07-30 DIAGNOSIS — I48.0 PAROXYSMAL ATRIAL FIBRILLATION (HCC): ICD-10-CM

## 2025-07-30 NOTE — TELEPHONE ENCOUNTER
Monitor placed by [facility]  Monitor company   Length of monitor 14 days  Monitor ordered by [employer]  Device ID [id number]  Patch ID #[id number] #[id number]  Activation successful prior to pt leaving office? YES

## 2025-08-21 ENCOUNTER — OFFICE VISIT (OUTPATIENT)
Dept: INTERNAL MEDICINE CLINIC | Age: 75
End: 2025-08-21

## 2025-08-21 VITALS
SYSTOLIC BLOOD PRESSURE: 135 MMHG | HEART RATE: 64 BPM | WEIGHT: 207 LBS | RESPIRATION RATE: 18 BRPM | HEIGHT: 68 IN | DIASTOLIC BLOOD PRESSURE: 75 MMHG | BODY MASS INDEX: 31.37 KG/M2

## 2025-08-21 DIAGNOSIS — E78.2 MIXED HYPERLIPIDEMIA: ICD-10-CM

## 2025-08-21 DIAGNOSIS — I10 BENIGN ESSENTIAL HTN: ICD-10-CM

## 2025-08-21 DIAGNOSIS — R39.14 BENIGN PROSTATIC HYPERPLASIA WITH INCOMPLETE BLADDER EMPTYING: ICD-10-CM

## 2025-08-21 DIAGNOSIS — N18.31 CHRONIC KIDNEY DISEASE, STAGE 3A (HCC): ICD-10-CM

## 2025-08-21 DIAGNOSIS — N40.1 BENIGN PROSTATIC HYPERPLASIA WITH INCOMPLETE BLADDER EMPTYING: ICD-10-CM

## 2025-08-21 DIAGNOSIS — E11.43 CONTROLLED TYPE 2 DIABETES MELLITUS WITH DIABETIC AUTONOMIC NEUROPATHY, WITHOUT LONG-TERM CURRENT USE OF INSULIN (HCC): ICD-10-CM

## 2025-08-21 DIAGNOSIS — I48.0 PAROXYSMAL ATRIAL FIBRILLATION (HCC): ICD-10-CM

## 2025-08-21 DIAGNOSIS — I10 ESSENTIAL HYPERTENSION: Primary | ICD-10-CM

## 2025-08-21 LAB
ALBUMIN SERPL-MCNC: 4.7 G/DL (ref 3.4–5)
ALBUMIN/GLOB SERPL: 2.1 {RATIO} (ref 1.1–2.2)
ALP SERPL-CCNC: 32 U/L (ref 40–129)
ALT SERPL-CCNC: 19 U/L (ref 10–40)
ANION GAP SERPL CALCULATED.3IONS-SCNC: 9 MMOL/L (ref 3–16)
AST SERPL-CCNC: 22 U/L (ref 15–37)
BILIRUB SERPL-MCNC: 0.3 MG/DL (ref 0–1)
BUN SERPL-MCNC: 26 MG/DL (ref 7–20)
CALCIUM SERPL-MCNC: 9.8 MG/DL (ref 8.3–10.6)
CHLORIDE SERPL-SCNC: 105 MMOL/L (ref 99–110)
CO2 SERPL-SCNC: 23 MMOL/L (ref 21–32)
CREAT SERPL-MCNC: 1.9 MG/DL (ref 0.8–1.3)
EST. AVERAGE GLUCOSE BLD GHB EST-MCNC: 128.4 MG/DL
GFR SERPLBLD CREATININE-BSD FMLA CKD-EPI: 36 ML/MIN/{1.73_M2}
GLUCOSE SERPL-MCNC: 101 MG/DL (ref 70–99)
HBA1C MFR BLD: 6.1 %
POTASSIUM SERPL-SCNC: 5.2 MMOL/L (ref 3.5–5.1)
PROT SERPL-MCNC: 6.9 G/DL (ref 6.4–8.2)
SODIUM SERPL-SCNC: 137 MMOL/L (ref 136–145)

## 2025-08-21 PROCEDURE — 1124F ACP DISCUSS-NO DSCNMKR DOCD: CPT | Performed by: INTERNAL MEDICINE

## 2025-08-21 PROCEDURE — 3046F HEMOGLOBIN A1C LEVEL >9.0%: CPT | Performed by: INTERNAL MEDICINE

## 2025-08-21 PROCEDURE — 1160F RVW MEDS BY RX/DR IN RCRD: CPT | Performed by: INTERNAL MEDICINE

## 2025-08-21 PROCEDURE — 1159F MED LIST DOCD IN RCRD: CPT | Performed by: INTERNAL MEDICINE

## 2025-08-21 PROCEDURE — G8417 CALC BMI ABV UP PARAM F/U: HCPCS | Performed by: INTERNAL MEDICINE

## 2025-08-21 PROCEDURE — 99213 OFFICE O/P EST LOW 20 MIN: CPT | Performed by: INTERNAL MEDICINE

## 2025-08-21 PROCEDURE — 3075F SYST BP GE 130 - 139MM HG: CPT | Performed by: INTERNAL MEDICINE

## 2025-08-21 PROCEDURE — 2022F DILAT RTA XM EVC RTNOPTHY: CPT | Performed by: INTERNAL MEDICINE

## 2025-08-21 PROCEDURE — G8427 DOCREV CUR MEDS BY ELIG CLIN: HCPCS | Performed by: INTERNAL MEDICINE

## 2025-08-21 PROCEDURE — 3017F COLORECTAL CA SCREEN DOC REV: CPT | Performed by: INTERNAL MEDICINE

## 2025-08-21 PROCEDURE — 3078F DIAST BP <80 MM HG: CPT | Performed by: INTERNAL MEDICINE

## 2025-08-21 PROCEDURE — 1036F TOBACCO NON-USER: CPT | Performed by: INTERNAL MEDICINE

## 2025-08-21 ASSESSMENT — ENCOUNTER SYMPTOMS: SHORTNESS OF BREATH: 0

## 2025-08-22 ENCOUNTER — RESULTS FOLLOW-UP (OUTPATIENT)
Dept: INTERNAL MEDICINE CLINIC | Age: 75
End: 2025-08-22

## 2025-08-22 DIAGNOSIS — N17.9 AKI (ACUTE KIDNEY INJURY): Primary | ICD-10-CM

## 2025-08-29 ENCOUNTER — HOSPITAL ENCOUNTER (OUTPATIENT)
Dept: LAB | Age: 75
Discharge: HOME | End: 2025-08-29
Payer: MEDICARE

## 2025-08-29 ENCOUNTER — OFFICE VISIT (OUTPATIENT)
Dept: CARDIOLOGY CLINIC | Age: 75
End: 2025-08-29
Payer: MEDICARE

## 2025-08-29 ENCOUNTER — TELEPHONE (OUTPATIENT)
Dept: CARDIOLOGY CLINIC | Age: 75
End: 2025-08-29

## 2025-08-29 VITALS
BODY MASS INDEX: 30.77 KG/M2 | SYSTOLIC BLOOD PRESSURE: 124 MMHG | HEART RATE: 77 BPM | HEIGHT: 68 IN | OXYGEN SATURATION: 99 % | DIASTOLIC BLOOD PRESSURE: 60 MMHG | WEIGHT: 203 LBS

## 2025-08-29 DIAGNOSIS — Z79.899 MEDICATION MANAGEMENT: ICD-10-CM

## 2025-08-29 DIAGNOSIS — Z87.891 HISTORY OF TOBACCO ABUSE: ICD-10-CM

## 2025-08-29 DIAGNOSIS — I10 ESSENTIAL HYPERTENSION: ICD-10-CM

## 2025-08-29 DIAGNOSIS — E78.2 MIXED HYPERLIPIDEMIA: ICD-10-CM

## 2025-08-29 DIAGNOSIS — I48.0 PAROXYSMAL ATRIAL FIBRILLATION (HCC): ICD-10-CM

## 2025-08-29 DIAGNOSIS — Z79.899 MEDICATION MANAGEMENT: Primary | ICD-10-CM

## 2025-08-29 DIAGNOSIS — N17.9 AKI (ACUTE KIDNEY INJURY): ICD-10-CM

## 2025-08-29 LAB
ALBUMIN SERPL-MCNC: 4.7 G/DL (ref 3.4–5)
ALP SERPL-CCNC: 30 U/L (ref 40–129)
ALT SERPL-CCNC: 16 U/L (ref 10–40)
ANION GAP SERPL CALCULATED.3IONS-SCNC: 12 MMOL/L (ref 3–16)
AST SERPL-CCNC: 20 U/L (ref 15–37)
BILIRUB DIRECT SERPL-MCNC: 0.2 MG/DL (ref 0–0.3)
BILIRUB INDIRECT SERPL-MCNC: 0.2 MG/DL (ref 0–1)
BILIRUB SERPL-MCNC: 0.4 MG/DL (ref 0–1)
BUN SERPL-MCNC: 35 MG/DL (ref 7–20)
CALCIUM SERPL-MCNC: 9.9 MG/DL (ref 8.3–10.6)
CHLORIDE SERPL-SCNC: 105 MMOL/L (ref 99–110)
CHOLEST SERPL-MCNC: 137 MG/DL (ref 0–199)
CO2 SERPL-SCNC: 22 MMOL/L (ref 21–32)
CREAT SERPL-MCNC: 2.2 MG/DL (ref 0.8–1.3)
GFR SERPLBLD CREATININE-BSD FMLA CKD-EPI: 30 ML/MIN/{1.73_M2}
GLUCOSE SERPL-MCNC: 110 MG/DL (ref 70–99)
HDLC SERPL-MCNC: 51 MG/DL (ref 40–60)
LDLC SERPL CALC-MCNC: 69 MG/DL
POTASSIUM SERPL-SCNC: 5 MMOL/L (ref 3.5–5.1)
PROT SERPL-MCNC: 7.1 G/DL (ref 6.4–8.2)
SODIUM SERPL-SCNC: 139 MMOL/L (ref 136–145)
TRIGL SERPL-MCNC: 83 MG/DL (ref 0–150)
VLDLC SERPL CALC-MCNC: 17 MG/DL

## 2025-08-29 PROCEDURE — 1036F TOBACCO NON-USER: CPT | Performed by: STUDENT IN AN ORGANIZED HEALTH CARE EDUCATION/TRAINING PROGRAM

## 2025-08-29 PROCEDURE — 36415 COLL VENOUS BLD VENIPUNCTURE: CPT

## 2025-08-29 PROCEDURE — 99214 OFFICE O/P EST MOD 30 MIN: CPT | Performed by: STUDENT IN AN ORGANIZED HEALTH CARE EDUCATION/TRAINING PROGRAM

## 2025-08-29 PROCEDURE — 80076 HEPATIC FUNCTION PANEL: CPT

## 2025-08-29 PROCEDURE — 80048 BASIC METABOLIC PNL TOTAL CA: CPT

## 2025-08-29 PROCEDURE — G8427 DOCREV CUR MEDS BY ELIG CLIN: HCPCS | Performed by: STUDENT IN AN ORGANIZED HEALTH CARE EDUCATION/TRAINING PROGRAM

## 2025-08-29 PROCEDURE — 1159F MED LIST DOCD IN RCRD: CPT | Performed by: STUDENT IN AN ORGANIZED HEALTH CARE EDUCATION/TRAINING PROGRAM

## 2025-08-29 PROCEDURE — 3074F SYST BP LT 130 MM HG: CPT | Performed by: STUDENT IN AN ORGANIZED HEALTH CARE EDUCATION/TRAINING PROGRAM

## 2025-08-29 PROCEDURE — 3078F DIAST BP <80 MM HG: CPT | Performed by: STUDENT IN AN ORGANIZED HEALTH CARE EDUCATION/TRAINING PROGRAM

## 2025-08-29 PROCEDURE — 1124F ACP DISCUSS-NO DSCNMKR DOCD: CPT | Performed by: STUDENT IN AN ORGANIZED HEALTH CARE EDUCATION/TRAINING PROGRAM

## 2025-08-29 PROCEDURE — G2211 COMPLEX E/M VISIT ADD ON: HCPCS | Performed by: STUDENT IN AN ORGANIZED HEALTH CARE EDUCATION/TRAINING PROGRAM

## 2025-08-29 PROCEDURE — G8417 CALC BMI ABV UP PARAM F/U: HCPCS | Performed by: STUDENT IN AN ORGANIZED HEALTH CARE EDUCATION/TRAINING PROGRAM

## 2025-08-29 PROCEDURE — 80061 LIPID PANEL: CPT

## 2025-08-29 PROCEDURE — 3017F COLORECTAL CA SCREEN DOC REV: CPT | Performed by: STUDENT IN AN ORGANIZED HEALTH CARE EDUCATION/TRAINING PROGRAM

## (undated) DEVICE — GLOVE SURG SZ 65 L12IN FNGR THK94MIL STD WHT LTX FREE

## (undated) DEVICE — STERILE POLYISOPRENE POWDER-FREE SURGICAL GLOVES: Brand: PROTEXIS

## (undated) DEVICE — Z DISCONTINUED APPLICATOR SURG PREP 0.35OZ 2% CHG 70% ISO ALC W/ HI LT

## (undated) DEVICE — 3M™ TEGADERM™ TRANSPARENT FILM DRESSING FRAME STYLE, 1624W, 2-3/8 IN X 2-3/4 IN (6 CM X 7 CM), 100/CT 4CT/CASE: Brand: 3M™ TEGADERM™

## (undated) DEVICE — SOLUTION IV 1000ML LAC RINGERS PH 6.5 INJ USP VIAFLX PLAS

## (undated) DEVICE — CATHETER IV 20GA L1.25IN PNK FEP SFTY STR HUB RADPQ DISP

## (undated) DEVICE — TOWEL,OR,DSP,ST,BLUE,STD,4/PK,20PK/CS: Brand: MEDLINE

## (undated) DEVICE — SET ADMIN PRIMING 7ML L30IN 7.35LB 20 GTT 2ND RLER CLMP

## (undated) DEVICE — SET GRAV VENT NVENT CK VLV 3 NDL FREE PRT 10 GTT